# Patient Record
Sex: FEMALE | Race: BLACK OR AFRICAN AMERICAN | Employment: OTHER | URBAN - METROPOLITAN AREA
[De-identification: names, ages, dates, MRNs, and addresses within clinical notes are randomized per-mention and may not be internally consistent; named-entity substitution may affect disease eponyms.]

---

## 2017-03-17 RX ORDER — LORATADINE 10 MG/1
TABLET ORAL
Qty: 30 TABLET | Refills: 3 | Status: SHIPPED | OUTPATIENT
Start: 2017-03-17 | End: 2017-07-11

## 2017-05-05 ENCOUNTER — LAB ENCOUNTER (OUTPATIENT)
Dept: LAB | Age: 67
End: 2017-05-05
Attending: INTERNAL MEDICINE
Payer: MEDICARE

## 2017-05-05 DIAGNOSIS — E78.2 MIXED HYPERLIPIDEMIA: ICD-10-CM

## 2017-05-05 DIAGNOSIS — E11.9 DIABETES MELLITUS (HCC): Primary | ICD-10-CM

## 2017-05-05 PROCEDURE — 82570 ASSAY OF URINE CREATININE: CPT

## 2017-05-05 PROCEDURE — 36415 COLL VENOUS BLD VENIPUNCTURE: CPT

## 2017-05-05 PROCEDURE — 80053 COMPREHEN METABOLIC PANEL: CPT

## 2017-05-05 PROCEDURE — 82043 UR ALBUMIN QUANTITATIVE: CPT

## 2017-05-05 PROCEDURE — 83036 HEMOGLOBIN GLYCOSYLATED A1C: CPT

## 2017-05-05 PROCEDURE — 80061 LIPID PANEL: CPT

## 2017-07-10 NOTE — TELEPHONE ENCOUNTER
Pharm tried faxing request for Loratadine states has failed attempts. Calling in request. Please advise.  Thank you       Current Outpatient Prescriptions:  LORATADINE 10 MG Oral Tab TAKE 1 TABLET(10 MG) BY MOUTH EVERY DAY Disp: 30 tablet Rfl: 3

## 2017-07-11 RX ORDER — LORATADINE 10 MG/1
TABLET ORAL
Qty: 30 TABLET | Refills: 3 | Status: SHIPPED | OUTPATIENT
Start: 2017-07-11 | End: 2017-11-07

## 2017-09-08 ENCOUNTER — LAB ENCOUNTER (OUTPATIENT)
Dept: LAB | Age: 67
End: 2017-09-08
Attending: INTERNAL MEDICINE
Payer: MEDICARE

## 2017-09-08 DIAGNOSIS — E11.9 DIABETES MELLITUS (HCC): Primary | ICD-10-CM

## 2017-09-08 DIAGNOSIS — E78.2 MIXED HYPERLIPIDEMIA: ICD-10-CM

## 2017-09-08 LAB
ALBUMIN SERPL BCP-MCNC: 3.7 G/DL (ref 3.5–4.8)
ALBUMIN/GLOB SERPL: 1.2 {RATIO} (ref 1–2)
ALP SERPL-CCNC: 54 U/L (ref 32–100)
ALT SERPL-CCNC: 18 U/L (ref 14–54)
ANION GAP SERPL CALC-SCNC: 6 MMOL/L (ref 0–18)
AST SERPL-CCNC: 21 U/L (ref 15–41)
BILIRUB SERPL-MCNC: 0.5 MG/DL (ref 0.3–1.2)
BUN SERPL-MCNC: 19 MG/DL (ref 8–20)
BUN/CREAT SERPL: 17.9 (ref 10–20)
CALCIUM SERPL-MCNC: 9.1 MG/DL (ref 8.5–10.5)
CHLORIDE SERPL-SCNC: 106 MMOL/L (ref 95–110)
CHOLEST SERPL-MCNC: 141 MG/DL (ref 110–200)
CO2 SERPL-SCNC: 25 MMOL/L (ref 22–32)
CREAT SERPL-MCNC: 1.06 MG/DL (ref 0.5–1.5)
GLOBULIN PLAS-MCNC: 3.1 G/DL (ref 2.5–3.7)
GLUCOSE SERPL-MCNC: 86 MG/DL (ref 70–99)
HBA1C MFR BLD: 6.5 % (ref 4–6)
HDLC SERPL-MCNC: 51 MG/DL
LDLC SERPL CALC-MCNC: 78 MG/DL (ref 0–99)
NONHDLC SERPL-MCNC: 90 MG/DL
OSMOLALITY UR CALC.SUM OF ELEC: 286 MOSM/KG (ref 275–295)
POTASSIUM SERPL-SCNC: 3.4 MMOL/L (ref 3.3–5.1)
PROT SERPL-MCNC: 6.8 G/DL (ref 5.9–8.4)
SODIUM SERPL-SCNC: 137 MMOL/L (ref 136–144)
TRIGL SERPL-MCNC: 59 MG/DL (ref 1–149)

## 2017-09-08 PROCEDURE — 80061 LIPID PANEL: CPT

## 2017-09-08 PROCEDURE — 83036 HEMOGLOBIN GLYCOSYLATED A1C: CPT

## 2017-09-08 PROCEDURE — 80053 COMPREHEN METABOLIC PANEL: CPT

## 2017-09-08 PROCEDURE — 36415 COLL VENOUS BLD VENIPUNCTURE: CPT

## 2017-11-07 RX ORDER — LORATADINE 10 MG/1
TABLET ORAL
Qty: 30 TABLET | Refills: 3 | Status: SHIPPED | OUTPATIENT
Start: 2017-11-07 | End: 2018-03-09

## 2017-11-10 RX ORDER — LORATADINE 10 MG/1
TABLET ORAL
Qty: 30 TABLET | Refills: 0 | Status: SHIPPED | OUTPATIENT
Start: 2017-11-10 | End: 2019-08-27

## 2018-03-09 ENCOUNTER — LAB ENCOUNTER (OUTPATIENT)
Dept: LAB | Age: 68
End: 2018-03-09
Attending: INTERNAL MEDICINE
Payer: MEDICARE

## 2018-03-09 DIAGNOSIS — E11.9 DIABETES MELLITUS (HCC): Primary | ICD-10-CM

## 2018-03-09 DIAGNOSIS — E78.2 MIXED HYPERLIPIDEMIA: ICD-10-CM

## 2018-03-09 LAB
ALBUMIN SERPL BCP-MCNC: 3.8 G/DL (ref 3.5–4.8)
ALBUMIN/GLOB SERPL: 1.3 {RATIO} (ref 1–2)
ALP SERPL-CCNC: 58 U/L (ref 32–100)
ALT SERPL-CCNC: 15 U/L (ref 14–54)
ANION GAP SERPL CALC-SCNC: 6 MMOL/L (ref 0–18)
AST SERPL-CCNC: 19 U/L (ref 15–41)
BILIRUB SERPL-MCNC: 0.4 MG/DL (ref 0.3–1.2)
BUN SERPL-MCNC: 22 MG/DL (ref 8–20)
BUN/CREAT SERPL: 28.2 (ref 10–20)
CALCIUM SERPL-MCNC: 9.4 MG/DL (ref 8.5–10.5)
CHLORIDE SERPL-SCNC: 109 MMOL/L (ref 95–110)
CHOLEST SERPL-MCNC: 142 MG/DL (ref 110–200)
CO2 SERPL-SCNC: 26 MMOL/L (ref 22–32)
CREAT SERPL-MCNC: 0.78 MG/DL (ref 0.5–1.5)
CREAT UR-MCNC: 104.8 MG/DL
GLOBULIN PLAS-MCNC: 2.9 G/DL (ref 2.5–3.7)
GLUCOSE SERPL-MCNC: 95 MG/DL (ref 70–99)
HBA1C MFR BLD: 7.8 % (ref 4–6)
HDLC SERPL-MCNC: 50 MG/DL
LDLC SERPL CALC-MCNC: 79 MG/DL (ref 0–99)
MICROALBUMIN UR-MCNC: 1.2 MG/DL (ref 0–1.8)
MICROALBUMIN/CREAT UR: 11.5 MG/G{CREAT} (ref 0–20)
NONHDLC SERPL-MCNC: 92 MG/DL
OSMOLALITY UR CALC.SUM OF ELEC: 295 MOSM/KG (ref 275–295)
PATIENT FASTING: YES
POTASSIUM SERPL-SCNC: 4 MMOL/L (ref 3.3–5.1)
PROT SERPL-MCNC: 6.7 G/DL (ref 5.9–8.4)
SODIUM SERPL-SCNC: 141 MMOL/L (ref 136–144)
TRIGL SERPL-MCNC: 65 MG/DL (ref 1–149)

## 2018-03-09 PROCEDURE — 36415 COLL VENOUS BLD VENIPUNCTURE: CPT

## 2018-03-09 PROCEDURE — 82043 UR ALBUMIN QUANTITATIVE: CPT

## 2018-03-09 PROCEDURE — 82570 ASSAY OF URINE CREATININE: CPT

## 2018-03-09 PROCEDURE — 80061 LIPID PANEL: CPT

## 2018-03-09 PROCEDURE — 80053 COMPREHEN METABOLIC PANEL: CPT

## 2018-03-09 PROCEDURE — 83036 HEMOGLOBIN GLYCOSYLATED A1C: CPT

## 2018-03-14 RX ORDER — LORATADINE 10 MG/1
TABLET ORAL
Qty: 30 TABLET | Refills: 3 | Status: SHIPPED | OUTPATIENT
Start: 2018-03-14 | End: 2018-07-02

## 2018-07-05 RX ORDER — LORATADINE 10 MG/1
TABLET ORAL
Qty: 30 TABLET | Refills: 0 | Status: SHIPPED | OUTPATIENT
Start: 2018-07-05 | End: 2018-08-16

## 2018-08-07 NOTE — TELEPHONE ENCOUNTER
Pt last office visit on 11/15/16 for ringing in ear bilateral and acute sinusitis. Pt requesting a refill.  Please advise

## 2018-08-08 RX ORDER — LORATADINE 10 MG/1
TABLET ORAL
Qty: 30 TABLET | Refills: 3 | OUTPATIENT
Start: 2018-08-08

## 2018-08-16 ENCOUNTER — OFFICE VISIT (OUTPATIENT)
Dept: AUDIOLOGY | Facility: CLINIC | Age: 68
End: 2018-08-16
Payer: MEDICARE

## 2018-08-16 ENCOUNTER — OFFICE VISIT (OUTPATIENT)
Dept: OTOLARYNGOLOGY | Facility: CLINIC | Age: 68
End: 2018-08-16
Payer: MEDICARE

## 2018-08-16 VITALS
TEMPERATURE: 98 F | BODY MASS INDEX: 28.61 KG/M2 | DIASTOLIC BLOOD PRESSURE: 73 MMHG | SYSTOLIC BLOOD PRESSURE: 132 MMHG | HEIGHT: 66 IN | WEIGHT: 178 LBS

## 2018-08-16 DIAGNOSIS — J01.90 ACUTE SINUSITIS, RECURRENCE NOT SPECIFIED, UNSPECIFIED LOCATION: ICD-10-CM

## 2018-08-16 DIAGNOSIS — H93.13 TINNITUS OF BOTH EARS: Primary | ICD-10-CM

## 2018-08-16 DIAGNOSIS — H93.19 TINNITUS, UNSPECIFIED LATERALITY: Primary | ICD-10-CM

## 2018-08-16 PROCEDURE — 92567 TYMPANOMETRY: CPT | Performed by: AUDIOLOGIST

## 2018-08-16 PROCEDURE — 99214 OFFICE O/P EST MOD 30 MIN: CPT | Performed by: OTOLARYNGOLOGY

## 2018-08-16 PROCEDURE — G0463 HOSPITAL OUTPT CLINIC VISIT: HCPCS | Performed by: OTOLARYNGOLOGY

## 2018-08-16 PROCEDURE — 92557 COMPREHENSIVE HEARING TEST: CPT | Performed by: AUDIOLOGIST

## 2018-08-16 RX ORDER — LORATADINE 10 MG/1
10 TABLET ORAL DAILY
Qty: 90 TABLET | Refills: 3 | Status: SHIPPED | OUTPATIENT
Start: 2018-08-16 | End: 2020-08-25

## 2018-08-16 NOTE — PROGRESS NOTES
AUDIOGRAM     Swapna Chawla was referred for testing by Demarcus Mi for bilateral tinnitus. 9/22/1950  [id number]        Otoscopic inspection: right ear no cerumen; left ear no cerumen.        Tests/Procedures  Patient was tested via  standard inser

## 2018-08-16 NOTE — PROGRESS NOTES
Fabby Schmidt is a 69year old female. Patient presents with:   Follow - Up: regarding ringing in the ears and sinusitis, improvement in symptoms       HISTORY OF PRESENT ILLNESS    I have seen her in the past for halitosis which appears to be secondary History:  No date: KNEE REPLACEMENT SURGERY Left      REVIEW OF SYSTEMS    System Neg/Pos Details   Constitutional Negative Fatigue, fever and weight loss. ENMT Negative Drooling. Eyes Negative Blurred vision and vision changes.    Respiratory Negative Septum -Normal  Turbinates - Right: Normal, Left: Normal.       Current Outpatient Prescriptions:   •  loratadine 10 MG Oral Tab, Take 1 tablet (10 mg total) by mouth daily. , Disp: 90 tablet, Rfl: 3  •  LORATADINE 10 MG Oral Tab, TAKE 1 TABLET BY MOUTH ZAINAB

## 2018-09-12 ENCOUNTER — LAB ENCOUNTER (OUTPATIENT)
Dept: LAB | Age: 68
End: 2018-09-12
Attending: INTERNAL MEDICINE
Payer: MEDICARE

## 2018-09-12 DIAGNOSIS — E11.9 DIABETES MELLITUS (HCC): Primary | ICD-10-CM

## 2018-09-12 DIAGNOSIS — E78.2 MIXED HYPERLIPIDEMIA: ICD-10-CM

## 2018-09-12 LAB
ALBUMIN SERPL BCP-MCNC: 3.6 G/DL (ref 3.5–4.8)
ALBUMIN/GLOB SERPL: 1.1 {RATIO} (ref 1–2)
ALP SERPL-CCNC: 57 U/L (ref 32–100)
ALT SERPL-CCNC: 17 U/L (ref 14–54)
ANION GAP SERPL CALC-SCNC: 5 MMOL/L (ref 0–18)
AST SERPL-CCNC: 18 U/L (ref 15–41)
BILIRUB SERPL-MCNC: 0.4 MG/DL (ref 0.3–1.2)
BUN SERPL-MCNC: 21 MG/DL (ref 8–20)
BUN/CREAT SERPL: 21 (ref 10–20)
CALCIUM SERPL-MCNC: 9.3 MG/DL (ref 8.5–10.5)
CHLORIDE SERPL-SCNC: 111 MMOL/L (ref 95–110)
CHOLEST SERPL-MCNC: 136 MG/DL (ref 110–200)
CO2 SERPL-SCNC: 24 MMOL/L (ref 22–32)
CREAT SERPL-MCNC: 1 MG/DL (ref 0.5–1.5)
GLOBULIN PLAS-MCNC: 3.4 G/DL (ref 2.5–3.7)
GLUCOSE SERPL-MCNC: 59 MG/DL (ref 70–99)
HBA1C MFR BLD: 6.4 % (ref 4–6)
HDLC SERPL-MCNC: 47 MG/DL
LDLC SERPL CALC-MCNC: 78 MG/DL (ref 0–99)
NONHDLC SERPL-MCNC: 89 MG/DL
OSMOLALITY UR CALC.SUM OF ELEC: 291 MOSM/KG (ref 275–295)
PATIENT FASTING: YES
POTASSIUM SERPL-SCNC: 3.8 MMOL/L (ref 3.3–5.1)
PROT SERPL-MCNC: 7 G/DL (ref 5.9–8.4)
SODIUM SERPL-SCNC: 140 MMOL/L (ref 136–144)
TRIGL SERPL-MCNC: 54 MG/DL (ref 1–149)

## 2018-09-12 PROCEDURE — 80061 LIPID PANEL: CPT

## 2018-09-12 PROCEDURE — 83036 HEMOGLOBIN GLYCOSYLATED A1C: CPT

## 2018-09-12 PROCEDURE — 80053 COMPREHEN METABOLIC PANEL: CPT

## 2018-09-12 PROCEDURE — 36415 COLL VENOUS BLD VENIPUNCTURE: CPT

## 2019-03-12 ENCOUNTER — LAB ENCOUNTER (OUTPATIENT)
Dept: LAB | Age: 69
End: 2019-03-12
Attending: INTERNAL MEDICINE
Payer: MEDICARE

## 2019-03-12 DIAGNOSIS — E78.5 HYPERLIPIDEMIA: ICD-10-CM

## 2019-03-12 DIAGNOSIS — E11.9 DIABETES MELLITUS, TYPE II (HCC): Primary | ICD-10-CM

## 2019-03-12 LAB
ALBUMIN SERPL-MCNC: 3.8 G/DL (ref 3.4–5)
ALBUMIN/GLOB SERPL: 1 {RATIO} (ref 1–2)
ALP LIVER SERPL-CCNC: 99 U/L (ref 55–142)
ALT SERPL-CCNC: 17 U/L (ref 13–56)
ANION GAP SERPL CALC-SCNC: 6 MMOL/L (ref 0–18)
AST SERPL-CCNC: 14 U/L (ref 15–37)
BILIRUB SERPL-MCNC: 0.4 MG/DL (ref 0.1–2)
BUN BLD-MCNC: 23 MG/DL (ref 7–18)
BUN/CREAT SERPL: 24.5 (ref 10–20)
CALCIUM BLD-MCNC: 9.2 MG/DL (ref 8.5–10.1)
CHLORIDE SERPL-SCNC: 112 MMOL/L (ref 98–107)
CHOLEST SMN-MCNC: 149 MG/DL (ref ?–200)
CO2 SERPL-SCNC: 26 MMOL/L (ref 21–32)
CREAT BLD-MCNC: 0.94 MG/DL (ref 0.55–1.02)
CREAT UR-SCNC: 139 MG/DL
EST. AVERAGE GLUCOSE BLD GHB EST-MCNC: 151 MG/DL (ref 68–126)
GLOBULIN PLAS-MCNC: 3.9 G/DL (ref 2.8–4.4)
GLUCOSE BLD-MCNC: 67 MG/DL (ref 70–99)
HBA1C MFR BLD HPLC: 6.9 % (ref ?–5.7)
HDLC SERPL-MCNC: 65 MG/DL (ref 40–59)
LDLC SERPL CALC-MCNC: 71 MG/DL (ref ?–100)
M PROTEIN MFR SERPL ELPH: 7.7 G/DL (ref 6.4–8.2)
MICROALBUMIN UR-MCNC: 1.87 MG/DL
MICROALBUMIN/CREAT 24H UR-RTO: 13.5 UG/MG (ref ?–30)
NONHDLC SERPL-MCNC: 84 MG/DL (ref ?–130)
OSMOLALITY SERPL CALC.SUM OF ELEC: 300 MOSM/KG (ref 275–295)
POTASSIUM SERPL-SCNC: 4.2 MMOL/L (ref 3.5–5.1)
SODIUM SERPL-SCNC: 144 MMOL/L (ref 136–145)
TRIGL SERPL-MCNC: 67 MG/DL (ref 30–149)
VLDLC SERPL CALC-MCNC: 13 MG/DL (ref 0–30)

## 2019-03-12 PROCEDURE — 36415 COLL VENOUS BLD VENIPUNCTURE: CPT

## 2019-03-12 PROCEDURE — 82043 UR ALBUMIN QUANTITATIVE: CPT

## 2019-03-12 PROCEDURE — 83036 HEMOGLOBIN GLYCOSYLATED A1C: CPT

## 2019-03-12 PROCEDURE — 80053 COMPREHEN METABOLIC PANEL: CPT

## 2019-03-12 PROCEDURE — 82570 ASSAY OF URINE CREATININE: CPT

## 2019-03-12 PROCEDURE — 80061 LIPID PANEL: CPT

## 2019-08-19 RX ORDER — LORATADINE 10 MG/1
TABLET ORAL
Qty: 90 TABLET | Refills: 0 | OUTPATIENT
Start: 2019-08-19

## 2019-08-27 ENCOUNTER — OFFICE VISIT (OUTPATIENT)
Dept: OTOLARYNGOLOGY | Facility: CLINIC | Age: 69
End: 2019-08-27
Payer: MEDICARE

## 2019-08-27 VITALS
DIASTOLIC BLOOD PRESSURE: 67 MMHG | BODY MASS INDEX: 29 KG/M2 | SYSTOLIC BLOOD PRESSURE: 106 MMHG | HEIGHT: 66 IN | TEMPERATURE: 99 F

## 2019-08-27 DIAGNOSIS — J01.90 ACUTE SINUSITIS, RECURRENCE NOT SPECIFIED, UNSPECIFIED LOCATION: Primary | ICD-10-CM

## 2019-08-27 PROCEDURE — 99214 OFFICE O/P EST MOD 30 MIN: CPT | Performed by: OTOLARYNGOLOGY

## 2019-08-27 PROCEDURE — G0463 HOSPITAL OUTPT CLINIC VISIT: HCPCS | Performed by: OTOLARYNGOLOGY

## 2019-08-27 RX ORDER — FLUTICASONE PROPIONATE 50 MCG
1 SPRAY, SUSPENSION (ML) NASAL 2 TIMES DAILY
Qty: 1 BOTTLE | Refills: 3 | Status: SHIPPED | OUTPATIENT
Start: 2019-08-27 | End: 2020-08-25

## 2019-08-27 RX ORDER — AMOXICILLIN AND CLAVULANATE POTASSIUM 875; 125 MG/1; MG/1
1 TABLET, FILM COATED ORAL EVERY 12 HOURS
Qty: 20 TABLET | Refills: 0 | Status: SHIPPED | OUTPATIENT
Start: 2019-08-27 | End: 2020-08-25

## 2019-08-27 RX ORDER — MONTELUKAST SODIUM 10 MG/1
10 TABLET ORAL NIGHTLY
Qty: 30 TABLET | Refills: 3 | Status: SHIPPED | OUTPATIENT
Start: 2019-08-27 | End: 2020-08-25

## 2019-08-27 RX ORDER — LORATADINE 10 MG/1
10 TABLET ORAL DAILY
Qty: 30 TABLET | Refills: 3 | Status: SHIPPED | OUTPATIENT
Start: 2019-08-27 | End: 2020-01-04

## 2019-08-27 NOTE — PROGRESS NOTES
Misha Mansfield is a 77year old female.   Patient presents with:  Throat Problem: pt noticed a foul odor in throat and mouth for about 2 weeks       HISTORY OF PRESENT ILLNESS    I have seen her in the past for halitosis which appears to be secondary to po coffee      Family History   Problem Relation Age of Onset   • Breast Cancer Mother        Past Medical History:   Diagnosis Date   • Arthritis    • Chronic sinusitis 2013    halitosis   • Diabetes ([facility] [address]    • Hyperlipidemia        Past Surgical History: Submandibular. Anterior cervical. Posterior cervical. Supraclavicular.         Nose/Mouth/Throat Normal External nose - Normal. Lips/teeth/gums - Normal. Tonsils - Normal. Oropharynx - Normal.   Nose/Mouth/Throat Normal Nares - Right: Normal Left: Normal. S specified, unspecified location  I suspect another mild sinus infection. Start Augmentin from 10 days Singulair Claritin and fluticasone and return to see me in 1 year for reevaluation. Colby Smith.  Blossom Olmstead MD    8/27/2019    12:18 PM

## 2019-08-30 ENCOUNTER — TELEPHONE (OUTPATIENT)
Dept: OTOLARYNGOLOGY | Facility: CLINIC | Age: 69
End: 2019-08-30

## 2019-08-30 RX ORDER — AZITHROMYCIN 250 MG/1
TABLET, FILM COATED ORAL
Qty: 1 PACKAGE | Refills: 0 | Status: SHIPPED | OUTPATIENT
Start: 2019-08-30 | End: 2020-08-25

## 2019-08-30 NOTE — TELEPHONE ENCOUNTER
Pt states she is experiencing constipation after taking antibiotic medication and is requesting to speak with RN to advise.

## 2019-08-30 NOTE — TELEPHONE ENCOUNTER
, pt was seen in office on 08/27/19 for acute sinusitis. Pt using Flonase, Claritin, pt is not using montelukast and Augmentin. Per pt is states since antibiotic, pt started with constipation.  Per pt states stopped antibiotic this morning and and

## 2019-09-17 ENCOUNTER — LAB ENCOUNTER (OUTPATIENT)
Dept: LAB | Age: 69
End: 2019-09-17
Attending: INTERNAL MEDICINE
Payer: MEDICARE

## 2019-09-17 DIAGNOSIS — E11.9 DIABETES MELLITUS, TYPE II (HCC): Primary | ICD-10-CM

## 2019-09-17 DIAGNOSIS — E78.2 MIXED HYPERLIPIDEMIA: ICD-10-CM

## 2019-09-17 LAB
ALBUMIN SERPL-MCNC: 3.5 G/DL (ref 3.4–5)
ALBUMIN/GLOB SERPL: 0.9 {RATIO} (ref 1–2)
ALP LIVER SERPL-CCNC: 80 U/L (ref 55–142)
ALT SERPL-CCNC: 21 U/L (ref 13–56)
ANION GAP SERPL CALC-SCNC: 7 MMOL/L (ref 0–18)
AST SERPL-CCNC: 15 U/L (ref 15–37)
BILIRUB SERPL-MCNC: 0.3 MG/DL (ref 0.1–2)
BUN BLD-MCNC: 22 MG/DL (ref 7–18)
BUN/CREAT SERPL: 21 (ref 10–20)
CALCIUM BLD-MCNC: 9.1 MG/DL (ref 8.5–10.1)
CHLORIDE SERPL-SCNC: 111 MMOL/L (ref 98–112)
CHOLEST SMN-MCNC: 141 MG/DL (ref ?–200)
CO2 SERPL-SCNC: 25 MMOL/L (ref 21–32)
CREAT BLD-MCNC: 1.05 MG/DL (ref 0.55–1.02)
EST. AVERAGE GLUCOSE BLD GHB EST-MCNC: 229 MG/DL (ref 68–126)
GLOBULIN PLAS-MCNC: 3.7 G/DL (ref 2.8–4.4)
GLUCOSE BLD-MCNC: 164 MG/DL (ref 70–99)
HBA1C MFR BLD HPLC: 9.6 % (ref ?–5.7)
HDLC SERPL-MCNC: 48 MG/DL (ref 40–59)
LDLC SERPL CALC-MCNC: 73 MG/DL (ref ?–100)
M PROTEIN MFR SERPL ELPH: 7.2 G/DL (ref 6.4–8.2)
NONHDLC SERPL-MCNC: 93 MG/DL (ref ?–130)
OSMOLALITY SERPL CALC.SUM OF ELEC: 303 MOSM/KG (ref 275–295)
PATIENT FASTING: YES
PATIENT FASTING: YES
POTASSIUM SERPL-SCNC: 4.4 MMOL/L (ref 3.5–5.1)
SODIUM SERPL-SCNC: 143 MMOL/L (ref 136–145)
TRIGL SERPL-MCNC: 98 MG/DL (ref 30–149)
VLDLC SERPL CALC-MCNC: 20 MG/DL (ref 0–30)

## 2019-09-17 PROCEDURE — 36415 COLL VENOUS BLD VENIPUNCTURE: CPT

## 2019-09-17 PROCEDURE — 80061 LIPID PANEL: CPT

## 2019-09-17 PROCEDURE — 80053 COMPREHEN METABOLIC PANEL: CPT

## 2019-09-17 PROCEDURE — 83036 HEMOGLOBIN GLYCOSYLATED A1C: CPT

## 2020-01-04 RX ORDER — LORATADINE 10 MG/1
TABLET ORAL
Qty: 30 TABLET | Refills: 3 | Status: SHIPPED | OUTPATIENT
Start: 2020-01-04 | End: 2020-05-07

## 2020-05-07 RX ORDER — LORATADINE 10 MG/1
TABLET ORAL
Qty: 30 TABLET | Refills: 3 | Status: SHIPPED | OUTPATIENT
Start: 2020-05-07 | End: 2020-08-25

## 2020-06-08 ENCOUNTER — TELEPHONE (OUTPATIENT)
Dept: ADMINISTRATIVE | Facility: HOSPITAL | Age: 70
End: 2020-06-08

## 2020-06-08 NOTE — TELEPHONE ENCOUNTER
Patient states that she spoke to her PCP about getting tested for COVID. She states that she felt a little poorly for a few days from about 6/03/20-6/05/20. All vague fatigue with all Neg ROS, otherwise. Type 2 DM, does not check BS.   Has not seen PCP i

## 2020-06-10 ENCOUNTER — HOSPITAL ENCOUNTER (EMERGENCY)
Facility: HOSPITAL | Age: 70
Discharge: HOME | End: 2020-06-10
Attending: EMERGENCY MEDICINE
Payer: MEDICARE

## 2020-06-10 ENCOUNTER — APPOINTMENT (OUTPATIENT)
Dept: CT IMAGING | Facility: HOSPITAL | Age: 70
End: 2020-06-10
Attending: EMERGENCY MEDICINE
Payer: MEDICARE

## 2020-06-10 ENCOUNTER — APPOINTMENT (OUTPATIENT)
Dept: GENERAL RADIOLOGY | Facility: HOSPITAL | Age: 70
End: 2020-06-10
Attending: EMERGENCY MEDICINE
Payer: MEDICARE

## 2020-06-10 VITALS
DIASTOLIC BLOOD PRESSURE: 82 MMHG | TEMPERATURE: 97 F | RESPIRATION RATE: 18 BRPM | OXYGEN SATURATION: 99 % | SYSTOLIC BLOOD PRESSURE: 152 MMHG | HEART RATE: 74 BPM

## 2020-06-10 DIAGNOSIS — R42 VERTIGO: Primary | ICD-10-CM

## 2020-06-10 PROCEDURE — 80048 BASIC METABOLIC PNL TOTAL CA: CPT | Performed by: EMERGENCY MEDICINE

## 2020-06-10 PROCEDURE — 81001 URINALYSIS AUTO W/SCOPE: CPT | Performed by: EMERGENCY MEDICINE

## 2020-06-10 PROCEDURE — 84484 ASSAY OF TROPONIN QUANT: CPT | Performed by: EMERGENCY MEDICINE

## 2020-06-10 PROCEDURE — 99284 EMERGENCY DEPT VISIT MOD MDM: CPT

## 2020-06-10 PROCEDURE — 70450 CT HEAD/BRAIN W/O DYE: CPT | Performed by: EMERGENCY MEDICINE

## 2020-06-10 PROCEDURE — 82962 GLUCOSE BLOOD TEST: CPT

## 2020-06-10 PROCEDURE — 85025 COMPLETE CBC W/AUTO DIFF WBC: CPT | Performed by: EMERGENCY MEDICINE

## 2020-06-10 PROCEDURE — 71045 X-RAY EXAM CHEST 1 VIEW: CPT | Performed by: EMERGENCY MEDICINE

## 2020-06-10 PROCEDURE — 93005 ELECTROCARDIOGRAM TRACING: CPT

## 2020-06-10 PROCEDURE — 36415 COLL VENOUS BLD VENIPUNCTURE: CPT

## 2020-06-10 PROCEDURE — 82009 KETONE BODYS QUAL: CPT | Performed by: EMERGENCY MEDICINE

## 2020-06-10 PROCEDURE — 93010 ELECTROCARDIOGRAM REPORT: CPT | Performed by: EMERGENCY MEDICINE

## 2020-06-10 RX ORDER — MECLIZINE HYDROCHLORIDE 25 MG/1
25 TABLET ORAL 3 TIMES DAILY PRN
Qty: 20 TABLET | Refills: 0 | Status: ON HOLD | OUTPATIENT
Start: 2020-06-10 | End: 2021-08-17

## 2020-06-10 NOTE — ED NOTES
PT reports even with anxiolytic medications she will not be able to tolerate MRI.  Dr. Parada Learn aware

## 2020-06-10 NOTE — ED PROVIDER NOTES
Patient Seen in: [facility] AND [facility] Emergency Department      History   Patient presents with:  Dizziness    Stated Complaint: dizziness, \"high sugars\" suspected per pt     HPI    Patient presents the emergency department complaining of dizziness spells 99%         Physical Exam  Vitals signs and nursing note reviewed. Constitutional:       General: She is not in acute distress. Appearance: She is well-developed. HENT:      Head: Normocephalic.       Mouth/Throat:      Mouth: Mucous membranes are m components within normal limits   POCT GLUCOSE - Abnormal; Notable for the following components:    POC Glucose  257 (*)     All other components within normal limits   CBC W/ DIFFERENTIAL - Abnormal; Notable for the following components:    RBC 3.65 (*) performed to rule out the possibility of a small stroke causing her disequilibrium although with the short-lived 1 minute episodes that occur episodically, this is more likely peripheral vertigo symptomatology but I did explain to the patient that there wa

## 2020-06-10 NOTE — ED INITIAL ASSESSMENT (HPI)
Intermittent dizziness since last week. Pt reports compliance with oral DM meds but has not been checking her sugars. Denies cough/illness.

## 2020-08-11 ENCOUNTER — OFFICE VISIT (OUTPATIENT)
Dept: OTOLARYNGOLOGY | Facility: CLINIC | Age: 70
End: 2020-08-11
Payer: MEDICARE

## 2020-08-11 VITALS
DIASTOLIC BLOOD PRESSURE: 70 MMHG | BODY MASS INDEX: 29 KG/M2 | HEIGHT: 66 IN | SYSTOLIC BLOOD PRESSURE: 116 MMHG | HEART RATE: 77 BPM

## 2020-08-11 DIAGNOSIS — J30.9 ALLERGIC RHINITIS, UNSPECIFIED SEASONALITY, UNSPECIFIED TRIGGER: Primary | ICD-10-CM

## 2020-08-11 DIAGNOSIS — H92.11 OTORRHEA OF RIGHT EAR: ICD-10-CM

## 2020-08-11 PROCEDURE — 92504 EAR MICROSCOPY EXAMINATION: CPT | Performed by: OTOLARYNGOLOGY

## 2020-08-11 PROCEDURE — 99213 OFFICE O/P EST LOW 20 MIN: CPT | Performed by: OTOLARYNGOLOGY

## 2020-08-11 PROCEDURE — G0463 HOSPITAL OUTPT CLINIC VISIT: HCPCS | Performed by: OTOLARYNGOLOGY

## 2020-08-11 RX ORDER — LORATADINE 10 MG/1
10 TABLET ORAL DAILY
Qty: 90 TABLET | Refills: 3 | Status: SHIPPED | OUTPATIENT
Start: 2020-08-11 | End: 2020-08-25

## 2020-08-11 RX ORDER — SITAGLIPTIN 100 MG/1
100 TABLET, FILM COATED ORAL DAILY
COMMUNITY
Start: 2020-07-30

## 2020-08-11 NOTE — PROGRESS NOTES
Merced Rizzo is a 68year old female.   Patient presents with:  Ear Problem: per pt continous drainage from right ear, clear drainage no odor, per pt has been has been cortipsorin ear drops for about 3 weeks , no improvment , pt also c/o itching       HI of education: Not on file      Highest education level: Not on file    Tobacco Use      Smoking status: Current Every Day Smoker        Years: 10.00      Smokeless tobacco: Never Used    Substance and Sexual Activity      Alcohol use: No        Alcohol/wee Normal. Skull - Normal.        Nasopharynx Normal External nose - Normal. Lips/teeth/gums - Normal. Tonsils - Normal. Oropharynx - Normal.   Ears Normal Inspection - Right: Normal, Left: Normal. Canal - Right: debris in canal and on drum.   Left: Normal. TM (Patient not taking: Reported on 8/11/2020 ), Disp: 20 tablet, Rfl: 0  •  azithromycin (ZITHROMAX Z-CHRIS) 250 MG Oral Tab, Take 1 by oral route every day for 5 days. 2 tablets today.  (Patient not taking: Reported on 8/11/2020 ), Disp: 1 Package, Rfl: 0  •

## 2020-08-25 ENCOUNTER — OFFICE VISIT (OUTPATIENT)
Dept: ORTHOPEDICS CLINIC | Facility: CLINIC | Age: 70
End: 2020-08-25
Payer: MEDICARE

## 2020-08-25 ENCOUNTER — HOSPITAL ENCOUNTER (OUTPATIENT)
Dept: GENERAL RADIOLOGY | Facility: HOSPITAL | Age: 70
Discharge: HOME | End: 2020-08-25
Attending: ORTHOPAEDIC SURGERY
Payer: MEDICARE

## 2020-08-25 VITALS
HEIGHT: 66 IN | BODY MASS INDEX: 29 KG/M2 | RESPIRATION RATE: 16 BRPM | DIASTOLIC BLOOD PRESSURE: 76 MMHG | SYSTOLIC BLOOD PRESSURE: 134 MMHG

## 2020-08-25 DIAGNOSIS — M25.562 PAIN IN BOTH KNEES, UNSPECIFIED CHRONICITY: ICD-10-CM

## 2020-08-25 DIAGNOSIS — M25.562 PAIN IN BOTH KNEES, UNSPECIFIED CHRONICITY: Primary | ICD-10-CM

## 2020-08-25 DIAGNOSIS — M25.561 PAIN IN BOTH KNEES, UNSPECIFIED CHRONICITY: Primary | ICD-10-CM

## 2020-08-25 DIAGNOSIS — T84.84XA PAIN DUE TO TOTAL LEFT KNEE REPLACEMENT, INITIAL ENCOUNTER (HCC): ICD-10-CM

## 2020-08-25 DIAGNOSIS — Z96.652 PAIN DUE TO TOTAL LEFT KNEE REPLACEMENT, INITIAL ENCOUNTER (HCC): ICD-10-CM

## 2020-08-25 DIAGNOSIS — M17.11 PRIMARY OSTEOARTHRITIS OF RIGHT KNEE: ICD-10-CM

## 2020-08-25 DIAGNOSIS — M25.561 PAIN IN BOTH KNEES, UNSPECIFIED CHRONICITY: ICD-10-CM

## 2020-08-25 PROCEDURE — 73565 X-RAY EXAM OF KNEES: CPT | Performed by: ORTHOPAEDIC SURGERY

## 2020-08-25 PROCEDURE — 20610 DRAIN/INJ JOINT/BURSA W/O US: CPT | Performed by: ORTHOPAEDIC SURGERY

## 2020-08-25 PROCEDURE — 99204 OFFICE O/P NEW MOD 45 MIN: CPT | Performed by: ORTHOPAEDIC SURGERY

## 2020-08-25 RX ORDER — TRIAMCINOLONE ACETONIDE 40 MG/ML
40 INJECTION, SUSPENSION INTRA-ARTICULAR; INTRAMUSCULAR ONCE
Status: COMPLETED | OUTPATIENT
Start: 2020-08-25 | End: 2020-08-25

## 2020-08-25 RX ADMIN — TRIAMCINOLONE ACETONIDE 40 MG: 40 INJECTION, SUSPENSION INTRA-ARTICULAR; INTRAMUSCULAR at 12:05:00

## 2020-08-25 NOTE — H&P
NURSING INTAKE COMMENTS: Patient presents with:  Consult: bilateral knee pain -- Right knee pain is 5/10 and left knee is 5/10. Denies any recent injuries. Pain comes and goes especially at night. Hx of left TKA 2011/2012. No recent xrays taken.   Left lowe Allergies  Family History   Problem Relation Age of Onset   • Breast Cancer Mother      No family Hx of DVT/PE    Social History    Occupational History      Not on file    Tobacco Use      Smoking status: Current Every Day Smoker        Years: 10.00 symptoms DVT. Neurological: See normal throughout bilateral lower extremities. Imaging: Right knee has straightforward bone-on-bone medial osteoarthritis. The left knee replacement stems proximally distally.   The femoral component as well as patella a 8/25/2020 at 12:46 PM             Lab Results   Component Value Date    WBC 9.0 06/10/2020    HGB 10.9 (L) 06/10/2020    .0 06/10/2020      Lab Results   Component Value Date     (H) 06/10/2020    BUN 24 (H) 06/10/2020    CREATSERUM 1.11 (H) her being diabetic she will have to follow-up in 1 week. At follow-up we will do an aspiration to evaluate the joint fluid and possibly sent out for analysis to evaluate for infection. And possibly proceed forth a cortisone injection of the left knee.

## 2020-09-08 ENCOUNTER — OFFICE VISIT (OUTPATIENT)
Dept: ORTHOPEDICS CLINIC | Facility: CLINIC | Age: 70
End: 2020-09-08
Payer: MEDICARE

## 2020-09-08 DIAGNOSIS — T84.84XA PAIN DUE TO TOTAL LEFT KNEE REPLACEMENT, INITIAL ENCOUNTER (HCC): Primary | ICD-10-CM

## 2020-09-08 DIAGNOSIS — M17.11 PRIMARY OSTEOARTHRITIS OF RIGHT KNEE: ICD-10-CM

## 2020-09-08 DIAGNOSIS — Z96.652 PAIN DUE TO TOTAL LEFT KNEE REPLACEMENT, INITIAL ENCOUNTER (HCC): Primary | ICD-10-CM

## 2020-09-08 PROCEDURE — G0463 HOSPITAL OUTPT CLINIC VISIT: HCPCS | Performed by: ORTHOPAEDIC SURGERY

## 2020-09-08 PROCEDURE — 20610 DRAIN/INJ JOINT/BURSA W/O US: CPT | Performed by: ORTHOPAEDIC SURGERY

## 2020-09-08 PROCEDURE — 99213 OFFICE O/P EST LOW 20 MIN: CPT | Performed by: ORTHOPAEDIC SURGERY

## 2020-09-08 RX ORDER — TRIAMCINOLONE ACETONIDE 40 MG/ML
40 INJECTION, SUSPENSION INTRA-ARTICULAR; INTRAMUSCULAR ONCE
Status: COMPLETED | OUTPATIENT
Start: 2020-09-08 | End: 2020-09-08

## 2020-09-08 RX ADMIN — TRIAMCINOLONE ACETONIDE 40 MG: 40 INJECTION, SUSPENSION INTRA-ARTICULAR; INTRAMUSCULAR at 18:08:00

## 2020-09-08 NOTE — PROGRESS NOTES
NURSING INTAKE COMMENTS: Patient presents with: Follow - Up: left knee pain ,here for an injection      HPI: This 68year old female presents today injection to the left knee replacement.   Incidentally her right knee was injected at the last visit and is worrisome skin lesions  EYES:denies blurred vision or double vision  HEENT: denies new nasal congestion, sinus pain or ST  LUNGS: denies shortness of breath  CARDIOVASCULAR: denies chest pain  GI: no hematemesis, no worsening heartburn, no diarrhea  : no in the right knee including joint space narrowing and osteophyte formation most significant in the medial compartment. Status post revision left knee total arthroplasty with long stem femoral and tibial components.   Periprosthetic lucency is seen surround week to let us know the results the injection. Again the right knee was doing very well from the injection 2 weeks ago. Follow Up: Return if symptoms worsen or fail to improve.     Alcira Serna MD

## 2020-09-15 ENCOUNTER — LAB ENCOUNTER (OUTPATIENT)
Dept: LAB | Age: 70
End: 2020-09-15
Attending: INTERNAL MEDICINE
Payer: MEDICARE

## 2020-09-15 DIAGNOSIS — E78.5 HYPERLIPIDEMIA: ICD-10-CM

## 2020-09-15 DIAGNOSIS — E11.9 DM TYPE 2 (DIABETES MELLITUS, TYPE 2) (HCC): Primary | ICD-10-CM

## 2020-09-15 LAB
ALBUMIN SERPL-MCNC: 3.4 G/DL (ref 3.4–5)
ALBUMIN/GLOB SERPL: 0.9 {RATIO} (ref 1–2)
ALP LIVER SERPL-CCNC: 78 U/L (ref 55–142)
ALT SERPL-CCNC: 23 U/L (ref 13–56)
ANION GAP SERPL CALC-SCNC: 8 MMOL/L (ref 0–18)
AST SERPL-CCNC: 9 U/L (ref 15–37)
BILIRUB SERPL-MCNC: 0.5 MG/DL (ref 0.1–2)
BUN BLD-MCNC: 27 MG/DL (ref 7–18)
BUN/CREAT SERPL: 21.6 (ref 10–20)
CALCIUM BLD-MCNC: 9.5 MG/DL (ref 8.5–10.1)
CHLORIDE SERPL-SCNC: 108 MMOL/L (ref 98–112)
CHOLEST SMN-MCNC: 166 MG/DL (ref ?–200)
CO2 SERPL-SCNC: 24 MMOL/L (ref 21–32)
CREAT BLD-MCNC: 1.25 MG/DL (ref 0.55–1.02)
CREAT UR-SCNC: 194 MG/DL
EST. AVERAGE GLUCOSE BLD GHB EST-MCNC: 246 MG/DL (ref 68–126)
GLOBULIN PLAS-MCNC: 3.8 G/DL (ref 2.8–4.4)
GLUCOSE BLD-MCNC: 156 MG/DL (ref 70–99)
HBA1C MFR BLD HPLC: 10.2 % (ref ?–5.7)
HDLC SERPL-MCNC: 63 MG/DL (ref 40–59)
LDLC SERPL CALC-MCNC: 87 MG/DL (ref ?–100)
M PROTEIN MFR SERPL ELPH: 7.2 G/DL (ref 6.4–8.2)
MICROALBUMIN UR-MCNC: 4.87 MG/DL
MICROALBUMIN/CREAT 24H UR-RTO: 25.1 UG/MG (ref ?–30)
NONHDLC SERPL-MCNC: 103 MG/DL (ref ?–130)
OSMOLALITY SERPL CALC.SUM OF ELEC: 298 MOSM/KG (ref 275–295)
PATIENT FASTING Y/N/NP: YES
PATIENT FASTING Y/N/NP: YES
POTASSIUM SERPL-SCNC: 4.2 MMOL/L (ref 3.5–5.1)
SODIUM SERPL-SCNC: 140 MMOL/L (ref 136–145)
TRIGL SERPL-MCNC: 82 MG/DL (ref 30–149)
VLDLC SERPL CALC-MCNC: 16 MG/DL (ref 0–30)

## 2020-09-15 PROCEDURE — 36415 COLL VENOUS BLD VENIPUNCTURE: CPT

## 2020-09-15 PROCEDURE — 82570 ASSAY OF URINE CREATININE: CPT

## 2020-09-15 PROCEDURE — 80061 LIPID PANEL: CPT

## 2020-09-15 PROCEDURE — 82043 UR ALBUMIN QUANTITATIVE: CPT

## 2020-09-15 PROCEDURE — 80053 COMPREHEN METABOLIC PANEL: CPT

## 2020-09-15 PROCEDURE — 83036 HEMOGLOBIN GLYCOSYLATED A1C: CPT

## 2021-02-03 DIAGNOSIS — Z23 NEED FOR VACCINATION: ICD-10-CM

## 2021-05-25 ENCOUNTER — LAB ENCOUNTER (OUTPATIENT)
Dept: LAB | Age: 71
End: 2021-05-25
Attending: INTERNAL MEDICINE
Payer: MEDICARE

## 2021-05-25 DIAGNOSIS — E11.9 DIABETES MELLITUS (HCC): Primary | ICD-10-CM

## 2021-05-25 DIAGNOSIS — E78.2 MIXED HYPERLIPIDEMIA: ICD-10-CM

## 2021-05-25 PROCEDURE — 36415 COLL VENOUS BLD VENIPUNCTURE: CPT

## 2021-05-25 PROCEDURE — 82570 ASSAY OF URINE CREATININE: CPT

## 2021-05-25 PROCEDURE — 80061 LIPID PANEL: CPT

## 2021-05-25 PROCEDURE — 83036 HEMOGLOBIN GLYCOSYLATED A1C: CPT

## 2021-05-25 PROCEDURE — 80053 COMPREHEN METABOLIC PANEL: CPT

## 2021-05-25 PROCEDURE — 82043 UR ALBUMIN QUANTITATIVE: CPT

## 2021-08-16 ENCOUNTER — HOSPITAL ENCOUNTER (INPATIENT)
Facility: HOSPITAL | Age: 71
LOS: 1 days | Discharge: HOME | DRG: 390 | End: 2021-08-18
Attending: EMERGENCY MEDICINE | Admitting: HOSPITALIST
Payer: MEDICARE

## 2021-08-16 DIAGNOSIS — K56.609 SMALL BOWEL OBSTRUCTION (HCC): Primary | ICD-10-CM

## 2021-08-17 ENCOUNTER — APPOINTMENT (OUTPATIENT)
Dept: GENERAL RADIOLOGY | Facility: HOSPITAL | Age: 71
DRG: 390 | End: 2021-08-17
Attending: EMERGENCY MEDICINE
Payer: MEDICARE

## 2021-08-17 ENCOUNTER — APPOINTMENT (OUTPATIENT)
Dept: CT IMAGING | Facility: HOSPITAL | Age: 71
DRG: 390 | End: 2021-08-17
Attending: EMERGENCY MEDICINE
Payer: MEDICARE

## 2021-08-17 ENCOUNTER — APPOINTMENT (OUTPATIENT)
Dept: GENERAL RADIOLOGY | Facility: HOSPITAL | Age: 71
DRG: 390 | End: 2021-08-17
Attending: SURGERY
Payer: MEDICARE

## 2021-08-17 PROBLEM — K56.609 SMALL BOWEL OBSTRUCTION (HCC): Status: ACTIVE | Noted: 2021-08-17

## 2021-08-17 LAB
ANION GAP SERPL CALC-SCNC: 7 MMOL/L (ref 0–18)
BASOPHILS # BLD AUTO: 0.03 X10(3) UL (ref 0–0.2)
BASOPHILS NFR BLD AUTO: 0.2 %
BILIRUB UR QL: NEGATIVE
BUN BLD-MCNC: 17 MG/DL (ref 7–18)
BUN/CREAT SERPL: 17.2 (ref 10–20)
CALCIUM BLD-MCNC: 9.1 MG/DL (ref 8.5–10.1)
CHLORIDE SERPL-SCNC: 103 MMOL/L (ref 98–112)
CLARITY UR: CLEAR
CO2 SERPL-SCNC: 26 MMOL/L (ref 21–32)
COLOR UR: YELLOW
CREAT BLD-MCNC: 0.99 MG/DL
DEPRECATED RDW RBC AUTO: 42.7 FL (ref 35.1–46.3)
EOSINOPHIL # BLD AUTO: 0.2 X10(3) UL (ref 0–0.7)
EOSINOPHIL NFR BLD AUTO: 1.5 %
ERYTHROCYTE [DISTWIDTH] IN BLOOD BY AUTOMATED COUNT: 12.7 % (ref 11–15)
GLUCOSE BLD-MCNC: 252 MG/DL (ref 70–99)
GLUCOSE BLDC GLUCOMTR-MCNC: 216 MG/DL (ref 70–99)
GLUCOSE BLDC GLUCOMTR-MCNC: 245 MG/DL (ref 70–99)
GLUCOSE BLDC GLUCOMTR-MCNC: 248 MG/DL (ref 70–99)
GLUCOSE BLDC GLUCOMTR-MCNC: 259 MG/DL (ref 70–99)
GLUCOSE UR-MCNC: >=500 MG/DL
HAV IGM SER QL: 2.3 MG/DL (ref 1.6–2.6)
HCT VFR BLD AUTO: 40.7 %
HGB BLD-MCNC: 13.6 G/DL
HGB UR QL STRIP.AUTO: NEGATIVE
IMM GRANULOCYTES # BLD AUTO: 0.05 X10(3) UL (ref 0–1)
IMM GRANULOCYTES NFR BLD: 0.4 %
KETONES UR-MCNC: NEGATIVE MG/DL
LACTATE SERPL-SCNC: 2 MMOL/L (ref 0.4–2)
LEUKOCYTE ESTERASE UR QL STRIP.AUTO: NEGATIVE
LYMPHOCYTES # BLD AUTO: 1.8 X10(3) UL (ref 1–4)
LYMPHOCYTES NFR BLD AUTO: 13.9 %
MCH RBC QN AUTO: 30.8 PG (ref 26–34)
MCHC RBC AUTO-ENTMCNC: 33.4 G/DL (ref 31–37)
MCV RBC AUTO: 92.1 FL
MONOCYTES # BLD AUTO: 0.68 X10(3) UL (ref 0.1–1)
MONOCYTES NFR BLD AUTO: 5.2 %
NEUTROPHILS # BLD AUTO: 10.22 X10 (3) UL (ref 1.5–7.7)
NEUTROPHILS # BLD AUTO: 10.22 X10(3) UL (ref 1.5–7.7)
NEUTROPHILS NFR BLD AUTO: 78.8 %
NITRITE UR QL STRIP.AUTO: NEGATIVE
OSMOLALITY SERPL CALC.SUM OF ELEC: 292 MOSM/KG (ref 275–295)
PH UR: 5 [PH] (ref 5–8)
PLATELET # BLD AUTO: 198 10(3)UL (ref 150–450)
POTASSIUM SERPL-SCNC: 4.2 MMOL/L (ref 3.5–5.1)
PROT UR-MCNC: NEGATIVE MG/DL
RBC # BLD AUTO: 4.42 X10(6)UL
SARS-COV-2 RNA RESP QL NAA+PROBE: NOT DETECTED
SODIUM SERPL-SCNC: 136 MMOL/L (ref 136–145)
SP GR UR STRIP: 1.02 (ref 1–1.03)
UROBILINOGEN UR STRIP-ACNC: <2
WBC # BLD AUTO: 13 X10(3) UL (ref 4–11)

## 2021-08-17 PROCEDURE — 74250 X-RAY XM SM INT 1CNTRST STD: CPT | Performed by: SURGERY

## 2021-08-17 PROCEDURE — 74177 CT ABD & PELVIS W/CONTRAST: CPT | Performed by: EMERGENCY MEDICINE

## 2021-08-17 PROCEDURE — 71045 X-RAY EXAM CHEST 1 VIEW: CPT | Performed by: EMERGENCY MEDICINE

## 2021-08-17 RX ORDER — ONDANSETRON 2 MG/ML
4 INJECTION INTRAMUSCULAR; INTRAVENOUS EVERY 6 HOURS PRN
Status: DISCONTINUED | OUTPATIENT
Start: 2021-08-17 | End: 2021-08-18

## 2021-08-17 RX ORDER — DEXTROSE MONOHYDRATE 25 G/50ML
50 INJECTION, SOLUTION INTRAVENOUS
Status: DISCONTINUED | OUTPATIENT
Start: 2021-08-17 | End: 2021-08-18

## 2021-08-17 RX ORDER — SODIUM CHLORIDE 9 MG/ML
INJECTION, SOLUTION INTRAVENOUS CONTINUOUS
Status: DISCONTINUED | OUTPATIENT
Start: 2021-08-17 | End: 2021-08-18

## 2021-08-17 RX ORDER — HEPARIN SODIUM 5000 [USP'U]/ML
5000 INJECTION, SOLUTION INTRAVENOUS; SUBCUTANEOUS EVERY 12 HOURS SCHEDULED
Status: DISCONTINUED | OUTPATIENT
Start: 2021-08-17 | End: 2021-08-18

## 2021-08-17 RX ORDER — FAMOTIDINE 10 MG/ML
20 INJECTION, SOLUTION INTRAVENOUS ONCE
Status: COMPLETED | OUTPATIENT
Start: 2021-08-17 | End: 2021-08-17

## 2021-08-17 RX ORDER — MORPHINE SULFATE 2 MG/ML
1 INJECTION, SOLUTION INTRAMUSCULAR; INTRAVENOUS EVERY 4 HOURS PRN
Status: DISCONTINUED | OUTPATIENT
Start: 2021-08-17 | End: 2021-08-18

## 2021-08-17 NOTE — PROGRESS NOTES
Pt seen again in afternoon  Pt states minimal pain  Having diarrhea  Wants to eat    abd soft not tender    Reviewed images of SBFT  Still transition but no high grade obstruction    I discussed with pt findings  I explained possible failure of conservativ

## 2021-08-17 NOTE — PROGRESS NOTES
ADMISSION NOTE    65year old female with h/o uterine cancer s/p RT and THBSO, SBO which responded to NG suction, Diabetes presents with abdominal pain N and emesis. Found to have high grade distal SBO. [place] Available medical records partially reviewed.  Dict

## 2021-08-17 NOTE — PLAN OF CARE
NG tube this morning, no output, NG discontinued, IV fluids, small bowel x-ray this morning, multiple episodes of diarrhea today, patient feeling better, started on full liquid diet,     Problem: Patient Centered Care  Goal: Patient preferences are identif Evaluate effectiveness of GI medications  - Encourage mobilization and activity  - Obtain nutritional consult as needed  - Establish a toileting routine/schedule  - Consider collaborating with pharmacy to review patient's medication profile  Outcome: Not P

## 2021-08-17 NOTE — CM/SW NOTE
BPCI Bundled Advanced Medicare Program    Plan of care reviewed for care coordination and discharge planning.  Noted pt falls under  BPCI/Medicare program, with DRG Gastrointestinal Obstruction ([address], [address])      NSOC BARRIE Proposal:  Home    /case m

## 2021-08-17 NOTE — ED PROVIDER NOTES
Patient Seen in: [facility] AND [facility] Emergency Department      History   Patient presents with:  Abdomen/Flank Pain    Stated Complaint: GERD    HPI/Subjective:   HPI    49-year-old female with history of hysterectomy, diabetes, hyperlipidemia, here with c above.    Physical Exam     ED Triage Vitals [08/16/21 7461]   /80   Pulse 78   Resp 18   Temp 97 °F (36.1 °C)   Temp src Temporal   SpO2 97 %   O2 Device None (Room air)       Current:/80   Pulse 78   Temp 97 °F (36.1 °C) (Temporal)   Resp 18 - 5.30 x10(6)uL    HGB 13.6 12.0 - 16.0 g/dL    HCT 40.7 35.0 - 48.0 %    MCV 92.1 80.0 - 100.0 fL    MCH 30.8 26.0 - 34.0 pg    MCHC 33.4 31.0 - 37.0 g/dL    RDW-SD 42.7 35.1 - 46.3 fL    RDW 12.7 11.0 - 15.0 %    .0 150.0 - 450.0 10(3)uL    Neutro Mesenteric vasculature patent. No free air, portal venous gas, perforation or pneumatosis. Recommend surgical consult. The proximal small bowel and stomach are relatively decompressed. Appendix surgically absent.     Few small lymph nodes adjacent to based on the presenting problem including SBO, UTI, gastritis, GERD.                        Disposition and Plan     Clinical Impression:  Small bowel obstruction ([facility] [address]  (primary encounter diagnosis)     Disposition:  Admit  8/17/2021  4:40 am    Follow-up:

## 2021-08-17 NOTE — CONSULTS
[facility] HOSP - [facility]    Report of Consultation    Red Ruthadore Patient Status:  Inpatient    1950 MRN [id number]   Location [facility] 3W/SW Attending Magda Harrington MD   Hosp Day # 0 PCP Mary Rodas MD     Date of Admiss %.    General: Alert, orientated x3. Cooperative. No apparent distress. Minimal discomfort  HEENT: Exam is unremarkable. Without scleral icterus. Neck: No tenderness to palpitation. No JVD. Lungs: Clear to auscultation bilaterally.   Cardiac: Regu findings  Plan SBFT this AM      Fozia Schulte MD  8/17/2021  7:32 AM

## 2021-08-17 NOTE — ED QUICK NOTES
Orders for admission, patient is aware of plan and ready to go upstairs.  Any questions, please call ED RAQUEL jensen  at extension [phone]   Type of COVID test sent:rapid  COVID Suspicion level: Low    Titratable drug(s) infusing:none  Rate:    LOC at time of trans

## 2021-08-17 NOTE — ED INITIAL ASSESSMENT (HPI)
Pt presents to ED for generalized abdominal pain. Pt describes the pain as sharp. Pt states she has a hx of GERD. +Nausea.

## 2021-08-17 NOTE — H&P
[facility]    PATIENT'S NAME: Marcus Jones   ATTENDING PHYSICIAN: Main Chow MD   PATIENT ACCOUNT#:   [id number]    LOCATION:  [address] [place] [address] #:   [id number]       YOB: 1950  ADMISSION DATE:       08/16/2021 was normal, but there was no pneumatosis, thumb printing, or portal venous gas seen. There was some mild mesenteric edema and mild free peritoneal fluid, likely reactive.   The emergency room physician spoke with Dr. Lars Tobar and an NG tube was placed to low 12-point review of systems except as listed in the History of Present Illness. PHYSICAL EXAMINATION:  VITAL SIGNS:  Temperature was 97, pulse 82, respiratory rate 18, blood pressure 144/70, O2 saturation 97% on room air.   GENERAL:  She was somewhat anxi Dr. Brian Alford as needed. The patient's current clinical status and proposed treatment plan was discussed with her. All of her questions were answered and she agreed with the plan of care as outlined above.      Dictated By Arnel Fernandes MD  d: 54

## 2021-08-18 VITALS
SYSTOLIC BLOOD PRESSURE: 148 MMHG | HEART RATE: 69 BPM | HEIGHT: 66 IN | DIASTOLIC BLOOD PRESSURE: 62 MMHG | TEMPERATURE: 99 F | OXYGEN SATURATION: 100 % | BODY MASS INDEX: 30.13 KG/M2 | RESPIRATION RATE: 16 BRPM | WEIGHT: 187.5 LBS

## 2021-08-18 LAB
ALBUMIN SERPL-MCNC: 2.9 G/DL (ref 3.4–5)
ALBUMIN/GLOB SERPL: 0.8 {RATIO} (ref 1–2)
ALP LIVER SERPL-CCNC: 77 U/L
ALT SERPL-CCNC: 17 U/L
ANION GAP SERPL CALC-SCNC: 5 MMOL/L (ref 0–18)
AST SERPL-CCNC: 17 U/L (ref 15–37)
BASOPHILS # BLD AUTO: 0.03 X10(3) UL (ref 0–0.2)
BASOPHILS NFR BLD AUTO: 0.4 %
BILIRUB SERPL-MCNC: 0.5 MG/DL (ref 0.1–2)
BUN BLD-MCNC: 9 MG/DL (ref 7–18)
BUN/CREAT SERPL: 10.5 (ref 10–20)
C DIFF TOX B STL QL: NEGATIVE
CALCIUM BLD-MCNC: 8.5 MG/DL (ref 8.5–10.1)
CHLORIDE SERPL-SCNC: 106 MMOL/L (ref 98–112)
CO2 SERPL-SCNC: 25 MMOL/L (ref 21–32)
CREAT BLD-MCNC: 0.86 MG/DL
DEPRECATED RDW RBC AUTO: 43 FL (ref 35.1–46.3)
EOSINOPHIL # BLD AUTO: 0.16 X10(3) UL (ref 0–0.7)
EOSINOPHIL NFR BLD AUTO: 2.3 %
ERYTHROCYTE [DISTWIDTH] IN BLOOD BY AUTOMATED COUNT: 12.4 % (ref 11–15)
GLOBULIN PLAS-MCNC: 3.8 G/DL (ref 2.8–4.4)
GLUCOSE BLD-MCNC: 314 MG/DL (ref 70–99)
GLUCOSE BLDC GLUCOMTR-MCNC: 247 MG/DL (ref 70–99)
GLUCOSE BLDC GLUCOMTR-MCNC: 286 MG/DL (ref 70–99)
GLUCOSE BLDC GLUCOMTR-MCNC: 333 MG/DL (ref 70–99)
HCT VFR BLD AUTO: 42.3 %
HGB BLD-MCNC: 13.6 G/DL
IMM GRANULOCYTES # BLD AUTO: 0.01 X10(3) UL (ref 0–1)
IMM GRANULOCYTES NFR BLD: 0.1 %
LYMPHOCYTES # BLD AUTO: 2.37 X10(3) UL (ref 1–4)
LYMPHOCYTES NFR BLD AUTO: 34.4 %
M PROTEIN MFR SERPL ELPH: 6.7 G/DL (ref 6.4–8.2)
MCH RBC QN AUTO: 30.3 PG (ref 26–34)
MCHC RBC AUTO-ENTMCNC: 32.2 G/DL (ref 31–37)
MCV RBC AUTO: 94.2 FL
MONOCYTES # BLD AUTO: 0.58 X10(3) UL (ref 0.1–1)
MONOCYTES NFR BLD AUTO: 8.4 %
NEUTROPHILS # BLD AUTO: 3.73 X10 (3) UL (ref 1.5–7.7)
NEUTROPHILS # BLD AUTO: 3.73 X10(3) UL (ref 1.5–7.7)
NEUTROPHILS NFR BLD AUTO: 54.4 %
OSMOLALITY SERPL CALC.SUM OF ELEC: 293 MOSM/KG (ref 275–295)
PLATELET # BLD AUTO: 180 10(3)UL (ref 150–450)
POTASSIUM SERPL-SCNC: 3.9 MMOL/L (ref 3.5–5.1)
RBC # BLD AUTO: 4.49 X10(6)UL
SODIUM SERPL-SCNC: 136 MMOL/L (ref 136–145)
WBC # BLD AUTO: 6.9 X10(3) UL (ref 4–11)

## 2021-08-18 PROCEDURE — 99239 HOSP IP/OBS DSCHRG MGMT >30: CPT | Performed by: HOSPITALIST

## 2021-08-18 RX ORDER — ALPRAZOLAM 0.25 MG/1
0.25 TABLET ORAL 4 TIMES DAILY PRN
Status: DISCONTINUED | OUTPATIENT
Start: 2021-08-18 | End: 2021-08-18

## 2021-08-18 RX ORDER — LOSARTAN POTASSIUM 50 MG/1
50 TABLET ORAL DAILY
Status: DISCONTINUED | OUTPATIENT
Start: 2021-08-18 | End: 2021-08-18

## 2021-08-18 RX ORDER — PREGABALIN 75 MG/1
300 CAPSULE ORAL NIGHTLY
Status: DISCONTINUED | OUTPATIENT
Start: 2021-08-18 | End: 2021-08-18

## 2021-08-18 RX ORDER — ATORVASTATIN CALCIUM 10 MG/1
10 TABLET, FILM COATED ORAL NIGHTLY
Refills: 3 | Status: DISCONTINUED | OUTPATIENT
Start: 2021-08-18 | End: 2021-08-18

## 2021-08-18 NOTE — PLAN OF CARE
No nausea, abdominal pain, or diarrhea today, tolerating soft/low fiber diet, plan to discharge home this evening    Problem: Patient Centered Care  Goal: Patient preferences are identified and integrated in the patient's plan of care  Description: Sharri Carreno medications  - Encourage mobilization and activity  - Obtain nutritional consult as needed  - Establish a toileting routine/schedule  - Consider collaborating with pharmacy to review patient's medication profile  Outcome: Adequate for Discharge     Problem

## 2021-08-18 NOTE — PLAN OF CARE
Problem: Patient Centered Care  Goal: Patient preferences are identified and integrated in the patient's plan of care  Description: Interventions:  - What would you like us to know as we care for you?  I live with my daughter  - Provide timely, complete, to review patient's medication profile  Outcome: Progressing     Problem: PAIN - ADULT  Goal: Verbalizes/displays adequate comfort level or patient's stated pain goal  Description: INTERVENTIONS:  - Encourage pt to monitor pain and request assistance  - As

## 2021-08-18 NOTE — PROGRESS NOTES
[facility]    Progress Note    Swapna Clackamas Patient Status:  Inpatient    1950 MRN [id number]   Location [facility] 3W/SW Attending iGancarlo Mayorga MD   Hosp Day # 1 PCP Meng Valle MD     Subjective:     Pt with inc • losartan Potassium  50 mg Oral Daily   • pregabalin  300 mg Oral Nightly   • atorvastatin  10 mg Oral Nightly   • Heparin Sodium (Porcine)  5,000 Units Subcutaneous 2 times per day   • pantoprazole (PROTONIX) IV push  40 mg Intravenous Q24H   • Insul abrupt transition point in the right lower quadrant.   While underlying adhesion formation is a possibility, the ileum demonstrates alternating short segments of mild bowel wall thickening and intervening normal small bowel loops extending distal to the tra

## 2021-08-18 NOTE — DISCHARGE SUMMARY
[facility]    Discharge Summary    Luz Jackson Patient Status:  Inpatient    1950 MRN [id number]   Location [facility] 3W/SW Attending Wilbert Merritt MD   Hosp Day # 1 PCP Negrita Heard MD     Date of Admission:  states that at about 2 p.m. on Monday, she developed generalized abdominal pain. She tried to throw up, thinking that she would feel better, brought up some clear mucus, but no improvement in her symptoms.   She had a normal bowel movement at [facility]. CONTINUE taking these medications      Instructions Prescription details   ALPRAZolam 0.25 MG Tabs  Commonly known as: XANAX      Take 0.25 mg by mouth 4 (four) times daily as needed.    Refills: 5     glipiZIDE 5 MG Tabs  Commonly known as: Donna Nam

## 2021-08-18 NOTE — CM/SW NOTE
08/18/21 1312   Discharge disposition   Expected discharge disposition Home or Self   Post Acute Care Provider Home   Discharge transportation [facility]     Received notice from pt's RN/Leeann Gunderson pt to be [address] later today 8/18 and will need Medi

## 2021-09-08 ENCOUNTER — PATIENT OUTREACH (OUTPATIENT)
Dept: CASE MANAGEMENT | Age: 71
End: 2021-09-08

## 2021-09-08 RX ORDER — LORATADINE 10 MG/1
TABLET ORAL
Qty: 30 TABLET | Refills: 0 | Status: SHIPPED | OUTPATIENT
Start: 2021-09-08 | End: 2021-09-17

## 2021-09-17 ENCOUNTER — OFFICE VISIT (OUTPATIENT)
Dept: OTOLARYNGOLOGY | Facility: CLINIC | Age: 71
End: 2021-09-17
Payer: MEDICARE

## 2021-09-17 VITALS — WEIGHT: 187 LBS | BODY MASS INDEX: 30.05 KG/M2 | HEIGHT: 66 IN

## 2021-09-17 DIAGNOSIS — J30.9 ALLERGIC RHINITIS, UNSPECIFIED SEASONALITY, UNSPECIFIED TRIGGER: Primary | ICD-10-CM

## 2021-09-17 PROCEDURE — 1111F DSCHRG MED/CURRENT MED MERGE: CPT | Performed by: OTOLARYNGOLOGY

## 2021-09-17 PROCEDURE — 99213 OFFICE O/P EST LOW 20 MIN: CPT | Performed by: OTOLARYNGOLOGY

## 2021-09-17 RX ORDER — LORATADINE 10 MG/1
10 TABLET ORAL DAILY
Qty: 90 TABLET | Refills: 3 | Status: SHIPPED | OUTPATIENT
Start: 2021-09-17

## 2021-09-17 NOTE — PROGRESS NOTES
Perry Crawford is a 65year old female. Patient presents with: Follow - Up: pt is here today for her allergy follow up, she is doing great today and has no complaints.        HISTORY OF PRESENT ILLNESS  I have seen her in the past for halitosis which frank she has had complete resolution of all her symptoms and she is very happy with the results of her treatment. No other signs, symptoms or complaints      Social History    Socioeconomic History      Marital status:      Tobacco Use      Smoking statu Right: Normal, Left: Normal. Pupil - Right: Normal, Left: Normal. Fundus - Right: Normal, Left: Normal.   Neurological Normal Memory - Normal. Cranial nerves - Cranial nerves II through XII grossly intact.    Head/Face Normal Facial features - Normal. Beltran Rein these errors have been corrected. While every attempt is made to correct errors during dictation discrepancies may still exist    Fredi Lopez MD    9/17/2021    1:06 PM

## 2021-11-11 ENCOUNTER — LAB ENCOUNTER (OUTPATIENT)
Dept: LAB | Age: 71
End: 2021-11-11
Attending: INTERNAL MEDICINE
Payer: MEDICARE

## 2021-11-11 DIAGNOSIS — E78.5 HYPERLIPIDEMIA: ICD-10-CM

## 2021-11-11 DIAGNOSIS — E55.9 VITAMIN D DEFICIENCY: ICD-10-CM

## 2021-11-11 DIAGNOSIS — E11.9 TYPE 2 DIABETES MELLITUS (HCC): Primary | ICD-10-CM

## 2021-11-11 PROCEDURE — 80053 COMPREHEN METABOLIC PANEL: CPT

## 2021-11-11 PROCEDURE — 80061 LIPID PANEL: CPT

## 2021-11-11 PROCEDURE — 82306 VITAMIN D 25 HYDROXY: CPT

## 2021-11-11 PROCEDURE — 36415 COLL VENOUS BLD VENIPUNCTURE: CPT

## 2021-11-11 PROCEDURE — 83036 HEMOGLOBIN GLYCOSYLATED A1C: CPT

## 2021-12-09 ENCOUNTER — HOSPITAL ENCOUNTER (OUTPATIENT)
Dept: GENERAL RADIOLOGY | Facility: HOSPITAL | Age: 71
Discharge: HOME | End: 2021-12-09
Attending: FAMILY MEDICINE
Payer: MEDICARE

## 2021-12-09 DIAGNOSIS — M25.511 RIGHT SHOULDER PAIN: ICD-10-CM

## 2021-12-09 PROCEDURE — 73030 X-RAY EXAM OF SHOULDER: CPT | Performed by: FAMILY MEDICINE

## 2022-01-17 ENCOUNTER — OFFICE VISIT (OUTPATIENT)
Dept: ORTHOPEDICS CLINIC | Facility: CLINIC | Age: 72
End: 2022-01-17
Payer: MEDICARE

## 2022-01-17 DIAGNOSIS — M75.21 BICEPS TENDINITIS OF RIGHT SHOULDER: ICD-10-CM

## 2022-01-17 DIAGNOSIS — M75.31 CALCIFIC TENDINITIS OF RIGHT SHOULDER: Primary | ICD-10-CM

## 2022-01-17 PROCEDURE — 20610 DRAIN/INJ JOINT/BURSA W/O US: CPT | Performed by: ORTHOPAEDIC SURGERY

## 2022-01-17 PROCEDURE — 99214 OFFICE O/P EST MOD 30 MIN: CPT | Performed by: ORTHOPAEDIC SURGERY

## 2022-01-17 RX ORDER — TRIAMCINOLONE ACETONIDE 40 MG/ML
40 INJECTION, SUSPENSION INTRA-ARTICULAR; INTRAMUSCULAR ONCE
Status: COMPLETED | OUTPATIENT
Start: 2022-01-17 | End: 2022-01-17

## 2022-01-17 NOTE — H&P
NURSING INTAKE COMMENTS: Patient presents with:  Shoulder Pain: Right shoulder pain, patient had xrays taken 12/9/21. Patient states that she has had the pain for several years, she states that the weather had alot to do with it.  Patient can't raise right Years: 10.00      Smokeless tobacco: Never Used    Vaping Use      Vaping Use: Never used    Substance and Sexual Activity      Alcohol use: No        Alcohol/week: 0.0 standard drinks      Drug use: No      Sexual activity: Not on file       Review of Sys Value Date    GLU 82 11/11/2021    BUN 15 11/11/2021    CREATSERUM 1.01 11/11/2021    GFRNAA 55 (L) 11/11/2021    GFRAA 64 11/11/2021        Assessment and Plan:  Diagnoses and all orders for this visit:    Calcific tendinitis of right shoulder  -     ABHAY

## 2022-01-17 NOTE — PROGRESS NOTES
Per verbal order from Dr. Navarro [facility], draw up 5ml of 0.5% Marcaine and 1ml of Kenalog 40 for cortisone injection to the right shoulder

## 2022-01-24 ENCOUNTER — TELEPHONE (OUTPATIENT)
Dept: PHYSICAL THERAPY | Facility: HOSPITAL | Age: 72
End: 2022-01-24

## 2022-01-24 NOTE — TELEPHONE ENCOUNTER
Called patient to offer earlier evaluation and follow-up dates. Confirmed today's date and offered openings for today, Wednesday, and next week. Left call back number.

## 2022-01-31 ENCOUNTER — OFFICE VISIT (OUTPATIENT)
Dept: ORTHOPEDICS CLINIC | Facility: CLINIC | Age: 72
End: 2022-01-31
Payer: MEDICARE

## 2022-01-31 VITALS
WEIGHT: 187 LBS | HEIGHT: 66 IN | HEART RATE: 80 BPM | BODY MASS INDEX: 30.05 KG/M2 | DIASTOLIC BLOOD PRESSURE: 76 MMHG | SYSTOLIC BLOOD PRESSURE: 123 MMHG

## 2022-01-31 DIAGNOSIS — M17.11 PRIMARY OSTEOARTHRITIS OF RIGHT KNEE: Primary | ICD-10-CM

## 2022-01-31 PROCEDURE — 20610 DRAIN/INJ JOINT/BURSA W/O US: CPT | Performed by: PHYSICIAN ASSISTANT

## 2022-01-31 PROCEDURE — 99213 OFFICE O/P EST LOW 20 MIN: CPT | Performed by: PHYSICIAN ASSISTANT

## 2022-01-31 RX ORDER — TRIAMCINOLONE ACETONIDE 40 MG/ML
40 INJECTION, SUSPENSION INTRA-ARTICULAR; INTRAMUSCULAR ONCE
Status: COMPLETED | OUTPATIENT
Start: 2022-01-31 | End: 2022-01-31

## 2022-01-31 RX ADMIN — TRIAMCINOLONE ACETONIDE 40 MG: 40 INJECTION, SUSPENSION INTRA-ARTICULAR; INTRAMUSCULAR at 12:56:00

## 2022-01-31 NOTE — PROGRESS NOTES
Per verbal order from Dr. Navarro Mom, draw up 5ml of 0.5% Marcaine and 1ml of Kenalog 40 for cortisone injection to right knee Liya Perrin CMA  Patient provided education handout for cortisone injection.    Patient left office prior to obtaining post injection v

## 2022-01-31 NOTE — PROGRESS NOTES
NURSING INTAKE COMMENTS: Patient presents with:  Knee Pain: right knee pain, pain off and on,started summer of 2021, pain at a 6/10 today, would like cortisone inj today      HPI: This 66year old female presents today with right knee pain.   We last saw he Never used    Substance and Sexual Activity      Alcohol use: No        Alcohol/week: 0.0 standard drinks      Drug use: No      Sexual activity: Not on file       Review of Systems:  GENERAL: feels generally well, no significant weight loss or weight gain (KENALOG-40) 40 MG/ML injection 40 mg  -     DRAIN/INJECT LARGE JOINT/BURSA        Assessment: Right knee osteoarthritis not interested in knee replacement surgery. Plan: I offered patient cortisone directed onto in the office today.   Risks, benefits, a

## 2022-02-07 ENCOUNTER — TELEPHONE (OUTPATIENT)
Dept: PHYSICAL THERAPY | Facility: HOSPITAL | Age: 72
End: 2022-02-07

## 2022-02-08 ENCOUNTER — OFFICE VISIT (OUTPATIENT)
Dept: PHYSICAL THERAPY | Facility: HOSPITAL | Age: 72
End: 2022-02-08
Attending: ORTHOPAEDIC SURGERY
Payer: MEDICARE

## 2022-02-08 DIAGNOSIS — M75.31 CALCIFIC TENDINITIS OF RIGHT SHOULDER: ICD-10-CM

## 2022-02-08 DIAGNOSIS — M75.21 BICEPS TENDINITIS OF RIGHT SHOULDER: ICD-10-CM

## 2022-02-08 PROCEDURE — 97161 PT EVAL LOW COMPLEX 20 MIN: CPT

## 2022-02-08 PROCEDURE — 97110 THERAPEUTIC EXERCISES: CPT

## 2022-02-10 ENCOUNTER — OFFICE VISIT (OUTPATIENT)
Dept: PHYSICAL THERAPY | Facility: HOSPITAL | Age: 72
End: 2022-02-10
Attending: ORTHOPAEDIC SURGERY
Payer: MEDICARE

## 2022-02-10 PROCEDURE — 97110 THERAPEUTIC EXERCISES: CPT

## 2022-02-10 PROCEDURE — 97140 MANUAL THERAPY 1/> REGIONS: CPT

## 2022-02-15 ENCOUNTER — OFFICE VISIT (OUTPATIENT)
Dept: PHYSICAL THERAPY | Facility: HOSPITAL | Age: 72
End: 2022-02-15
Attending: ORTHOPAEDIC SURGERY
Payer: MEDICARE

## 2022-02-15 PROCEDURE — 97140 MANUAL THERAPY 1/> REGIONS: CPT

## 2022-02-15 PROCEDURE — 97110 THERAPEUTIC EXERCISES: CPT

## 2022-02-17 ENCOUNTER — TELEPHONE (OUTPATIENT)
Dept: PHYSICAL THERAPY | Facility: HOSPITAL | Age: 72
End: 2022-02-17

## 2022-02-17 ENCOUNTER — APPOINTMENT (OUTPATIENT)
Dept: PHYSICAL THERAPY | Facility: HOSPITAL | Age: 72
End: 2022-02-17
Attending: ORTHOPAEDIC SURGERY
Payer: MEDICARE

## 2022-02-22 ENCOUNTER — OFFICE VISIT (OUTPATIENT)
Dept: PHYSICAL THERAPY | Facility: HOSPITAL | Age: 72
End: 2022-02-22
Attending: ORTHOPAEDIC SURGERY
Payer: MEDICARE

## 2022-02-22 PROCEDURE — 97140 MANUAL THERAPY 1/> REGIONS: CPT

## 2022-02-22 PROCEDURE — 97110 THERAPEUTIC EXERCISES: CPT

## 2022-02-24 ENCOUNTER — OFFICE VISIT (OUTPATIENT)
Dept: PHYSICAL THERAPY | Facility: HOSPITAL | Age: 72
End: 2022-02-24
Attending: ORTHOPAEDIC SURGERY
Payer: MEDICARE

## 2022-02-24 PROCEDURE — 97110 THERAPEUTIC EXERCISES: CPT

## 2022-02-28 ENCOUNTER — OFFICE VISIT (OUTPATIENT)
Dept: PHYSICAL THERAPY | Facility: HOSPITAL | Age: 72
End: 2022-02-28
Attending: FAMILY MEDICINE
Payer: MEDICARE

## 2022-02-28 PROCEDURE — 97110 THERAPEUTIC EXERCISES: CPT

## 2022-03-01 ENCOUNTER — APPOINTMENT (OUTPATIENT)
Dept: PHYSICAL THERAPY | Facility: HOSPITAL | Age: 72
End: 2022-03-01
Attending: ORTHOPAEDIC SURGERY
Payer: MEDICARE

## 2022-03-03 ENCOUNTER — APPOINTMENT (OUTPATIENT)
Dept: PHYSICAL THERAPY | Facility: HOSPITAL | Age: 72
End: 2022-03-03
Attending: ORTHOPAEDIC SURGERY
Payer: MEDICARE

## 2022-03-14 ENCOUNTER — TELEPHONE (OUTPATIENT)
Dept: ORTHOPEDICS CLINIC | Facility: CLINIC | Age: 72
End: 2022-03-14

## 2022-03-14 NOTE — TELEPHONE ENCOUNTER
Pt called requesting to speak to the nurse regarding an open sore on the leg. Pt is scheduled for an appointment 3-29-22.   Please call

## 2022-03-14 NOTE — TELEPHONE ENCOUNTER
S/w pt and she states she developed a blister on her left lateral lower leg close to ankle on top of an old scar she had and it popped yesterday and has been seeping clearing water like fluid. She denies any redness, swelling, warmth. No injury. Pt states she has had issues with water in both legs and she has been dieting and that has helped. Pt states Dr. Lexei Pleitez told her before that he thought the knee replacement was loose and to let him know if she was having problems. Pt has appt on 3/29. Advised her Dr. Lexie Pleitez can eval left knee at that time, but any concerns for blister/ water retention in legs she should f/u with her pcp/UC. She verbalized understanding.

## 2022-03-29 ENCOUNTER — OFFICE VISIT (OUTPATIENT)
Dept: ORTHOPEDICS CLINIC | Facility: CLINIC | Age: 72
End: 2022-03-29
Payer: MEDICARE

## 2022-03-29 ENCOUNTER — HOSPITAL ENCOUNTER (OUTPATIENT)
Dept: GENERAL RADIOLOGY | Facility: HOSPITAL | Age: 72
Discharge: HOME | End: 2022-03-29
Attending: ORTHOPAEDIC SURGERY
Payer: MEDICARE

## 2022-03-29 DIAGNOSIS — R52 PAIN: ICD-10-CM

## 2022-03-29 DIAGNOSIS — M79.89 LEFT LEG SWELLING: Primary | ICD-10-CM

## 2022-03-29 PROCEDURE — 73590 X-RAY EXAM OF LOWER LEG: CPT | Performed by: ORTHOPAEDIC SURGERY

## 2022-03-29 PROCEDURE — 99214 OFFICE O/P EST MOD 30 MIN: CPT | Performed by: ORTHOPAEDIC SURGERY

## 2022-03-29 RX ORDER — MELOXICAM 7.5 MG/1
TABLET ORAL
COMMUNITY
Start: 2022-03-05

## 2022-05-19 ENCOUNTER — LAB ENCOUNTER (OUTPATIENT)
Dept: LAB | Age: 72
End: 2022-05-19
Attending: INTERNAL MEDICINE
Payer: MEDICARE

## 2022-05-19 DIAGNOSIS — E11.9 TYPE 2 DIABETES MELLITUS (HCC): Primary | ICD-10-CM

## 2022-05-19 DIAGNOSIS — E78.5 HYPERLIPIDEMIA: ICD-10-CM

## 2022-05-19 LAB
ALBUMIN SERPL-MCNC: 3.5 G/DL (ref 3.4–5)
ALBUMIN/GLOB SERPL: 1 {RATIO} (ref 1–2)
ALP LIVER SERPL-CCNC: 73 U/L
ALT SERPL-CCNC: 21 U/L
ANION GAP SERPL CALC-SCNC: 6 MMOL/L (ref 0–18)
AST SERPL-CCNC: 17 U/L (ref 15–37)
BILIRUB SERPL-MCNC: 0.4 MG/DL (ref 0.1–2)
BUN BLD-MCNC: 15 MG/DL (ref 7–18)
BUN/CREAT SERPL: 15.2 (ref 10–20)
CALCIUM BLD-MCNC: 9.2 MG/DL (ref 8.5–10.1)
CHLORIDE SERPL-SCNC: 112 MMOL/L (ref 98–112)
CHOLEST SERPL-MCNC: 149 MG/DL (ref ?–200)
CO2 SERPL-SCNC: 27 MMOL/L (ref 21–32)
CREAT BLD-MCNC: 0.99 MG/DL
CREAT UR-SCNC: 246 MG/DL
EST. AVERAGE GLUCOSE BLD GHB EST-MCNC: 154 MG/DL (ref 68–126)
FASTING PATIENT LIPID ANSWER: YES
FASTING STATUS PATIENT QL REPORTED: YES
GLOBULIN PLAS-MCNC: 3.5 G/DL (ref 2.8–4.4)
GLUCOSE BLD-MCNC: 81 MG/DL (ref 70–99)
HBA1C MFR BLD: 7 % (ref ?–5.7)
HDLC SERPL-MCNC: 55 MG/DL (ref 40–59)
LDLC SERPL CALC-MCNC: 80 MG/DL (ref ?–100)
MICROALBUMIN UR-MCNC: 4.33 MG/DL
MICROALBUMIN/CREAT 24H UR-RTO: 17.6 UG/MG (ref ?–30)
NONHDLC SERPL-MCNC: 94 MG/DL (ref ?–130)
OSMOLALITY SERPL CALC.SUM OF ELEC: 300 MOSM/KG (ref 275–295)
POTASSIUM SERPL-SCNC: 3.9 MMOL/L (ref 3.5–5.1)
PROT SERPL-MCNC: 7 G/DL (ref 6.4–8.2)
SODIUM SERPL-SCNC: 145 MMOL/L (ref 136–145)
TRIGL SERPL-MCNC: 72 MG/DL (ref 30–149)
VLDLC SERPL CALC-MCNC: 11 MG/DL (ref 0–30)

## 2022-05-19 PROCEDURE — 80061 LIPID PANEL: CPT

## 2022-05-19 PROCEDURE — 83036 HEMOGLOBIN GLYCOSYLATED A1C: CPT

## 2022-05-19 PROCEDURE — 82043 UR ALBUMIN QUANTITATIVE: CPT

## 2022-05-19 PROCEDURE — 82570 ASSAY OF URINE CREATININE: CPT

## 2022-05-19 PROCEDURE — 80053 COMPREHEN METABOLIC PANEL: CPT

## 2022-05-19 PROCEDURE — 36415 COLL VENOUS BLD VENIPUNCTURE: CPT

## 2022-07-07 ENCOUNTER — OFFICE VISIT (OUTPATIENT)
Dept: PHYSICAL THERAPY | Facility: HOSPITAL | Age: 72
End: 2022-07-07
Attending: ORTHOPAEDIC SURGERY
Payer: MEDICARE

## 2022-07-07 DIAGNOSIS — M79.89 LEFT LEG SWELLING: ICD-10-CM

## 2022-07-07 DIAGNOSIS — R52 PAIN: ICD-10-CM

## 2022-07-07 PROCEDURE — 97140 MANUAL THERAPY 1/> REGIONS: CPT | Performed by: PHYSICAL THERAPIST

## 2022-07-07 PROCEDURE — 97163 PT EVAL HIGH COMPLEX 45 MIN: CPT | Performed by: PHYSICAL THERAPIST

## 2022-07-11 ENCOUNTER — TELEPHONE (OUTPATIENT)
Dept: ORTHOPEDICS CLINIC | Facility: CLINIC | Age: 72
End: 2022-07-11

## 2022-07-14 ENCOUNTER — HOSPITAL ENCOUNTER (OUTPATIENT)
Dept: ULTRASOUND IMAGING | Facility: HOSPITAL | Age: 72
Discharge: HOME | End: 2022-07-14
Attending: FAMILY MEDICINE
Payer: MEDICARE

## 2022-07-14 DIAGNOSIS — R10.9 ABDOMINAL PAIN: ICD-10-CM

## 2022-07-14 PROCEDURE — 76700 US EXAM ABDOM COMPLETE: CPT | Performed by: FAMILY MEDICINE

## 2022-07-19 ENCOUNTER — OFFICE VISIT (OUTPATIENT)
Dept: PHYSICAL THERAPY | Facility: HOSPITAL | Age: 72
End: 2022-07-19
Attending: ORTHOPAEDIC SURGERY
Payer: MEDICARE

## 2022-07-19 PROCEDURE — 97140 MANUAL THERAPY 1/> REGIONS: CPT

## 2022-07-19 PROCEDURE — 97110 THERAPEUTIC EXERCISES: CPT

## 2022-07-20 ENCOUNTER — APPOINTMENT (OUTPATIENT)
Dept: PHYSICAL THERAPY | Facility: HOSPITAL | Age: 72
End: 2022-07-20
Attending: ORTHOPAEDIC SURGERY
Payer: MEDICARE

## 2022-07-22 ENCOUNTER — OFFICE VISIT (OUTPATIENT)
Dept: PHYSICAL THERAPY | Facility: HOSPITAL | Age: 72
End: 2022-07-22
Attending: ORTHOPAEDIC SURGERY
Payer: MEDICARE

## 2022-07-22 DIAGNOSIS — I89.0 LYMPHEDEMA: Primary | ICD-10-CM

## 2022-07-22 PROCEDURE — 97140 MANUAL THERAPY 1/> REGIONS: CPT | Performed by: PHYSICAL THERAPIST

## 2022-07-25 ENCOUNTER — OFFICE VISIT (OUTPATIENT)
Dept: PHYSICAL THERAPY | Facility: HOSPITAL | Age: 72
End: 2022-07-25
Attending: ORTHOPAEDIC SURGERY
Payer: MEDICARE

## 2022-07-25 PROCEDURE — 97140 MANUAL THERAPY 1/> REGIONS: CPT | Performed by: PHYSICAL THERAPIST

## 2022-07-26 ENCOUNTER — APPOINTMENT (OUTPATIENT)
Dept: PHYSICAL THERAPY | Facility: HOSPITAL | Age: 72
End: 2022-07-26
Attending: ORTHOPAEDIC SURGERY
Payer: MEDICARE

## 2022-07-26 ENCOUNTER — HOSPITAL ENCOUNTER (OUTPATIENT)
Dept: GENERAL RADIOLOGY | Facility: HOSPITAL | Age: 72
Discharge: HOME | End: 2022-07-26
Attending: ORTHOPAEDIC SURGERY
Payer: MEDICARE

## 2022-07-26 ENCOUNTER — OFFICE VISIT (OUTPATIENT)
Dept: ORTHOPEDICS CLINIC | Facility: CLINIC | Age: 72
End: 2022-07-26
Payer: MEDICARE

## 2022-07-26 VITALS — DIASTOLIC BLOOD PRESSURE: 71 MMHG | SYSTOLIC BLOOD PRESSURE: 152 MMHG | HEART RATE: 79 BPM

## 2022-07-26 DIAGNOSIS — R52 PAIN: ICD-10-CM

## 2022-07-26 DIAGNOSIS — E66.09 CLASS 1 OBESITY DUE TO EXCESS CALORIES WITH SERIOUS COMORBIDITY AND BODY MASS INDEX (BMI) OF 30.0 TO 30.9 IN ADULT: ICD-10-CM

## 2022-07-26 DIAGNOSIS — M17.11 PRIMARY OSTEOARTHRITIS OF RIGHT KNEE: Primary | ICD-10-CM

## 2022-07-26 PROCEDURE — 20610 DRAIN/INJ JOINT/BURSA W/O US: CPT

## 2022-07-26 PROCEDURE — 99213 OFFICE O/P EST LOW 20 MIN: CPT

## 2022-07-26 PROCEDURE — 73564 X-RAY EXAM KNEE 4 OR MORE: CPT | Performed by: ORTHOPAEDIC SURGERY

## 2022-07-26 RX ORDER — TRIAMCINOLONE ACETONIDE 40 MG/ML
40 INJECTION, SUSPENSION INTRA-ARTICULAR; INTRAMUSCULAR ONCE
Status: COMPLETED | OUTPATIENT
Start: 2022-07-26 | End: 2022-07-26

## 2022-07-26 RX ADMIN — TRIAMCINOLONE ACETONIDE 40 MG: 40 INJECTION, SUSPENSION INTRA-ARTICULAR; INTRAMUSCULAR at 14:07:00

## 2022-07-26 NOTE — PROGRESS NOTES
Per verbal order from Dr. Jhon Pineda, draw up 5ml of 0.5% Marcaine and 1ml of Kenalog 40 for cortisone injection to Right knee.  Ashtyn Gallegos

## 2022-07-28 ENCOUNTER — OFFICE VISIT (OUTPATIENT)
Dept: PHYSICAL THERAPY | Facility: HOSPITAL | Age: 72
End: 2022-07-28
Attending: ORTHOPAEDIC SURGERY
Payer: MEDICARE

## 2022-07-28 PROCEDURE — 97140 MANUAL THERAPY 1/> REGIONS: CPT | Performed by: PHYSICAL THERAPIST

## 2022-08-02 ENCOUNTER — APPOINTMENT (OUTPATIENT)
Dept: PHYSICAL THERAPY | Facility: HOSPITAL | Age: 72
End: 2022-08-02
Attending: ORTHOPAEDIC SURGERY
Payer: MEDICARE

## 2022-08-04 ENCOUNTER — APPOINTMENT (OUTPATIENT)
Dept: PHYSICAL THERAPY | Facility: HOSPITAL | Age: 72
End: 2022-08-04
Attending: ORTHOPAEDIC SURGERY
Payer: MEDICARE

## 2022-08-05 ENCOUNTER — OFFICE VISIT (OUTPATIENT)
Dept: PHYSICAL THERAPY | Facility: HOSPITAL | Age: 72
End: 2022-08-05
Attending: ORTHOPAEDIC SURGERY
Payer: MEDICARE

## 2022-08-05 PROCEDURE — 97140 MANUAL THERAPY 1/> REGIONS: CPT

## 2022-08-08 ENCOUNTER — APPOINTMENT (OUTPATIENT)
Dept: PHYSICAL THERAPY | Facility: HOSPITAL | Age: 72
End: 2022-08-08
Attending: ORTHOPAEDIC SURGERY
Payer: MEDICARE

## 2022-08-08 ENCOUNTER — TELEPHONE (OUTPATIENT)
Dept: ORTHOPEDICS CLINIC | Facility: CLINIC | Age: 72
End: 2022-08-08

## 2022-08-08 NOTE — TELEPHONE ENCOUNTER
Per 7/26/22 OV note-  \"She will give us a call in 1-1/2 to 2 weeks if her pain persists and we will proceed with gel injections at that time\"  S/w pt and she confirmed she continues to have pain despite the cortisone injection at 7/26 visit.  appt made on 8/15 for Durolane injection

## 2022-08-10 ENCOUNTER — OFFICE VISIT (OUTPATIENT)
Dept: PHYSICAL THERAPY | Facility: HOSPITAL | Age: 72
End: 2022-08-10
Attending: ORTHOPAEDIC SURGERY
Payer: MEDICARE

## 2022-08-10 PROCEDURE — 97140 MANUAL THERAPY 1/> REGIONS: CPT

## 2022-08-12 ENCOUNTER — APPOINTMENT (OUTPATIENT)
Dept: PHYSICAL THERAPY | Facility: HOSPITAL | Age: 72
End: 2022-08-12
Attending: ORTHOPAEDIC SURGERY
Payer: MEDICARE

## 2022-08-15 ENCOUNTER — OFFICE VISIT (OUTPATIENT)
Dept: ORTHOPEDICS CLINIC | Facility: CLINIC | Age: 72
End: 2022-08-15
Payer: MEDICARE

## 2022-08-15 ENCOUNTER — APPOINTMENT (OUTPATIENT)
Dept: PHYSICAL THERAPY | Facility: HOSPITAL | Age: 72
End: 2022-08-15
Attending: ORTHOPAEDIC SURGERY
Payer: MEDICARE

## 2022-08-15 VITALS — HEART RATE: 76 BPM | DIASTOLIC BLOOD PRESSURE: 70 MMHG | SYSTOLIC BLOOD PRESSURE: 122 MMHG

## 2022-08-15 DIAGNOSIS — E66.09 CLASS 1 OBESITY DUE TO EXCESS CALORIES WITH SERIOUS COMORBIDITY AND BODY MASS INDEX (BMI) OF 30.0 TO 30.9 IN ADULT: ICD-10-CM

## 2022-08-15 DIAGNOSIS — M17.11 PRIMARY OSTEOARTHRITIS OF RIGHT KNEE: Primary | ICD-10-CM

## 2022-08-15 DIAGNOSIS — R52 PAIN: ICD-10-CM

## 2022-08-17 ENCOUNTER — OFFICE VISIT (OUTPATIENT)
Dept: PHYSICAL THERAPY | Facility: HOSPITAL | Age: 72
End: 2022-08-17
Attending: ORTHOPAEDIC SURGERY
Payer: MEDICARE

## 2022-08-17 PROCEDURE — 97140 MANUAL THERAPY 1/> REGIONS: CPT

## 2022-08-19 ENCOUNTER — APPOINTMENT (OUTPATIENT)
Dept: PHYSICAL THERAPY | Facility: HOSPITAL | Age: 72
End: 2022-08-19
Attending: ORTHOPAEDIC SURGERY
Payer: MEDICARE

## 2022-08-22 ENCOUNTER — APPOINTMENT (OUTPATIENT)
Dept: PHYSICAL THERAPY | Facility: HOSPITAL | Age: 72
End: 2022-08-22
Attending: ORTHOPAEDIC SURGERY
Payer: MEDICARE

## 2022-08-24 ENCOUNTER — APPOINTMENT (OUTPATIENT)
Dept: PHYSICAL THERAPY | Facility: HOSPITAL | Age: 72
End: 2022-08-24
Attending: ORTHOPAEDIC SURGERY
Payer: MEDICARE

## 2022-08-26 ENCOUNTER — OFFICE VISIT (OUTPATIENT)
Dept: PHYSICAL THERAPY | Facility: HOSPITAL | Age: 72
End: 2022-08-26
Attending: ORTHOPAEDIC SURGERY
Payer: MEDICARE

## 2022-08-26 ENCOUNTER — TELEPHONE (OUTPATIENT)
Dept: ORTHOPEDICS CLINIC | Facility: CLINIC | Age: 72
End: 2022-08-26

## 2022-08-26 DIAGNOSIS — E66.09 CLASS 1 OBESITY DUE TO EXCESS CALORIES WITH SERIOUS COMORBIDITY AND BODY MASS INDEX (BMI) OF 31.0 TO 31.9 IN ADULT: ICD-10-CM

## 2022-08-26 DIAGNOSIS — M17.11 PRIMARY OSTEOARTHRITIS OF RIGHT KNEE: ICD-10-CM

## 2022-08-26 DIAGNOSIS — M23.91 INTERNAL DERANGEMENT OF MULTIPLE SITES OF RIGHT KNEE: Primary | ICD-10-CM

## 2022-08-26 PROCEDURE — 97140 MANUAL THERAPY 1/> REGIONS: CPT | Performed by: PHYSICAL THERAPIST

## 2022-08-26 NOTE — TELEPHONE ENCOUNTER
Per pt got a durolane injection 8/15 and has had no relief. Per pt wants to move forward with knee replacement surgery.  Please advise

## 2022-08-26 NOTE — TELEPHONE ENCOUNTER
MRI [facility] protocol right knee order on chart. Patient can have an appointment in September. She had a cortisone injection to the right knee 7/26/2022. Her knee replacement should be scheduled sometime in late October 2022 to reduce the risk of infection from the cortisone injection. Usually we like to wait 3 months from the cortisone injection, not the Durolane injection that she had in August.  She can start getting the MRI now we can see her in September to pick a date in October.

## 2022-08-29 NOTE — TELEPHONE ENCOUNTER
Spoke with patient and she decided to have the MRI and scheduled for an Appointment on September 27 @ 11:40a

## 2022-09-15 ENCOUNTER — HOSPITAL ENCOUNTER (OUTPATIENT)
Dept: MRI IMAGING | Facility: HOSPITAL | Age: 72
Discharge: HOME | End: 2022-09-15
Attending: ORTHOPAEDIC SURGERY
Payer: MEDICARE

## 2022-09-15 DIAGNOSIS — E66.09 CLASS 1 OBESITY DUE TO EXCESS CALORIES WITH SERIOUS COMORBIDITY AND BODY MASS INDEX (BMI) OF 31.0 TO 31.9 IN ADULT: ICD-10-CM

## 2022-09-15 DIAGNOSIS — M23.91 INTERNAL DERANGEMENT OF MULTIPLE SITES OF RIGHT KNEE: ICD-10-CM

## 2022-09-15 DIAGNOSIS — M17.11 PRIMARY OSTEOARTHRITIS OF RIGHT KNEE: ICD-10-CM

## 2022-09-15 PROCEDURE — 73721 MRI JNT OF LWR EXTRE W/O DYE: CPT | Performed by: ORTHOPAEDIC SURGERY

## 2022-09-21 RX ORDER — LORATADINE 10 MG/1
TABLET ORAL
Qty: 90 TABLET | Refills: 0 | Status: SHIPPED | OUTPATIENT
Start: 2022-09-21

## 2022-09-27 ENCOUNTER — OFFICE VISIT (OUTPATIENT)
Dept: ORTHOPEDICS CLINIC | Facility: CLINIC | Age: 72
End: 2022-09-27

## 2022-09-27 VITALS — HEIGHT: 66 IN | BODY MASS INDEX: 29.57 KG/M2 | WEIGHT: 184 LBS

## 2022-09-27 DIAGNOSIS — M17.11 PRIMARY OSTEOARTHRITIS OF RIGHT KNEE: Primary | ICD-10-CM

## 2022-09-27 DIAGNOSIS — M23.91 INTERNAL DERANGEMENT OF MULTIPLE SITES OF RIGHT KNEE: ICD-10-CM

## 2022-09-27 PROCEDURE — 99213 OFFICE O/P EST LOW 20 MIN: CPT | Performed by: ORTHOPAEDIC SURGERY

## 2022-09-29 ENCOUNTER — TELEPHONE (OUTPATIENT)
Dept: ORTHOPEDICS CLINIC | Facility: CLINIC | Age: 72
End: 2022-09-29

## 2022-09-29 ENCOUNTER — OFFICE VISIT (OUTPATIENT)
Dept: OTOLARYNGOLOGY | Facility: CLINIC | Age: 72
End: 2022-09-29

## 2022-09-29 VITALS — HEIGHT: 66 IN | BODY MASS INDEX: 29.57 KG/M2 | WEIGHT: 184 LBS

## 2022-09-29 DIAGNOSIS — M23.91 INTERNAL DERANGEMENT OF MULTIPLE SITES OF RIGHT KNEE: ICD-10-CM

## 2022-09-29 DIAGNOSIS — M17.11 PRIMARY OSTEOARTHRITIS OF RIGHT KNEE: Primary | ICD-10-CM

## 2022-09-29 DIAGNOSIS — J30.9 ALLERGIC RHINITIS, UNSPECIFIED SEASONALITY, UNSPECIFIED TRIGGER: Primary | ICD-10-CM

## 2022-09-29 PROCEDURE — 99213 OFFICE O/P EST LOW 20 MIN: CPT | Performed by: OTOLARYNGOLOGY

## 2022-09-29 RX ORDER — LORATADINE 10 MG/1
10 TABLET ORAL DAILY
Qty: 90 TABLET | Refills: 3 | Status: SHIPPED | OUTPATIENT
Start: 2022-09-29

## 2022-09-29 NOTE — TELEPHONE ENCOUNTER
Type of surgery:  Right total knee arthroplasty  Date: 12/8/22  Location: [address]  Medical Clearance:      *Medical: Yes      *Dental: Yes      *Other:  Prior Authorization Status: Pending  Workers Comp:  Medacta/Manuel: [id number]  [place]: Yes  POV: 12/27/22

## 2022-11-14 ENCOUNTER — TELEPHONE (OUTPATIENT)
Dept: ORTHOPEDICS CLINIC | Facility: CLINIC | Age: 72
End: 2022-11-14

## 2022-11-14 NOTE — TELEPHONE ENCOUNTER
Patient requesting call back. States she is trying to follow up on clearance \"work order\" surgery scheduled for 12/08.  Please advise

## 2022-11-14 NOTE — TELEPHONE ENCOUNTER
S/w pt and she is calling to see if medical clearance was rc'd from pcp.  I advised her I would fwd to sx scheduling to check and they will f/u

## 2022-11-15 ENCOUNTER — LAB ENCOUNTER (OUTPATIENT)
Dept: LAB | Age: 72
End: 2022-11-15
Attending: INTERNAL MEDICINE
Payer: MEDICARE

## 2022-11-15 DIAGNOSIS — E78.2 MIXED HYPERLIPIDEMIA: ICD-10-CM

## 2022-11-15 DIAGNOSIS — E11.9 DIABETES MELLITUS (HCC): Primary | ICD-10-CM

## 2022-11-15 LAB
ALBUMIN SERPL-MCNC: 3.5 G/DL (ref 3.4–5)
ALBUMIN/GLOB SERPL: 1.1 {RATIO} (ref 1–2)
ALP LIVER SERPL-CCNC: 86 U/L
ALT SERPL-CCNC: 20 U/L
ANION GAP SERPL CALC-SCNC: 5 MMOL/L (ref 0–18)
AST SERPL-CCNC: 12 U/L (ref 15–37)
BILIRUB SERPL-MCNC: 0.4 MG/DL (ref 0.1–2)
BUN BLD-MCNC: 17 MG/DL (ref 7–18)
BUN/CREAT SERPL: 17.7 (ref 10–20)
CALCIUM BLD-MCNC: 9.3 MG/DL (ref 8.5–10.1)
CHLORIDE SERPL-SCNC: 108 MMOL/L (ref 98–112)
CHOLEST SERPL-MCNC: 162 MG/DL (ref ?–200)
CO2 SERPL-SCNC: 27 MMOL/L (ref 21–32)
CREAT BLD-MCNC: 0.96 MG/DL
EST. AVERAGE GLUCOSE BLD GHB EST-MCNC: 166 MG/DL (ref 68–126)
FASTING PATIENT LIPID ANSWER: YES
FASTING STATUS PATIENT QL REPORTED: YES
GFR SERPLBLD BASED ON 1.73 SQ M-ARVRAT: 62 ML/MIN/1.73M2 (ref 60–?)
GLOBULIN PLAS-MCNC: 3.1 G/DL (ref 2.8–4.4)
GLUCOSE BLD-MCNC: 102 MG/DL (ref 70–99)
HBA1C MFR BLD: 7.4 % (ref ?–5.7)
HDLC SERPL-MCNC: 60 MG/DL (ref 40–59)
LDLC SERPL CALC-MCNC: 86 MG/DL (ref ?–100)
NONHDLC SERPL-MCNC: 102 MG/DL (ref ?–130)
OSMOLALITY SERPL CALC.SUM OF ELEC: 292 MOSM/KG (ref 275–295)
POTASSIUM SERPL-SCNC: 4.2 MMOL/L (ref 3.5–5.1)
PROT SERPL-MCNC: 6.6 G/DL (ref 6.4–8.2)
SODIUM SERPL-SCNC: 140 MMOL/L (ref 136–145)
TRIGL SERPL-MCNC: 89 MG/DL (ref 30–149)
VLDLC SERPL CALC-MCNC: 14 MG/DL (ref 0–30)

## 2022-11-15 PROCEDURE — 36415 COLL VENOUS BLD VENIPUNCTURE: CPT

## 2022-11-15 PROCEDURE — 83036 HEMOGLOBIN GLYCOSYLATED A1C: CPT

## 2022-11-15 PROCEDURE — 80061 LIPID PANEL: CPT

## 2022-11-15 PROCEDURE — 80053 COMPREHEN METABOLIC PANEL: CPT

## 2022-11-17 ENCOUNTER — EKG ENCOUNTER (OUTPATIENT)
Dept: LAB | Facility: HOSPITAL | Age: 72
End: 2022-11-17
Attending: FAMILY MEDICINE
Payer: MEDICARE

## 2022-11-17 ENCOUNTER — HOSPITAL ENCOUNTER (OUTPATIENT)
Dept: GENERAL RADIOLOGY | Facility: HOSPITAL | Age: 72
Discharge: HOME | End: 2022-11-17
Attending: FAMILY MEDICINE
Payer: MEDICARE

## 2022-11-17 DIAGNOSIS — Z01.818 PREOPERATIVE CLEARANCE: ICD-10-CM

## 2022-11-17 DIAGNOSIS — Z01.818 PRE-OP EXAM: Primary | ICD-10-CM

## 2022-11-17 LAB
ALBUMIN SERPL-MCNC: 3.6 G/DL (ref 3.4–5)
ALBUMIN/GLOB SERPL: 1 {RATIO} (ref 1–2)
ALP LIVER SERPL-CCNC: 88 U/L
ALT SERPL-CCNC: 19 U/L
ANION GAP SERPL CALC-SCNC: 5 MMOL/L (ref 0–18)
AST SERPL-CCNC: 15 U/L (ref 15–37)
ATRIAL RATE: 61 BPM
BASOPHILS # BLD AUTO: 0.03 X10(3) UL (ref 0–0.2)
BASOPHILS NFR BLD AUTO: 0.4 %
BILIRUB SERPL-MCNC: 0.5 MG/DL (ref 0.1–2)
BILIRUB UR QL: NEGATIVE
BUN BLD-MCNC: 15 MG/DL (ref 7–18)
BUN/CREAT SERPL: 16.5 (ref 10–20)
CALCIUM BLD-MCNC: 9.1 MG/DL (ref 8.5–10.1)
CHLORIDE SERPL-SCNC: 107 MMOL/L (ref 98–112)
CLARITY UR: CLEAR
CO2 SERPL-SCNC: 28 MMOL/L (ref 21–32)
COLOR UR: YELLOW
CREAT BLD-MCNC: 0.91 MG/DL
DEPRECATED RDW RBC AUTO: 44.4 FL (ref 35.1–46.3)
EOSINOPHIL # BLD AUTO: 0.42 X10(3) UL (ref 0–0.7)
EOSINOPHIL NFR BLD AUTO: 5.9 %
ERYTHROCYTE [DISTWIDTH] IN BLOOD BY AUTOMATED COUNT: 12.9 % (ref 11–15)
FASTING STATUS PATIENT QL REPORTED: NO
GFR SERPLBLD BASED ON 1.73 SQ M-ARVRAT: 66 ML/MIN/1.73M2 (ref 60–?)
GLOBULIN PLAS-MCNC: 3.6 G/DL (ref 2.8–4.4)
GLUCOSE BLD-MCNC: 92 MG/DL (ref 70–99)
GLUCOSE UR-MCNC: NEGATIVE MG/DL
HCT VFR BLD AUTO: 40.1 %
HGB BLD-MCNC: 12.7 G/DL
HGB UR QL STRIP.AUTO: NEGATIVE
IMM GRANULOCYTES # BLD AUTO: 0.02 X10(3) UL (ref 0–1)
IMM GRANULOCYTES NFR BLD: 0.3 %
KETONES UR-MCNC: NEGATIVE MG/DL
LYMPHOCYTES # BLD AUTO: 1.82 X10(3) UL (ref 1–4)
LYMPHOCYTES NFR BLD AUTO: 25.7 %
MCH RBC QN AUTO: 30 PG (ref 26–34)
MCHC RBC AUTO-ENTMCNC: 31.7 G/DL (ref 31–37)
MCV RBC AUTO: 94.6 FL
MONOCYTES # BLD AUTO: 0.44 X10(3) UL (ref 0.1–1)
MONOCYTES NFR BLD AUTO: 6.2 %
MRSA DNA SPEC QL NAA+PROBE: NEGATIVE
NEUTROPHILS # BLD AUTO: 4.34 X10 (3) UL (ref 1.5–7.7)
NEUTROPHILS # BLD AUTO: 4.34 X10(3) UL (ref 1.5–7.7)
NEUTROPHILS NFR BLD AUTO: 61.5 %
NITRITE UR QL STRIP.AUTO: NEGATIVE
OSMOLALITY SERPL CALC.SUM OF ELEC: 290 MOSM/KG (ref 275–295)
P AXIS: 29 DEGREES
P-R INTERVAL: 204 MS
PH UR: 6 [PH] (ref 5–8)
PLATELET # BLD AUTO: 205 10(3)UL (ref 150–450)
POTASSIUM SERPL-SCNC: 4 MMOL/L (ref 3.5–5.1)
PROT SERPL-MCNC: 7.2 G/DL (ref 6.4–8.2)
PROT UR-MCNC: NEGATIVE MG/DL
Q-T INTERVAL: 398 MS
QRS DURATION: 74 MS
QTC CALCULATION (BEZET): 400 MS
R AXIS: 44 DEGREES
RBC # BLD AUTO: 4.24 X10(6)UL
SODIUM SERPL-SCNC: 140 MMOL/L (ref 136–145)
SP GR UR STRIP: 1.01 (ref 1–1.03)
T AXIS: 57 DEGREES
UROBILINOGEN UR STRIP-ACNC: <2
VENTRICULAR RATE: 61 BPM
VIT C UR-MCNC: NEGATIVE MG/DL
WBC # BLD AUTO: 7.1 X10(3) UL (ref 4–11)

## 2022-11-17 PROCEDURE — 87641 MR-STAPH DNA AMP PROBE: CPT

## 2022-11-17 PROCEDURE — 93010 ELECTROCARDIOGRAM REPORT: CPT | Performed by: INTERNAL MEDICINE

## 2022-11-17 PROCEDURE — 71046 X-RAY EXAM CHEST 2 VIEWS: CPT | Performed by: FAMILY MEDICINE

## 2022-11-17 PROCEDURE — 80053 COMPREHEN METABOLIC PANEL: CPT

## 2022-11-17 PROCEDURE — 81001 URINALYSIS AUTO W/SCOPE: CPT

## 2022-11-17 PROCEDURE — 36415 COLL VENOUS BLD VENIPUNCTURE: CPT

## 2022-11-17 PROCEDURE — 85025 COMPLETE CBC W/AUTO DIFF WBC: CPT

## 2022-11-17 PROCEDURE — 93005 ELECTROCARDIOGRAM TRACING: CPT

## 2022-11-22 NOTE — TELEPHONE ENCOUNTER
Dr Osito Wing    I received an email from [place] with [facility]. Patient's MRI was rejected, he is asking for a CT Scan STAT - as surgery is scheduled for 12/8/22.   Please advise

## 2022-11-23 ENCOUNTER — HOSPITAL ENCOUNTER (OUTPATIENT)
Dept: CT IMAGING | Facility: HOSPITAL | Age: 72
Discharge: HOME | End: 2022-11-23
Attending: ORTHOPAEDIC SURGERY
Payer: MEDICARE

## 2022-11-23 DIAGNOSIS — M23.91 INTERNAL DERANGEMENT OF MULTIPLE SITES OF RIGHT KNEE: ICD-10-CM

## 2022-11-23 DIAGNOSIS — M17.11 PRIMARY OSTEOARTHRITIS OF RIGHT KNEE: ICD-10-CM

## 2022-11-23 PROCEDURE — 73700 CT LOWER EXTREMITY W/O DYE: CPT | Performed by: ORTHOPAEDIC SURGERY

## 2022-11-28 ENCOUNTER — TELEPHONE (OUTPATIENT)
Dept: ORTHOPEDICS CLINIC | Facility: CLINIC | Age: 72
End: 2022-11-28

## 2022-11-29 NOTE — TELEPHONE ENCOUNTER
Offered patient new surgery dates.  She chose 1/19/23 with the understanding that she must be cleared by her cardiologist.

## 2022-11-30 DIAGNOSIS — M17.11 PRIMARY OSTEOARTHRITIS OF RIGHT KNEE: Primary | ICD-10-CM

## 2022-12-30 NOTE — H&P
The above referenced H&P was reviewed by Chris Coleman MD on 1/5/2023, the patient was examined and no significant changes have occurred in the patient's condition since the H&P was performed. Risks and benefits were discussed, proceed with procedure as planned.

## 2023-01-03 ENCOUNTER — NURSE ONLY (OUTPATIENT)
Dept: LAB | Facility: HOSPITAL | Age: 73
End: 2023-01-03
Attending: INTERNAL MEDICINE
Payer: MEDICARE

## 2023-01-03 DIAGNOSIS — Z01.818 PRE-OP TESTING: ICD-10-CM

## 2023-01-03 LAB
ANION GAP SERPL CALC-SCNC: 4 MMOL/L (ref 0–18)
BUN BLD-MCNC: 19 MG/DL (ref 7–18)
BUN/CREAT SERPL: 18.4 (ref 10–20)
CALCIUM BLD-MCNC: 9 MG/DL (ref 8.5–10.1)
CHLORIDE SERPL-SCNC: 107 MMOL/L (ref 98–112)
CO2 SERPL-SCNC: 28 MMOL/L (ref 21–32)
CREAT BLD-MCNC: 1.03 MG/DL
DEPRECATED RDW RBC AUTO: 43 FL (ref 35.1–46.3)
ERYTHROCYTE [DISTWIDTH] IN BLOOD BY AUTOMATED COUNT: 13 % (ref 11–15)
FASTING STATUS PATIENT QL REPORTED: NO
GFR SERPLBLD BASED ON 1.73 SQ M-ARVRAT: 57 ML/MIN/1.73M2 (ref 60–?)
GLUCOSE BLD-MCNC: 270 MG/DL (ref 70–99)
HCT VFR BLD AUTO: 36.9 %
HGB BLD-MCNC: 12.3 G/DL
INR BLD: 0.97 (ref 0.85–1.16)
MCH RBC QN AUTO: 30.1 PG (ref 26–34)
MCHC RBC AUTO-ENTMCNC: 33.3 G/DL (ref 31–37)
MCV RBC AUTO: 90.4 FL
OSMOLALITY SERPL CALC.SUM OF ELEC: 300 MOSM/KG (ref 275–295)
PLATELET # BLD AUTO: 187 10(3)UL (ref 150–450)
POTASSIUM SERPL-SCNC: 3.9 MMOL/L (ref 3.5–5.1)
PROTHROMBIN TIME: 12.8 SECONDS (ref 11.6–14.8)
RBC # BLD AUTO: 4.08 X10(6)UL
SARS-COV-2 RNA RESP QL NAA+PROBE: NOT DETECTED
SODIUM SERPL-SCNC: 139 MMOL/L (ref 136–145)
WBC # BLD AUTO: 7.3 X10(3) UL (ref 4–11)

## 2023-01-03 PROCEDURE — 85610 PROTHROMBIN TIME: CPT

## 2023-01-03 PROCEDURE — 85027 COMPLETE CBC AUTOMATED: CPT

## 2023-01-03 PROCEDURE — 80048 BASIC METABOLIC PNL TOTAL CA: CPT

## 2023-01-03 PROCEDURE — 36415 COLL VENOUS BLD VENIPUNCTURE: CPT

## 2023-01-04 DIAGNOSIS — M17.11 PRIMARY OSTEOARTHRITIS OF RIGHT KNEE: Primary | ICD-10-CM

## 2023-01-05 ENCOUNTER — HOSPITAL ENCOUNTER (OUTPATIENT)
Dept: INTERVENTIONAL RADIOLOGY/VASCULAR | Facility: HOSPITAL | Age: 73
Discharge: HOME | End: 2023-01-05
Attending: INTERNAL MEDICINE | Admitting: INTERNAL MEDICINE
Payer: MEDICARE

## 2023-01-05 VITALS
HEIGHT: 66 IN | WEIGHT: 179 LBS | BODY MASS INDEX: 28.77 KG/M2 | OXYGEN SATURATION: 99 % | RESPIRATION RATE: 14 BRPM | HEART RATE: 61 BPM | DIASTOLIC BLOOD PRESSURE: 105 MMHG | TEMPERATURE: 97 F | SYSTOLIC BLOOD PRESSURE: 132 MMHG

## 2023-01-05 DIAGNOSIS — R94.39 ABNORMAL STRESS TEST: ICD-10-CM

## 2023-01-05 DIAGNOSIS — Z01.818 PRE-OP TESTING: Primary | ICD-10-CM

## 2023-01-05 LAB — GLUCOSE BLDC GLUCOMTR-MCNC: 228 MG/DL (ref 70–99)

## 2023-01-05 PROCEDURE — 027035Z DILATION OF CORONARY ARTERY, ONE ARTERY WITH TWO DRUG-ELUTING INTRALUMINAL DEVICES, PERCUTANEOUS APPROACH: ICD-10-PCS | Performed by: INTERNAL MEDICINE

## 2023-01-05 PROCEDURE — B2111ZZ FLUOROSCOPY OF MULTIPLE CORONARY ARTERIES USING LOW OSMOLAR CONTRAST: ICD-10-PCS | Performed by: INTERNAL MEDICINE

## 2023-01-05 PROCEDURE — 99152 MOD SED SAME PHYS/QHP 5/>YRS: CPT

## 2023-01-05 PROCEDURE — 93458 L HRT ARTERY/VENTRICLE ANGIO: CPT

## 2023-01-05 PROCEDURE — 82962 GLUCOSE BLOOD TEST: CPT

## 2023-01-05 PROCEDURE — 4A023N7 MEASUREMENT OF CARDIAC SAMPLING AND PRESSURE, LEFT HEART, PERCUTANEOUS APPROACH: ICD-10-PCS | Performed by: INTERNAL MEDICINE

## 2023-01-05 PROCEDURE — 99153 MOD SED SAME PHYS/QHP EA: CPT

## 2023-01-05 RX ORDER — MIDAZOLAM HYDROCHLORIDE 1 MG/ML
INJECTION INTRAMUSCULAR; INTRAVENOUS
Status: COMPLETED
Start: 2023-01-05 | End: 2023-01-05

## 2023-01-05 RX ORDER — SODIUM CHLORIDE 9 MG/ML
INJECTION, SOLUTION INTRAVENOUS
Status: DISCONTINUED | OUTPATIENT
Start: 2023-01-05 | End: 2023-01-05

## 2023-01-05 RX ORDER — SODIUM CHLORIDE 9 MG/ML
1 INJECTION, SOLUTION INTRAVENOUS ONCE
Status: COMPLETED | OUTPATIENT
Start: 2023-01-05 | End: 2023-01-05

## 2023-01-05 RX ORDER — HEPARIN SODIUM 1000 [USP'U]/ML
INJECTION, SOLUTION INTRAVENOUS; SUBCUTANEOUS
Status: COMPLETED
Start: 2023-01-05 | End: 2023-01-05

## 2023-01-05 RX ORDER — SODIUM CHLORIDE 9 MG/ML
INJECTION, SOLUTION INTRAVENOUS CONTINUOUS
Status: DISCONTINUED | OUTPATIENT
Start: 2023-01-05 | End: 2023-01-05

## 2023-01-05 RX ORDER — ASPIRIN 81 MG/1
81 TABLET ORAL DAILY
Status: DISCONTINUED | OUTPATIENT
Start: 2023-01-06 | End: 2023-01-05

## 2023-01-05 RX ORDER — LIDOCAINE HYDROCHLORIDE 20 MG/ML
INJECTION, SOLUTION EPIDURAL; INFILTRATION; INTRACAUDAL; PERINEURAL
Status: COMPLETED
Start: 2023-01-05 | End: 2023-01-05

## 2023-01-05 RX ORDER — NITROGLYCERIN 20 MG/100ML
INJECTION INTRAVENOUS
Status: DISCONTINUED
Start: 2023-01-05 | End: 2023-01-05 | Stop reason: WASHOUT

## 2023-01-05 RX ORDER — ATORVASTATIN CALCIUM 20 MG/1
20 TABLET, FILM COATED ORAL NIGHTLY
Qty: 30 TABLET | Refills: 5 | Status: SHIPPED | OUTPATIENT
Start: 2023-01-05

## 2023-01-05 RX ORDER — ASPIRIN 81 MG/1
81 TABLET ORAL DAILY
Qty: 30 TABLET | Refills: 5 | Status: SHIPPED | OUTPATIENT
Start: 2023-01-05

## 2023-01-05 RX ORDER — VERAPAMIL HYDROCHLORIDE 2.5 MG/ML
INJECTION, SOLUTION INTRAVENOUS
Status: DISCONTINUED
Start: 2023-01-05 | End: 2023-01-05 | Stop reason: WASHOUT

## 2023-01-05 RX ADMIN — SODIUM CHLORIDE 1 ML/KG/HR: 9 INJECTION, SOLUTION INTRAVENOUS at 10:15:00

## 2023-01-05 NOTE — IVS NOTE
DISCHARGE NOTE    1, PATIENT AWAKE AND ALERT X 4    2. RIVERA, VSS, NO PONV, NO PAIN    3. INSTRUCTIONS GIVEN TO PATIENT AND FAMILY    4. IV ACCESS WAS REMOVED BEFORE DISCHARGE    5. DR. Lawrence        SPOKE WITH PATIENT AND FAMILY POST PROCEDURE    6. PATIENT ABLE TO eat, DRINK, AMBULATE, VOID    7. PROCEDURAL SITE REMAINS DRY, INTACT WITH GOOD CIRCULATION,    MOVEMENT AND SENSATION    8. FOLLOW UP APPOINTMENT WITH Vee Fisher  9. PATIENT DISCHARGED VIA WHEEL CHAIR TO     10. NEW PRESCRIPTION: [facility] in [place] called , they are out of [facility] at this time, will be getting shipment in tomorrow, [facility] was called they brought over several days worth of samples for the patient.

## 2023-01-05 NOTE — DIETARY NOTE
NUTRITION EDUCATION NOTE     Received consult for cardiac nutrition education. Verbally reviewed basic cardiac diet restrictions. Provided with Eating Heart-Healthy and NCM handout to reinforce. Pt's dtr present for RD visit. Pt very receptive to instruction. Would benefit from outpt f/u. Expect good compliance.         Malathi Ricks RD, LDN  Clinical Dietitian  P: [phone]

## 2023-01-05 NOTE — CARDIAC REHAB
Cardiac Rehab Phase I    Activity:  Distance   Assistance needed   Patient tolerated activity . Education:  Handouts provided and reviewed: [address] Diet: Healthy Cardiac diet reviewed. Disease Process: Disease process reviewed.     Reviewed the following:       RISK FACTORS: Reviewed      SMOKING CESSATION: Reviewed      HOME EXERCISE ACTIVITY: Reviewed      OUTPATIENT CARDIAC REHAB: Referred to Cardiac Rehabilitation

## 2023-01-11 ENCOUNTER — TELEPHONE (OUTPATIENT)
Dept: ORTHOPEDICS CLINIC | Facility: CLINIC | Age: 73
End: 2023-01-11

## 2023-01-13 ENCOUNTER — LAB ENCOUNTER (OUTPATIENT)
Dept: LAB | Facility: HOSPITAL | Age: 73
End: 2023-01-13
Attending: NURSE PRACTITIONER
Payer: MEDICARE

## 2023-01-13 DIAGNOSIS — R94.39 ABNORMAL NUCLEAR STRESS TEST: Primary | ICD-10-CM

## 2023-01-13 DIAGNOSIS — Z01.818 ENCOUNTER FOR PRE-OPERATIVE EXAMINATION: ICD-10-CM

## 2023-01-13 LAB
ALBUMIN SERPL-MCNC: 3.3 G/DL (ref 3.4–5)
ALP LIVER SERPL-CCNC: 106 U/L
ALT SERPL-CCNC: 20 U/L
AST SERPL-CCNC: 12 U/L (ref 15–37)
BILIRUB DIRECT SERPL-MCNC: 0.1 MG/DL (ref 0–0.2)
BILIRUB SERPL-MCNC: 0.4 MG/DL (ref 0.1–2)
CHOLEST SERPL-MCNC: 143 MG/DL (ref ?–200)
FASTING PATIENT LIPID ANSWER: YES
HDLC SERPL-MCNC: 61 MG/DL (ref 40–59)
LDLC SERPL CALC-MCNC: 64 MG/DL (ref ?–100)
NONHDLC SERPL-MCNC: 82 MG/DL (ref ?–130)
PROT SERPL-MCNC: 6.9 G/DL (ref 6.4–8.2)
TRIGL SERPL-MCNC: 100 MG/DL (ref 30–149)
VLDLC SERPL CALC-MCNC: 15 MG/DL (ref 0–30)

## 2023-01-13 PROCEDURE — 80061 LIPID PANEL: CPT

## 2023-01-13 PROCEDURE — 80076 HEPATIC FUNCTION PANEL: CPT

## 2023-01-13 PROCEDURE — 36415 COLL VENOUS BLD VENIPUNCTURE: CPT

## 2023-01-16 NOTE — TELEPHONE ENCOUNTER
Called Nina back, she states that she is doing well after her procedure. She does have an appointment with Cardiologist on March 21st, for a follow up and clear her for surgery. With talking with patient, I told her that we could request surgery dates for Early April, this was she has plenty of time in between clearances and surgery. Phyllistine Hodgkins agrees with this plan and has no further questions at this time. Sent an Email to Booker for surgery dates in April.

## 2023-01-31 ENCOUNTER — OFFICE VISIT (OUTPATIENT)
Dept: OTOLARYNGOLOGY | Facility: CLINIC | Age: 73
End: 2023-01-31

## 2023-01-31 DIAGNOSIS — J30.9 ALLERGIC RHINITIS, UNSPECIFIED SEASONALITY, UNSPECIFIED TRIGGER: Primary | ICD-10-CM

## 2023-01-31 DIAGNOSIS — H61.22 IMPACTED CERUMEN OF LEFT EAR: ICD-10-CM

## 2023-01-31 PROCEDURE — 99213 OFFICE O/P EST LOW 20 MIN: CPT | Performed by: OTOLARYNGOLOGY

## 2023-01-31 RX ORDER — MONTELUKAST SODIUM 10 MG/1
10 TABLET ORAL NIGHTLY
Qty: 30 TABLET | Refills: 3 | Status: SHIPPED | OUTPATIENT
Start: 2023-01-31

## 2023-01-31 RX ORDER — PSEUDOEPHEDRINE HCL 30 MG
1 TABLET ORAL DAILY
COMMUNITY
Start: 2022-12-20

## 2023-01-31 RX ORDER — ASPIRIN 81 MG/1
1 TABLET, CHEWABLE ORAL DAILY
COMMUNITY
Start: 2022-12-20

## 2023-04-04 ENCOUNTER — TELEPHONE (OUTPATIENT)
Dept: ORTHOPEDICS CLINIC | Facility: CLINIC | Age: 73
End: 2023-04-04

## 2023-04-05 NOTE — TELEPHONE ENCOUNTER
Subhash Barnes received a call from pt stating a cardiac clearance is needed. Per Tova Barnes asking to reach out to Dr. Jose Miguel Mcknight in cardiology. Per Tova Barnes if anything else is needed please call or send a fax.     [phone]

## 2023-04-06 NOTE — TELEPHONE ENCOUNTER
Called Dr. Patrizia Baxter and Dr. Wright Green[place]  office for cardiac and medical clearance to be faxed over. Eye Protection Verbiage: Before proceeding with the stage, a plastic scleral shield was inserted. The globe was anesthetized with a few drops of 1% lidocaine with 1:100,000 epinephrine. Then, an appropriate sized scleral shield was chosen and coated with lacrilube ointment. The shield was gently inserted and left in place for the duration of each stage. After the stage was completed, the shield was gently removed.

## 2023-04-06 NOTE — TELEPHONE ENCOUNTER
Spoke with Dr. Taina Dick office. They were not aware that patient needed medical clearance again. I faxed over our medical clearance form. They will call her to come in and complete the lab work to get her medically cleared for surgery.

## 2023-04-07 ENCOUNTER — LAB ENCOUNTER (OUTPATIENT)
Dept: LAB | Facility: HOSPITAL | Age: 73
End: 2023-04-07
Attending: FAMILY MEDICINE
Payer: MEDICARE

## 2023-04-07 DIAGNOSIS — Z01.818 PRE-OP EXAM: Primary | ICD-10-CM

## 2023-04-07 LAB
ALBUMIN SERPL-MCNC: 3.8 G/DL (ref 3.4–5)
ALBUMIN/GLOB SERPL: 1.1 {RATIO} (ref 1–2)
ALP LIVER SERPL-CCNC: 109 U/L
ALT SERPL-CCNC: 19 U/L
ANION GAP SERPL CALC-SCNC: 7 MMOL/L (ref 0–18)
AST SERPL-CCNC: 15 U/L (ref 15–37)
BASOPHILS # BLD AUTO: 0.04 X10(3) UL (ref 0–0.2)
BASOPHILS NFR BLD AUTO: 0.6 %
BILIRUB SERPL-MCNC: 0.4 MG/DL (ref 0.1–2)
BILIRUB UR QL: NEGATIVE
BUN BLD-MCNC: 19 MG/DL (ref 7–18)
BUN/CREAT SERPL: 18.8 (ref 10–20)
CALCIUM BLD-MCNC: 9.1 MG/DL (ref 8.5–10.1)
CHLORIDE SERPL-SCNC: 111 MMOL/L (ref 98–112)
CLARITY UR: CLEAR
CO2 SERPL-SCNC: 25 MMOL/L (ref 21–32)
COLOR UR: YELLOW
CREAT BLD-MCNC: 1.01 MG/DL
DEPRECATED RDW RBC AUTO: 41.6 FL (ref 35.1–46.3)
EOSINOPHIL # BLD AUTO: 0.3 X10(3) UL (ref 0–0.7)
EOSINOPHIL NFR BLD AUTO: 4.2 %
ERYTHROCYTE [DISTWIDTH] IN BLOOD BY AUTOMATED COUNT: 12.5 % (ref 11–15)
FASTING STATUS PATIENT QL REPORTED: YES
GFR SERPLBLD BASED ON 1.73 SQ M-ARVRAT: 58 ML/MIN/1.73M2 (ref 60–?)
GLOBULIN PLAS-MCNC: 3.4 G/DL (ref 2.8–4.4)
GLUCOSE BLD-MCNC: 204 MG/DL (ref 70–99)
GLUCOSE UR-MCNC: 100 MG/DL
HCT VFR BLD AUTO: 39 %
HGB BLD-MCNC: 12.6 G/DL
HGB UR QL STRIP.AUTO: NEGATIVE
IMM GRANULOCYTES # BLD AUTO: 0.02 X10(3) UL (ref 0–1)
IMM GRANULOCYTES NFR BLD: 0.3 %
KETONES UR-MCNC: NEGATIVE MG/DL
LEUKOCYTE ESTERASE UR QL STRIP.AUTO: NEGATIVE
LYMPHOCYTES # BLD AUTO: 2.29 X10(3) UL (ref 1–4)
LYMPHOCYTES NFR BLD AUTO: 32.4 %
MCH RBC QN AUTO: 29.6 PG (ref 26–34)
MCHC RBC AUTO-ENTMCNC: 32.3 G/DL (ref 31–37)
MCV RBC AUTO: 91.5 FL
MONOCYTES # BLD AUTO: 0.49 X10(3) UL (ref 0.1–1)
MONOCYTES NFR BLD AUTO: 6.9 %
MRSA DNA SPEC QL NAA+PROBE: NEGATIVE
NEUTROPHILS # BLD AUTO: 3.92 X10 (3) UL (ref 1.5–7.7)
NEUTROPHILS # BLD AUTO: 3.92 X10(3) UL (ref 1.5–7.7)
NEUTROPHILS NFR BLD AUTO: 55.6 %
NITRITE UR QL STRIP.AUTO: NEGATIVE
OSMOLALITY SERPL CALC.SUM OF ELEC: 304 MOSM/KG (ref 275–295)
PH UR: 5.5 [PH] (ref 5–8)
PLATELET # BLD AUTO: 191 10(3)UL (ref 150–450)
POTASSIUM SERPL-SCNC: 4 MMOL/L (ref 3.5–5.1)
PROT SERPL-MCNC: 7.2 G/DL (ref 6.4–8.2)
PROT UR-MCNC: 20 MG/DL
RBC # BLD AUTO: 4.26 X10(6)UL
SODIUM SERPL-SCNC: 143 MMOL/L (ref 136–145)
SP GR UR STRIP: 1.02 (ref 1–1.03)
UROBILINOGEN UR STRIP-ACNC: NORMAL
WBC # BLD AUTO: 7.1 X10(3) UL (ref 4–11)

## 2023-04-07 PROCEDURE — 85025 COMPLETE CBC W/AUTO DIFF WBC: CPT

## 2023-04-07 PROCEDURE — 87641 MR-STAPH DNA AMP PROBE: CPT

## 2023-04-07 PROCEDURE — 80053 COMPREHEN METABOLIC PANEL: CPT

## 2023-04-07 PROCEDURE — 36415 COLL VENOUS BLD VENIPUNCTURE: CPT

## 2023-04-07 PROCEDURE — 81001 URINALYSIS AUTO W/SCOPE: CPT

## 2023-04-12 ENCOUNTER — LAB ENCOUNTER (OUTPATIENT)
Dept: LAB | Facility: HOSPITAL | Age: 73
End: 2023-04-12
Attending: ORTHOPAEDIC SURGERY
Payer: MEDICARE

## 2023-04-12 DIAGNOSIS — Z01.818 PREOP TESTING: ICD-10-CM

## 2023-04-12 LAB
ANTIBODY SCREEN: NEGATIVE
RH BLOOD TYPE: POSITIVE
RH BLOOD TYPE: POSITIVE

## 2023-04-12 PROCEDURE — 86900 BLOOD TYPING SEROLOGIC ABO: CPT

## 2023-04-12 PROCEDURE — 36415 COLL VENOUS BLD VENIPUNCTURE: CPT

## 2023-04-12 PROCEDURE — 86901 BLOOD TYPING SEROLOGIC RH(D): CPT

## 2023-04-12 PROCEDURE — 86850 RBC ANTIBODY SCREEN: CPT

## 2023-04-13 ENCOUNTER — TELEPHONE (OUTPATIENT)
Dept: ORTHOPEDICS CLINIC | Facility: CLINIC | Age: 73
End: 2023-04-13

## 2023-04-13 ENCOUNTER — HOSPITAL ENCOUNTER (OUTPATIENT)
Facility: HOSPITAL | Age: 73
Discharge: HOME | End: 2023-04-13
Attending: ORTHOPAEDIC SURGERY | Admitting: ORTHOPAEDIC SURGERY
Payer: MEDICARE

## 2023-04-13 ENCOUNTER — ANESTHESIA EVENT (OUTPATIENT)
Dept: SURGERY | Facility: HOSPITAL | Age: 73
End: 2023-04-13
Payer: MEDICARE

## 2023-04-13 ENCOUNTER — ANESTHESIA (OUTPATIENT)
Dept: SURGERY | Facility: HOSPITAL | Age: 73
End: 2023-04-13
Payer: MEDICARE

## 2023-04-13 VITALS
OXYGEN SATURATION: 99 % | TEMPERATURE: 99 F | RESPIRATION RATE: 20 BRPM | DIASTOLIC BLOOD PRESSURE: 59 MMHG | SYSTOLIC BLOOD PRESSURE: 141 MMHG | HEART RATE: 63 BPM | HEIGHT: 66 IN | WEIGHT: 182 LBS | BODY MASS INDEX: 29.25 KG/M2

## 2023-04-13 DIAGNOSIS — Z01.818 PREOP TESTING: Primary | ICD-10-CM

## 2023-04-13 LAB — GLUCOSE BLDC GLUCOMTR-MCNC: 172 MG/DL (ref 70–99)

## 2023-04-13 PROCEDURE — 82962 GLUCOSE BLOOD TEST: CPT

## 2023-04-13 RX ORDER — VANCOMYCIN HYDROCHLORIDE
15 ONCE
Status: DISCONTINUED | OUTPATIENT
Start: 2023-04-13 | End: 2023-04-13

## 2023-04-13 RX ORDER — ACETAMINOPHEN 500 MG
1000 TABLET ORAL ONCE
Status: COMPLETED | OUTPATIENT
Start: 2023-04-13 | End: 2023-04-13

## 2023-04-13 RX ORDER — NICOTINE POLACRILEX 4 MG
30 LOZENGE BUCCAL
Status: DISCONTINUED | OUTPATIENT
Start: 2023-04-13 | End: 2023-04-13

## 2023-04-13 RX ORDER — SODIUM CHLORIDE, SODIUM LACTATE, POTASSIUM CHLORIDE, CALCIUM CHLORIDE 600; 310; 30; 20 MG/100ML; MG/100ML; MG/100ML; MG/100ML
INJECTION, SOLUTION INTRAVENOUS CONTINUOUS
Status: DISCONTINUED | OUTPATIENT
Start: 2023-04-13 | End: 2023-04-13

## 2023-04-13 RX ORDER — DEXTROSE MONOHYDRATE 25 G/50ML
50 INJECTION, SOLUTION INTRAVENOUS
Status: DISCONTINUED | OUTPATIENT
Start: 2023-04-13 | End: 2023-04-13

## 2023-04-13 RX ORDER — NICOTINE POLACRILEX 4 MG
15 LOZENGE BUCCAL
Status: DISCONTINUED | OUTPATIENT
Start: 2023-04-13 | End: 2023-04-13

## 2023-04-13 NOTE — TELEPHONE ENCOUNTER
Patient calling states she was to have surgery and it was cancelled and would like to know how to proceed. States she missed a call and believes it was regarding this.   Please advise

## 2023-04-13 NOTE — TELEPHONE ENCOUNTER
Patient calling back to speak to [facility] again. Do not see another TE at this time.  Please advise

## 2023-04-20 ENCOUNTER — HOSPITAL ENCOUNTER (INPATIENT)
Facility: HOSPITAL | Age: 73
LOS: 1 days | Discharge: HOME HEALTH | End: 2023-04-21
Attending: ORTHOPAEDIC SURGERY | Admitting: ORTHOPAEDIC SURGERY
Payer: MEDICARE

## 2023-04-20 ENCOUNTER — APPOINTMENT (OUTPATIENT)
Dept: GENERAL RADIOLOGY | Facility: HOSPITAL | Age: 73
End: 2023-04-20
Attending: ORTHOPAEDIC SURGERY
Payer: MEDICARE

## 2023-04-20 ENCOUNTER — HOSPITAL ENCOUNTER (OUTPATIENT)
Facility: HOSPITAL | Age: 73
Discharge: HOME HEALTH | End: 2023-04-21
Attending: ORTHOPAEDIC SURGERY | Admitting: ORTHOPAEDIC SURGERY
Payer: MEDICARE

## 2023-04-20 ENCOUNTER — ANESTHESIA EVENT (OUTPATIENT)
Dept: SURGERY | Facility: HOSPITAL | Age: 73
End: 2023-04-20
Payer: MEDICARE

## 2023-04-20 ENCOUNTER — ANESTHESIA (OUTPATIENT)
Dept: SURGERY | Facility: HOSPITAL | Age: 73
End: 2023-04-20
Payer: MEDICARE

## 2023-04-20 DIAGNOSIS — M23.91 INTERNAL DERANGEMENT OF MULTIPLE SITES OF RIGHT KNEE: ICD-10-CM

## 2023-04-20 DIAGNOSIS — M17.11 PRIMARY OSTEOARTHRITIS OF RIGHT KNEE: ICD-10-CM

## 2023-04-20 PROBLEM — I10 ESSENTIAL HYPERTENSION: Chronic | Status: ACTIVE | Noted: 2023-04-20

## 2023-04-20 PROBLEM — E11.618 TYPE 2 DIABETES MELLITUS WITH DIABETIC ARTHROPATHY (HCC): Status: ACTIVE | Noted: 2023-04-20

## 2023-04-20 PROBLEM — I25.10 ATHEROSCLEROSIS OF CORONARY ARTERY OF NATIVE HEART WITHOUT ANGINA PECTORIS: Status: ACTIVE | Noted: 2023-04-20

## 2023-04-20 PROBLEM — E78.5 HYPERLIPIDEMIA: Chronic | Status: ACTIVE | Noted: 2023-04-20

## 2023-04-20 LAB
GLUCOSE BLDC GLUCOMTR-MCNC: 153 MG/DL (ref 70–99)
GLUCOSE BLDC GLUCOMTR-MCNC: 196 MG/DL (ref 70–99)
GLUCOSE BLDC GLUCOMTR-MCNC: 225 MG/DL (ref 70–99)
GLUCOSE BLDC GLUCOMTR-MCNC: 281 MG/DL (ref 70–99)

## 2023-04-20 PROCEDURE — 0SRC0J9 REPLACEMENT OF RIGHT KNEE JOINT WITH SYNTHETIC SUBSTITUTE, CEMENTED, OPEN APPROACH: ICD-10-PCS | Performed by: ORTHOPAEDIC SURGERY

## 2023-04-20 PROCEDURE — 73560 X-RAY EXAM OF KNEE 1 OR 2: CPT | Performed by: ORTHOPAEDIC SURGERY

## 2023-04-20 PROCEDURE — 99232 SBSQ HOSP IP/OBS MODERATE 35: CPT | Performed by: HOSPITALIST

## 2023-04-20 DEVICE — IMPLANTABLE DEVICE
Type: IMPLANTABLE DEVICE | Site: KNEE | Status: FUNCTIONAL
Brand: REFOBACIN® BONE CEMENT R

## 2023-04-20 DEVICE — IMPLANTABLE DEVICE
Type: IMPLANTABLE DEVICE | Site: KNEE | Status: FUNCTIONAL
Brand: PERSONA® NATURAL TIBIA®

## 2023-04-20 DEVICE — IMPLANTABLE DEVICE
Type: IMPLANTABLE DEVICE | Site: KNEE | Status: FUNCTIONAL
Brand: PERSONA®

## 2023-04-20 DEVICE — IMPLANTABLE DEVICE
Type: IMPLANTABLE DEVICE | Site: KNEE | Status: FUNCTIONAL
Brand: PERSONA® VIVACIT-E®

## 2023-04-20 RX ORDER — DIPHENHYDRAMINE HYDROCHLORIDE 50 MG/ML
12.5 INJECTION INTRAMUSCULAR; INTRAVENOUS EVERY 4 HOURS PRN
Status: DISCONTINUED | OUTPATIENT
Start: 2023-04-20 | End: 2023-04-21

## 2023-04-20 RX ORDER — PREGABALIN 75 MG/1
300 CAPSULE ORAL NIGHTLY
Status: DISCONTINUED | OUTPATIENT
Start: 2023-04-20 | End: 2023-04-21

## 2023-04-20 RX ORDER — ASPIRIN 81 MG/1
81 TABLET ORAL 2 TIMES DAILY WITH MEALS
Qty: 60 TABLET | Refills: 0 | Status: SHIPPED | OUTPATIENT
Start: 2023-04-20

## 2023-04-20 RX ORDER — BISACODYL 10 MG
10 SUPPOSITORY, RECTAL RECTAL
Status: DISCONTINUED | OUTPATIENT
Start: 2023-04-20 | End: 2023-04-21

## 2023-04-20 RX ORDER — EPHEDRINE SULFATE 50 MG/ML
INJECTION INTRAVENOUS AS NEEDED
Status: DISCONTINUED | OUTPATIENT
Start: 2023-04-20 | End: 2023-04-20 | Stop reason: SURG

## 2023-04-20 RX ORDER — ASPIRIN 81 MG/1
81 TABLET ORAL 2 TIMES DAILY
Status: DISCONTINUED | OUTPATIENT
Start: 2023-04-20 | End: 2023-04-21

## 2023-04-20 RX ORDER — NICOTINE POLACRILEX 4 MG
30 LOZENGE BUCCAL
Status: DISCONTINUED | OUTPATIENT
Start: 2023-04-20 | End: 2023-04-20 | Stop reason: HOSPADM

## 2023-04-20 RX ORDER — SENNOSIDES 8.6 MG
17.2 TABLET ORAL NIGHTLY
Status: DISCONTINUED | OUTPATIENT
Start: 2023-04-20 | End: 2023-04-21

## 2023-04-20 RX ORDER — LOSARTAN POTASSIUM 25 MG/1
25 TABLET ORAL DAILY
Status: DISCONTINUED | OUTPATIENT
Start: 2023-04-20 | End: 2023-04-21

## 2023-04-20 RX ORDER — PROCHLORPERAZINE EDISYLATE 5 MG/ML
5 INJECTION INTRAMUSCULAR; INTRAVENOUS ONCE AS NEEDED
Status: ACTIVE | OUTPATIENT
Start: 2023-04-20 | End: 2023-04-20

## 2023-04-20 RX ORDER — IBUPROFEN 200 MG
400 TABLET ORAL 3 TIMES DAILY
Status: DISCONTINUED | OUTPATIENT
Start: 2023-04-20 | End: 2023-04-21

## 2023-04-20 RX ORDER — NALOXONE HYDROCHLORIDE 0.4 MG/ML
0.08 INJECTION, SOLUTION INTRAMUSCULAR; INTRAVENOUS; SUBCUTANEOUS
Status: DISCONTINUED | OUTPATIENT
Start: 2023-04-20 | End: 2023-04-21

## 2023-04-20 RX ORDER — NALBUPHINE HYDROCHLORIDE 10 MG/ML
2.5 INJECTION, SOLUTION INTRAMUSCULAR; INTRAVENOUS; SUBCUTANEOUS EVERY 4 HOURS PRN
Status: DISCONTINUED | OUTPATIENT
Start: 2023-04-20 | End: 2023-04-21

## 2023-04-20 RX ORDER — ONDANSETRON 2 MG/ML
4 INJECTION INTRAMUSCULAR; INTRAVENOUS ONCE AS NEEDED
Status: ACTIVE | OUTPATIENT
Start: 2023-04-20 | End: 2023-04-20

## 2023-04-20 RX ORDER — NICOTINE POLACRILEX 4 MG
15 LOZENGE BUCCAL
Status: DISCONTINUED | OUTPATIENT
Start: 2023-04-20 | End: 2023-04-20 | Stop reason: HOSPADM

## 2023-04-20 RX ORDER — LIDOCAINE HYDROCHLORIDE 10 MG/ML
INJECTION, SOLUTION INFILTRATION; PERINEURAL
Status: COMPLETED | OUTPATIENT
Start: 2023-04-20 | End: 2023-04-20

## 2023-04-20 RX ORDER — HYDROCODONE BITARTRATE AND ACETAMINOPHEN 7.5; 325 MG/1; MG/1
2 TABLET ORAL EVERY 6 HOURS PRN
Status: DISCONTINUED | OUTPATIENT
Start: 2023-04-20 | End: 2023-04-21

## 2023-04-20 RX ORDER — DIPHENHYDRAMINE HYDROCHLORIDE 50 MG/ML
25 INJECTION INTRAMUSCULAR; INTRAVENOUS ONCE AS NEEDED
Status: ACTIVE | OUTPATIENT
Start: 2023-04-20 | End: 2023-04-20

## 2023-04-20 RX ORDER — GLIPIZIDE 5 MG/1
5 TABLET ORAL
Status: DISCONTINUED | OUTPATIENT
Start: 2023-04-20 | End: 2023-04-20

## 2023-04-20 RX ORDER — BUPIVACAINE HYDROCHLORIDE 7.5 MG/ML
INJECTION, SOLUTION INTRASPINAL
Status: COMPLETED | OUTPATIENT
Start: 2023-04-20 | End: 2023-04-20

## 2023-04-20 RX ORDER — ACETAMINOPHEN 325 MG/1
650 TABLET ORAL EVERY 8 HOURS PRN
Qty: 40 TABLET | Refills: 0 | Status: SHIPPED | OUTPATIENT
Start: 2023-04-20 | End: 2023-05-09

## 2023-04-20 RX ORDER — DIPHENHYDRAMINE HCL 25 MG
25 CAPSULE ORAL EVERY 4 HOURS PRN
Status: DISCONTINUED | OUTPATIENT
Start: 2023-04-20 | End: 2023-04-21

## 2023-04-20 RX ORDER — POLYETHYLENE GLYCOL 3350 17 G/17G
17 POWDER, FOR SOLUTION ORAL DAILY PRN
Status: DISCONTINUED | OUTPATIENT
Start: 2023-04-20 | End: 2023-04-21

## 2023-04-20 RX ORDER — ACETAMINOPHEN 325 MG/1
650 TABLET ORAL EVERY 8 HOURS PRN
Status: DISCONTINUED | OUTPATIENT
Start: 2023-04-20 | End: 2023-04-21

## 2023-04-20 RX ORDER — VANCOMYCIN HYDROCHLORIDE
15 ONCE
Status: COMPLETED | OUTPATIENT
Start: 2023-04-20 | End: 2023-04-20

## 2023-04-20 RX ORDER — ONDANSETRON 2 MG/ML
INJECTION INTRAMUSCULAR; INTRAVENOUS AS NEEDED
Status: DISCONTINUED | OUTPATIENT
Start: 2023-04-20 | End: 2023-04-20 | Stop reason: SURG

## 2023-04-20 RX ORDER — DEXTROSE MONOHYDRATE 25 G/50ML
50 INJECTION, SOLUTION INTRAVENOUS
Status: DISCONTINUED | OUTPATIENT
Start: 2023-04-20 | End: 2023-04-21

## 2023-04-20 RX ORDER — CALCIUM CARBONATE-CHOLECALCIFEROL TAB 250 MG-125 UNIT 250-125 MG-UNIT
1 TAB ORAL 2 TIMES DAILY WITH MEALS
Status: DISCONTINUED | OUTPATIENT
Start: 2023-04-20 | End: 2023-04-21

## 2023-04-20 RX ORDER — ENEMA 19; 7 G/133ML; G/133ML
1 ENEMA RECTAL ONCE AS NEEDED
Status: DISCONTINUED | OUTPATIENT
Start: 2023-04-20 | End: 2023-04-21

## 2023-04-20 RX ORDER — TRANEXAMIC ACID 10 MG/ML
INJECTION, SOLUTION INTRAVENOUS AS NEEDED
Status: DISCONTINUED | OUTPATIENT
Start: 2023-04-20 | End: 2023-04-20 | Stop reason: SURG

## 2023-04-20 RX ORDER — SODIUM CHLORIDE, SODIUM LACTATE, POTASSIUM CHLORIDE, CALCIUM CHLORIDE 600; 310; 30; 20 MG/100ML; MG/100ML; MG/100ML; MG/100ML
INJECTION, SOLUTION INTRAVENOUS CONTINUOUS
Status: DISCONTINUED | OUTPATIENT
Start: 2023-04-20 | End: 2023-04-20

## 2023-04-20 RX ORDER — IBUPROFEN 400 MG/1
400 TABLET ORAL 3 TIMES DAILY
Qty: 40 TABLET | Refills: 0 | Status: SHIPPED | OUTPATIENT
Start: 2023-04-20 | End: 2023-05-01

## 2023-04-20 RX ORDER — NICOTINE POLACRILEX 4 MG
15 LOZENGE BUCCAL
Status: DISCONTINUED | OUTPATIENT
Start: 2023-04-20 | End: 2023-04-21

## 2023-04-20 RX ORDER — ATORVASTATIN CALCIUM 20 MG/1
20 TABLET, FILM COATED ORAL NIGHTLY
Status: DISCONTINUED | OUTPATIENT
Start: 2023-04-20 | End: 2023-04-21

## 2023-04-20 RX ORDER — DEXTROSE MONOHYDRATE 25 G/50ML
50 INJECTION, SOLUTION INTRAVENOUS
Status: DISCONTINUED | OUTPATIENT
Start: 2023-04-20 | End: 2023-04-20 | Stop reason: HOSPADM

## 2023-04-20 RX ORDER — METOCLOPRAMIDE HYDROCHLORIDE 5 MG/ML
10 INJECTION INTRAMUSCULAR; INTRAVENOUS EVERY 8 HOURS PRN
Status: DISCONTINUED | OUTPATIENT
Start: 2023-04-20 | End: 2023-04-21

## 2023-04-20 RX ORDER — HYDROCODONE BITARTRATE AND ACETAMINOPHEN 10; 325 MG/1; MG/1
1 TABLET ORAL EVERY 4 HOURS PRN
Status: DISCONTINUED | OUTPATIENT
Start: 2023-04-20 | End: 2023-04-21

## 2023-04-20 RX ORDER — ACETAMINOPHEN 325 MG/1
650 TABLET ORAL EVERY 6 HOURS PRN
Status: DISCONTINUED | OUTPATIENT
Start: 2023-04-20 | End: 2023-04-21

## 2023-04-20 RX ORDER — DEXAMETHASONE SODIUM PHOSPHATE 4 MG/ML
VIAL (ML) INJECTION AS NEEDED
Status: DISCONTINUED | OUTPATIENT
Start: 2023-04-20 | End: 2023-04-20 | Stop reason: SURG

## 2023-04-20 RX ORDER — MULTIPLE VITAMINS W/ MINERALS TAB 9MG-400MCG
1 TAB ORAL DAILY
Qty: 30 TABLET | Refills: 0 | Status: SHIPPED | OUTPATIENT
Start: 2023-04-21

## 2023-04-20 RX ORDER — HYDROCODONE BITARTRATE AND ACETAMINOPHEN 10; 325 MG/1; MG/1
1 TABLET ORAL EVERY 6 HOURS PRN
Qty: 25 TABLET | Refills: 0 | Status: SHIPPED | OUTPATIENT
Start: 2023-04-20 | End: 2023-05-01

## 2023-04-20 RX ORDER — VANCOMYCIN HYDROCHLORIDE
15 ONCE
Status: COMPLETED | OUTPATIENT
Start: 2023-04-20 | End: 2023-04-21

## 2023-04-20 RX ORDER — ENOXAPARIN SODIUM 100 MG/ML
40 INJECTION SUBCUTANEOUS ONCE
Status: COMPLETED | OUTPATIENT
Start: 2023-04-20 | End: 2023-04-20

## 2023-04-20 RX ORDER — SODIUM CHLORIDE 9 MG/ML
INJECTION, SOLUTION INTRAVENOUS CONTINUOUS
Status: DISCONTINUED | OUTPATIENT
Start: 2023-04-20 | End: 2023-04-21

## 2023-04-20 RX ORDER — HYDROCODONE BITARTRATE AND ACETAMINOPHEN 7.5; 325 MG/1; MG/1
1 TABLET ORAL EVERY 6 HOURS PRN
Status: DISCONTINUED | OUTPATIENT
Start: 2023-04-20 | End: 2023-04-21

## 2023-04-20 RX ORDER — MONTELUKAST SODIUM 10 MG/1
10 TABLET ORAL NIGHTLY
Status: DISCONTINUED | OUTPATIENT
Start: 2023-04-20 | End: 2023-04-21

## 2023-04-20 RX ORDER — HYDROMORPHONE HYDROCHLORIDE 1 MG/ML
0.4 INJECTION, SOLUTION INTRAMUSCULAR; INTRAVENOUS; SUBCUTANEOUS
Status: DISCONTINUED | OUTPATIENT
Start: 2023-04-20 | End: 2023-04-21

## 2023-04-20 RX ORDER — MORPHINE SULFATE 1 MG/ML
INJECTION, SOLUTION EPIDURAL; INTRATHECAL; INTRAVENOUS
Status: COMPLETED | OUTPATIENT
Start: 2023-04-20 | End: 2023-04-20

## 2023-04-20 RX ORDER — MULTIPLE VITAMINS W/ MINERALS TAB 9MG-400MCG
1 TAB ORAL DAILY
Status: DISCONTINUED | OUTPATIENT
Start: 2023-04-20 | End: 2023-04-21

## 2023-04-20 RX ORDER — SODIUM CHLORIDE, SODIUM LACTATE, POTASSIUM CHLORIDE, CALCIUM CHLORIDE 600; 310; 30; 20 MG/100ML; MG/100ML; MG/100ML; MG/100ML
INJECTION, SOLUTION INTRAVENOUS CONTINUOUS
Status: DISCONTINUED | OUTPATIENT
Start: 2023-04-20 | End: 2023-04-20 | Stop reason: HOSPADM

## 2023-04-20 RX ORDER — ACETAMINOPHEN 500 MG
1000 TABLET ORAL ONCE
Status: COMPLETED | OUTPATIENT
Start: 2023-04-20 | End: 2023-04-20

## 2023-04-20 RX ORDER — NICOTINE POLACRILEX 4 MG
30 LOZENGE BUCCAL
Status: DISCONTINUED | OUTPATIENT
Start: 2023-04-20 | End: 2023-04-21

## 2023-04-20 RX ORDER — ONDANSETRON 2 MG/ML
4 INJECTION INTRAMUSCULAR; INTRAVENOUS EVERY 6 HOURS PRN
Status: DISCONTINUED | OUTPATIENT
Start: 2023-04-20 | End: 2023-04-21

## 2023-04-20 RX ORDER — CEFAZOLIN SODIUM 1 G/3ML
INJECTION, POWDER, FOR SOLUTION INTRAMUSCULAR; INTRAVENOUS AS NEEDED
Status: DISCONTINUED | OUTPATIENT
Start: 2023-04-20 | End: 2023-04-20 | Stop reason: SURG

## 2023-04-20 RX ORDER — CALCIUM CARBONATE-CHOLECALCIFEROL TAB 250 MG-125 UNIT 250-125 MG-UNIT
1 TAB ORAL 2 TIMES DAILY WITH MEALS
Qty: 60 TABLET | Refills: 0 | Status: SHIPPED | OUTPATIENT
Start: 2023-04-20

## 2023-04-20 RX ORDER — HYDROMORPHONE HYDROCHLORIDE 1 MG/ML
0.6 INJECTION, SOLUTION INTRAMUSCULAR; INTRAVENOUS; SUBCUTANEOUS
Status: DISCONTINUED | OUTPATIENT
Start: 2023-04-20 | End: 2023-04-21

## 2023-04-20 RX ORDER — LIDOCAINE HYDROCHLORIDE 10 MG/ML
INJECTION, SOLUTION EPIDURAL; INFILTRATION; INTRACAUDAL; PERINEURAL AS NEEDED
Status: DISCONTINUED | OUTPATIENT
Start: 2023-04-20 | End: 2023-04-20 | Stop reason: SURG

## 2023-04-20 RX ORDER — PSEUDOEPHEDRINE HCL 30 MG
1 TABLET ORAL DAILY
Status: DISCONTINUED | OUTPATIENT
Start: 2023-04-20 | End: 2023-04-20

## 2023-04-20 RX ORDER — DOCUSATE SODIUM 100 MG/1
100 CAPSULE, LIQUID FILLED ORAL 2 TIMES DAILY
Status: DISCONTINUED | OUTPATIENT
Start: 2023-04-20 | End: 2023-04-21

## 2023-04-20 RX ADMIN — TRANEXAMIC ACID 1000 MG: 10 INJECTION, SOLUTION INTRAVENOUS at 12:37:00

## 2023-04-20 RX ADMIN — ONDANSETRON 4 MG: 2 INJECTION INTRAMUSCULAR; INTRAVENOUS at 10:57:00

## 2023-04-20 RX ADMIN — MORPHINE SULFATE 0.2 MG: 1 INJECTION, SOLUTION EPIDURAL; INTRATHECAL; INTRAVENOUS at 10:45:00

## 2023-04-20 RX ADMIN — SODIUM CHLORIDE, SODIUM LACTATE, POTASSIUM CHLORIDE, CALCIUM CHLORIDE: 600; 310; 30; 20 INJECTION, SOLUTION INTRAVENOUS at 10:41:00

## 2023-04-20 RX ADMIN — LIDOCAINE HYDROCHLORIDE 5 ML: 10 INJECTION, SOLUTION INFILTRATION; PERINEURAL at 10:45:00

## 2023-04-20 RX ADMIN — DEXAMETHASONE SODIUM PHOSPHATE 4 MG: 4 MG/ML VIAL (ML) INJECTION at 10:57:00

## 2023-04-20 RX ADMIN — TRANEXAMIC ACID 1000 MG: 10 INJECTION, SOLUTION INTRAVENOUS at 11:00:00

## 2023-04-20 RX ADMIN — BUPIVACAINE HYDROCHLORIDE 1.5 ML: 7.5 INJECTION, SOLUTION INTRASPINAL at 10:45:00

## 2023-04-20 RX ADMIN — CEFAZOLIN SODIUM 2 G: 1 INJECTION, POWDER, FOR SOLUTION INTRAMUSCULAR; INTRAVENOUS at 10:58:00

## 2023-04-20 RX ADMIN — EPHEDRINE SULFATE 5 MG: 50 INJECTION INTRAVENOUS at 11:34:00

## 2023-04-20 RX ADMIN — LIDOCAINE HYDROCHLORIDE 50 MG: 10 INJECTION, SOLUTION EPIDURAL; INFILTRATION; INTRACAUDAL; PERINEURAL at 10:50:00

## 2023-04-20 NOTE — BRIEF OP NOTE
Pre-Operative Diagnosis: Primary osteoarthritis of right knee [M17.11]     Post-Operative Diagnosis: Primary osteoarthritis of right knee [M17.11]     Procedure Performed:   Right total knee arthroplasty with CT templated patient specific instruments    Surgeon(s) and Role:     * Waqar Sierra MD - Primary    Assistant(s): Jenise Gosselin, PA-C (first assistant); Marti Carvalho CSA (second assistant)    Anesthesia: Ulysess Candelario Less, CRNA with spinal using Duramorph/MAC     Surgical Findings: Manuel Persona LPS femur 9 right narrow, tibia E right with short stem extension, patella 41 x 10.0 mm, polyethylene 10 mm LPS Vivacit-E. Tourniquet 78 minutes at 300 mmHg. Drain: None  Specimen: Bone cuts to pathology   Complications: None  Estimated Blood Loss: 30 cc  Stable to PACU. Tranexamic acid 1 g IV second dose given in OR at end of case.     Mitul Lainez MD  4/20/2023  10:50 AM

## 2023-04-20 NOTE — CM/SW NOTE
[facility] referrals sent via Dr. Julieta Hannon preferred provider included [facility]). F2F entered. Stefany Dugan.  Ramirez Ordoñez RN, BSN  Nurse   [phone]

## 2023-04-20 NOTE — INTERVAL H&P NOTE
Pre-op Diagnosis: Primary osteoarthritis of right knee [M17.11]  Internal derangement of multiple sites of right knee [M23.91]    The above referenced H&P was reviewed by Mey Navarro MD on 4/20/2023, the patient was examined and no significant changes have occurred in the patient's condition since the H&P was performed. I discussed with the patient and/or legal representative the potential benefits, risks and side effects of this procedure; the likelihood of the patient achieving goals; and potential problems that might occur during recuperation. I discussed reasonable alternatives to the procedure, including risks, benefits and side effects related to the alternatives and risks related to not receiving this procedure. We will proceed with procedure as planned.

## 2023-04-20 NOTE — ANESTHESIA PROCEDURE NOTES
Spinal Block    Date/Time: 4/20/2023 10:45 AM    Performed by: Aleksey Marshall Si, CRNA  Authorized by: Aleksey Marshall Si, CRNA      General Information and Staff    Start Time:  4/20/2023 10:45 AM  End Time:  4/20/2023 10:47 AM  CRNA:  Aleksey Marshall Si, CRNA  Performed by:  CRNA  Patient Location:  OR  Site identification: surface landmarks    Reason for Block: at surgeon's request, post-op pain management and surgical anesthesia    Preanesthetic Checklist: patient identified, IV checked, risks and benefits discussed, monitors and equipment checked, pre-op evaluation, timeout performed, anesthesia consent and sterile technique used      Procedure Details    Patient Position:  Sitting  Prep: ChloraPrep and patient draped    Monitoring:  Continuous pulse ox, cardiac monitor and heart rate  Approach:  Midline  Location:  L4-5  Injection Technique:  Single-shot    Needle    Needle Type:  Quincke  Needle Gauge:  22 G  Needle Length:  3.5 in    Assessment    Sensory Level:  T8  Events: clear CSF, CSF aspirated, well tolerated and blood negative      Additional Comments

## 2023-04-20 NOTE — HOME CARE LIAISON
Received referral via [facility] for Home Health services. Spoke w/ patient and provided with list of [facility] [place] providers from [facility], choice is  [address] [facility] reserved in [facility] and contact information placed on AVS.Financial interest disclosure provided.  Notified ANJELICA/ Charbel Blackman

## 2023-04-20 NOTE — PLAN OF CARE
Problem: Patient Centered Care  Goal: Patient preferences are identified and integrated in the patient's plan of care  Description: Interventions:  - What would you like us to know as we care for you? Patient is from home with her daughter and she's her support system. - Provide timely, complete, and accurate information to patient/family  - Incorporate patient and family knowledge, values, beliefs, and cultural backgrounds into the planning and delivery of care  - Encourage patient/family to participate in care and decision-making at the level they choose  - Honor patient and family perspectives and choices  Outcome: Progressing     Problem: Diabetes/Glucose Control  Goal: Glucose maintained within prescribed range  Description: INTERVENTIONS:  - Monitor Blood Glucose as ordered  - Assess for signs and symptoms of hyperglycemia and hypoglycemia  - Administer ordered medications to maintain glucose within target range  - Assess barriers to adequate nutritional intake and initiate nutrition consult as needed  - Instruct patient on self management of diabetes  Outcome: Progressing     Problem: Patient/Family Goals  Goal: Patient/Family Long Term Goal  Description: Patient's Long Term Goal: Patient will have no complications from surgery and will be able to walk well. Interventions:  - Up as tolerated with walker, WBAT to right leg.  - Pain management with oral medication.  - Monitor incision for any signs of infection.   - Oral anticoagulation to prevent blood clots. - May apply ice to incision to prevent swelling or help reduce pain. - [facility] PT.    - See additional Care Plan goals for specific interventions  Outcome: Progressing  Goal: Patient/Family Short Term Goal  Description: Patient's Short Term Goal: Will pass PT and pain will be controlled with oral medication.     Interventions:   - Up as tolerated with walker, WBAT to right leg.  - Pain management with oral medication.  - Monitor incision for any signs of infection.   - SCD's to both legs and oral anticoagulation to prevent blood clots. - May apply ice to incision to prevent swelling or help reduce pain.  - PT/OT as ordered. - See additional Care Plan goals for specific interventions  Outcome: Progressing     Problem: PAIN - ADULT  Goal: Verbalizes/displays adequate comfort level or patient's stated pain goal  Description: INTERVENTIONS:  - Encourage pt to monitor pain and request assistance  - Assess pain using appropriate pain scale  - Administer analgesics based on type and severity of pain and evaluate response  - Implement non-pharmacological measures as appropriate and evaluate response  - Consider cultural and social influences on pain and pain management  - Manage/alleviate anxiety  - Utilize distraction and/or relaxation techniques  - Monitor for opioid side effects  - Notify MD/LIP if interventions unsuccessful or patient reports new pain  - Anticipate increased pain with activity and pre-medicate as appropriate  Outcome: Progressing     Problem: RISK FOR INFECTION - ADULT  Goal: Absence of fever/infection during anticipated neutropenic period  Description: INTERVENTIONS  - Monitor WBC  - Administer growth factors as ordered  - Implement neutropenic guidelines  Outcome: Progressing     Problem: SAFETY ADULT - FALL  Goal: Free from fall injury  Description: INTERVENTIONS:  - Assess pt frequently for physical needs  - Identify cognitive and physical deficits and behaviors that affect risk of falls.   - Hurricane fall precautions as indicated by assessment.  - Educate pt/family on patient safety including physical limitations  - Instruct pt to call for assistance with activity based on assessment  - Modify environment to reduce risk of injury  - Provide assistive devices as appropriate  - Consider OT/PT consult to assist with strengthening/mobility  - Encourage toileting schedule  Outcome: Progressing     Problem: DISCHARGE PLANNING  Goal: Discharge to home or other facility with appropriate resources  Description: INTERVENTIONS:  - Identify barriers to discharge w/pt and caregiver  - Include patient/family/discharge partner in discharge planning  - Arrange for needed discharge resources and transportation as appropriate  - Identify discharge learning needs (meds, wound care, etc)  - Arrange for interpreters to assist at discharge as needed  - Consider post-discharge preferences of patient/family/discharge partner  - Complete POLST form as appropriate  - Assess patient's ability to be responsible for managing their own health  - Refer to Case Management Department for coordinating discharge planning if the patient needs post-hospital services based on physician/LIP order or complex needs related to functional status, cognitive ability or social support system  Outcome: Progressing     Problem: METABOLIC/FLUID AND ELECTROLYTES - ADULT  Goal: Glucose maintained within prescribed range  Description: INTERVENTIONS:  - Monitor Blood Glucose as ordered  - Assess for signs and symptoms of hyperglycemia and hypoglycemia  - Administer ordered medications to maintain glucose within target range  - Assess barriers to adequate nutritional intake and initiate nutrition consult as needed  - Instruct patient on self management of diabetes  Outcome: Progressing  Goal: Hemodynamic stability and optimal renal function maintained  Description: INTERVENTIONS:  - Monitor labs and assess for signs and symptoms of volume excess or deficit  - Monitor intake, output and patient weight  - Monitor urine specific gravity, serum osmolarity and serum sodium as indicated or ordered  - Monitor response to interventions for patient's volume status, including labs, urine output, blood pressure (other measures as available)  - Encourage oral intake as appropriate  - Instruct patient on fluid and nutrition restrictions as appropriate  Outcome: Progressing     Problem: SKIN/TISSUE INTEGRITY - ADULT  Goal: Incision(s), wounds(s) or drain site(s) healing without S/S of infection  Description: INTERVENTIONS:  - Assess and document risk factors for pressure ulcer development  - Assess and document skin integrity  - Assess and document dressing/incision, wound bed, drain sites and surrounding tissue  - Implement wound care per orders  - Initiate isolation precautions as appropriate  - Initiate Pressure Ulcer prevention bundle as indicated  Outcome: Progressing     Problem: MUSCULOSKELETAL - ADULT  Goal: Return mobility to safest level of function  Description: INTERVENTIONS:  - Assess patient stability and activity tolerance for standing, transferring and ambulating w/ or w/o assistive devices  - Assist with transfers and ambulation using safe patient handling equipment as needed  - Ensure adequate protection for wounds/incisions during mobilization  - Obtain PT/OT consults as needed  - Advance activity as appropriate  - Communicate ordered activity level and limitations with patient/family  Outcome: Progressing  Goal: Maintain proper alignment of affected body part  Description: INTERVENTIONS:  - Support and protect limb and body alignment per provider's orders  - Instruct and reinforce with patient and family use of appropriate assistive device and precautions (e.g. spinal or hip dislocation precautions)  Outcome: Progressing    Patient is alert and oriented, aware to call for help as needed. Patient is currently in room air, denies shortness of breathing nor chest pain. Patient can be up with help using a walker, WBAT to right leg. Patient needs to void post surgery. Patient is denying pain at this time. Patient will be going home with [place] [id number] when stable.

## 2023-04-21 ENCOUNTER — HOSPITAL ENCOUNTER (EMERGENCY)
Facility: HOSPITAL | Age: 73
Discharge: HOME | End: 2023-04-21
Attending: EMERGENCY MEDICINE
Payer: MEDICARE

## 2023-04-21 VITALS
HEART RATE: 74 BPM | BODY MASS INDEX: 28.13 KG/M2 | SYSTOLIC BLOOD PRESSURE: 142 MMHG | DIASTOLIC BLOOD PRESSURE: 62 MMHG | RESPIRATION RATE: 16 BRPM | HEIGHT: 66 IN | WEIGHT: 175 LBS | TEMPERATURE: 98 F | OXYGEN SATURATION: 96 %

## 2023-04-21 VITALS
WEIGHT: 178 LBS | RESPIRATION RATE: 20 BRPM | SYSTOLIC BLOOD PRESSURE: 164 MMHG | BODY MASS INDEX: 28.61 KG/M2 | HEIGHT: 66 IN | OXYGEN SATURATION: 96 % | HEART RATE: 71 BPM | DIASTOLIC BLOOD PRESSURE: 64 MMHG | TEMPERATURE: 98 F

## 2023-04-21 DIAGNOSIS — T78.40XA ALLERGIC REACTION, INITIAL ENCOUNTER: Primary | ICD-10-CM

## 2023-04-21 LAB
ANION GAP SERPL CALC-SCNC: 10 MMOL/L (ref 0–18)
BUN BLD-MCNC: 14 MG/DL (ref 7–18)
BUN/CREAT SERPL: 16.7 (ref 10–20)
CALCIUM BLD-MCNC: 9.2 MG/DL (ref 8.5–10.1)
CHLORIDE SERPL-SCNC: 107 MMOL/L (ref 98–112)
CO2 SERPL-SCNC: 26 MMOL/L (ref 21–32)
CREAT BLD-MCNC: 0.84 MG/DL
EST. AVERAGE GLUCOSE BLD GHB EST-MCNC: 194 MG/DL (ref 68–126)
GFR SERPLBLD BASED ON 1.73 SQ M-ARVRAT: 73 ML/MIN/1.73M2 (ref 60–?)
GLUCOSE BLD-MCNC: 174 MG/DL (ref 70–99)
GLUCOSE BLDC GLUCOMTR-MCNC: 177 MG/DL (ref 70–99)
GLUCOSE BLDC GLUCOMTR-MCNC: 229 MG/DL (ref 70–99)
HBA1C MFR BLD: 8.4 % (ref ?–5.7)
HCT VFR BLD AUTO: 37 %
HGB BLD-MCNC: 11.9 G/DL
OSMOLALITY SERPL CALC.SUM OF ELEC: 301 MOSM/KG (ref 275–295)
POTASSIUM SERPL-SCNC: 3.9 MMOL/L (ref 3.5–5.1)
SODIUM SERPL-SCNC: 143 MMOL/L (ref 136–145)

## 2023-04-21 PROCEDURE — 99283 EMERGENCY DEPT VISIT LOW MDM: CPT

## 2023-04-21 PROCEDURE — 99239 HOSP IP/OBS DSCHRG MGMT >30: CPT | Performed by: INTERNAL MEDICINE

## 2023-04-21 PROCEDURE — 99284 EMERGENCY DEPT VISIT MOD MDM: CPT

## 2023-04-21 RX ORDER — IBUPROFEN 600 MG/1
600 TABLET ORAL EVERY 6 HOURS PRN
Status: DISCONTINUED | OUTPATIENT
Start: 2023-04-21 | End: 2023-04-22

## 2023-04-21 RX ORDER — DIPHENHYDRAMINE HCL 25 MG
50 CAPSULE ORAL ONCE
Status: COMPLETED | OUTPATIENT
Start: 2023-04-21 | End: 2023-04-21

## 2023-04-21 RX ORDER — HYDROXYZINE HYDROCHLORIDE 25 MG/1
TABLET, FILM COATED ORAL EVERY 4 HOURS PRN
Qty: 120 TABLET | Refills: 0 | Status: SHIPPED | OUTPATIENT
Start: 2023-04-21 | End: 2023-08-19

## 2023-04-21 RX ORDER — DIPHENHYDRAMINE HCL 25 MG
25 CAPSULE ORAL EVERY 6 HOURS PRN
Qty: 20 CAPSULE | Refills: 0 | Status: SHIPPED | OUTPATIENT
Start: 2023-04-21

## 2023-04-21 RX ORDER — HYDROXYZINE HYDROCHLORIDE 25 MG/1
50 TABLET, FILM COATED ORAL ONCE
Status: COMPLETED | OUTPATIENT
Start: 2023-04-21 | End: 2023-04-21

## 2023-04-21 NOTE — OPERATIVE REPORT
[facility]    PATIENT'S NAME: Karen Crain   ATTENDING PHYSICIAN: [address] BRANT Pineda MD   OPERATING PHYSICIAN: Michael Pineda MD   PATIENT ACCOUNT#:   [id number]    LOCATION:   Room 4 A [facility]  MEDICAL RECORD #:   [id number]       YOB: 1950  ADMISSION DATE:       04/20/2023      OPERATION DATE:  04/20/2023    OPERATIVE REPORT      PREOPERATIVE DIAGNOSIS:  Primary osteoarthritis, right knee. POSTOPERATIVE DIAGNOSIS:  Primary osteoarthritis, right knee. PROCEDURE:  Right total knee arthroplasty with MRI-templated patient-specific instruments. ASSISTANTS:    1. Dominique Curtis PA-C   2. MICHAELA Prater     ANESTHESIA:  Dolores Marshall CRNA, with spinal using Duramorph/MAC. INDICATIONS:  Patient is a 58-year-old woman with the above diagnosis documented by physical exam, x-ray, and MRI. She has failed nonoperative treatment. The risks and complications of surgery were discussed, and no guarantees were given. The consent was signed. The patient's comorbid conditions included diabetes and coronary artery disease. She is on Brilinta and aspirin normally. In addition, she has a significant block in flexion and only flexes to about 70 degrees. MRI templating and 2 surgical assistants were medically necessary because of the significant flexion issue. They were required mostly for the proper retraction necessary in a complicated case with a significant flexion block. This would not allow the use of standard instrumentation without significantly cutting far more soft tissue than necessary. In addition, they provided safe transfer to the operative table, placement of the tourniquet, help with prepping and draping, the necessary retraction as mentioned previously, help with deep and superficial closure, dressings, and safe transfer to the recovery room.   Without 2 surgical assistants as well as MRI templating, the surgery would have been far more difficult and less safe.  Therefore, all were medically necessary. OPERATIVE TECHNIQUE:  Patient was brought to the operating room and placed in supine position. Appropriate IV access and monitors were placed. Vancomycin 1.25 g was given as prophylaxis. It was only about USP in when we were going to start prep and we gave her Ancef 2 g. Despite her penicillin allergy, she tolerated this well. Tranexamic acid 1 g IV was also given as prophylaxis. A spinal Duramorph was done by Jeremías Marshall CRNA. Monitored sedation was placed, and a tourniquet was placed on the upper right thigh. SCD boot was on the left leg. The right lower extremity was then prepped and draped in a sterile fashion with Betadine, followed by ChloraPrep. The leg was exsanguinated and the tourniquet inflated to 300 mmHg. Tourniquet time for the case was 78 minutes. Incision was made, and a mid vastus snip was used to gain access to the knee. Tricompartmental osteoarthritis was confirmed. Effusion was suctioned out. Much of the fat pad was removed. The patella was everted and had large osteophytes. It was resected from 24 mm to 14 mm. A 41 mm patella fit optimally and the 3 drill holes placed. A protector was placed and patella tucked in the lateral gutter. The knee was flexed and we could only cut the cruciate ligaments, as there was significant bone overgrowth in the notch. Anterior menisci were excised. The patient specific guide was applied and fit well. The pins were placed, and the distal femoral cuts were made as templated. This allowed us to flex a little further and remove some of the overgrowth in the notch and cut more of the cruciate ligaments to help loosen the knee. The size 10 cutting block was applied and properly externally rotated. The cuts were made as templated. The rest of the menisci were excised, and the patient-specific guide was applied to the proximal tibia.   The drop jigna was in line with the anterior tibial spine with proper posterior slope. The cuts were made as templated. Spacer block showed good balance in flexion and extension with either a 10 or 12. We cut the LPS notch for the femur and then attempted trial reduction. The patient came out to full extension; however, flexion was somewhat tight. There was also significant overhang of the size 10, so we elected to downsize the femur to size 9. The 9 cutting block was applied, and the posterior chamfer cuts were both made. Alexandro wing showed that the spacer block was properly aligned with the anterior cut. With the size 9 femur, the knee had good extension and good flexion, both balanced now. The collateral ligaments were balanced in 0 degrees, 40 degrees, and 90 degrees. The patella was tracking well. There was proper rollback on deep flexion. There was no liftoff of the component. The tibia was nailed in its rotation, and the pegs were drilled for the femur and the stem prepared for the tibia. Two batches of Manuel methylmethacrylate cement with gentamicin were mixed, as she is diabetic. The bone surfaces were cleansed thoroughly with saline using Pulsavac lavage and then dried thoroughly. The cement was placed on the back of the components and pressurized into the bone surfaces. The tibia, followed by femur, followed by patella were cemented in standard fashion. A spacer was placed, and 1 of the assistants held axial pressure while excess cement was removed. During the last 4 minutes of cement drying, a dilute Betadine solution had been placed in the knee. After the cement had hardened, the Betadine was evacuated with saline, and any excess cement in the posterior knee was removed. Trial reduction showed the 10 mm spacer fit the best, better than the 11. Again, excellent motion, balance, and tracking were noted with proper rollback.     The components used were Manuel Persona LPS femur, 9 right narrow; tibia E right with short stem extension; patella 41 x 10.0; and a polyethylene 10 mm LPS Vivacit-E. The tourniquet was let down, and no significant bleeding was noted. Tranexamic acid 1 g IV second dose was given in the OR at the end the case. Closure was done in layers in flexion with staples for the skin and silver Mepilex dressing. Sterile cotton was applied, followed by a towel and ice packs. Ace wrap completed the dressing. The patient was taken out of sedation and brought to recovery room in stable condition. Blood loss was 30 mL. The specimen was the bone cuts to Pathology. There were no drains, no complications. Patient tolerated the procedure well. Dictated By Miriam Powers.  Aldair Arcoe MD  d: 04/20/2023 12:57:59  t: 04/20/2023 19:20:52  Job [id number]  MYF/    cc: Yessenia Perez MD

## 2023-04-21 NOTE — PHYSICAL THERAPY NOTE
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 RAQUEL Jansen approved participation in physical therapy. PPE worn by therapist: mask and gloves. Patient was wearing a mask during session. Patient presented in bedside chair with 2/10 pain. Patient with good  progress towards goals during this session. Education provided on Physical therapy plan of care and physiological benefits of out of bed mobility. Patient with good carryover. Pt was received in chair and cleared for therapy. Pt was supervision with sit to stand transfer with RW. Pt denied any dizziness. Pt is somewhat impulsive and required cueing for safety. Pt was able to ambulate 100 ft with RW and CGA. Pt presented R decreased stance time and narrow TONA. Pt was educated and able to ascend/descend 4 stairs x 1 rep with 1 railing and CGA. Pt was cued for proper techniques and sequencing. Returned pt back to her room. Pt was educated and performed bilateral LE therapeutic exercises to increase LE strength/endurance to improve functional mobility. Pt was left in chair with all needs within reach and alarm activated. Reported to RN on the status of the pt. Pt is on track to DC to Home with home health PT once medically cleared. Bed mobility: NT  Transfers: Supervision  Gait Assistance: Contact guard assist  Distance (ft): 80  Assistive Device: Rolling walker  Pattern: R Decreased stance time          Patient was left in bedside chair at end of session with all needs in reach.  The patient's Approx Degree of Impairment: 46.58% has been calculated based on documentation in the [facility] '6 clicks' Inpatient Basic Mobility Short Form. Research supports that patients with this level of impairment may benefit from Home with home health PT. RN aware of patient status post session. DISCHARGE RECOMMENDATIONS  PT Discharge Recommendations: Home with home health PT; Intermittent Supervision     PLAN  PT Treatment Plan: Endurance; Patient education;Gait training;Range of motion;Strengthening;Stair training    SUBJECTIVE  Pt was agreeable to therapy session. OBJECTIVE  Precautions: Limb alert - left    WEIGHT BEARING RESTRICTION  Weight Bearing Restriction: R lower extremity        R Lower Extremity: Weight Bearing as Tolerated       PAIN ASSESSMENT   Ratin  Location: Right knee  Management Techniques: Activity promotion; Body mechanics; Relaxation;Repositioning    BALANCE                                                                                                                       Static Sitting: Good  Dynamic Sitting: Good           Static Standing: Fair -  Dynamic Standing: Fair -    ACTIVITY TOLERANCE                         O2 WALK       AM-PAC '6-Clicks' INPATIENT SHORT FORM - BASIC MOBILITY  How much difficulty does the patient currently have. .. Patient Difficulty: Turning over in bed (including adjusting bedclothes, sheets and blankets)?: A Little   Patient Difficulty: Sitting down on and standing up from a chair with arms (e.g., wheelchair, bedside commode, etc.): A Little   Patient Difficulty: Moving from lying on back to sitting on the side of the bed?: A Little   How much help from another person does the patient currently need. ..    Help from Another: Moving to and from a bed to a chair (including a wheelchair)?: A Little   Help from Another: Need to walk in hospital room?: A Little   Help from Another: Climbing 3-5 steps with a railing?: A Little     AM-PAC Score:  Raw Score: 18   Approx Degree of Impairment: 46.58%   Standardized Score (AM-PAC Scale): 43.63   CMS Modifier (G-Code): CK          THERAPEUTIC EXERCISES  Lower Extremity Glut sets  Heel raises  Heel slides  Knee extension  Quad sets  Toe raises     Position Sitting       Patient End of Session: Up in chair;Needs met;Call light within reach;RN aware of session/findings; All patient questions and concerns addressed; Alarm set    CURRENT GOALS   Goals to be met by: 4/22/23  Patient Goal Patient's self-stated goal is: to go home with home PT   Goal #1 Patient is able to demonstrate supine - sit EOB @ level: modified independent     Goal #1   Current Status NT   Goal #2 Patient is able to demonstrate transfers Sit to/from Stand at assistance level: supervision     Goal #2  Current Status Supervision   Goal #3 Patient is able to ambulate 150 feet with assistive device at assistance level: supervision   Goal #3   Current Status Ambulated 80 ft with RW and CGA   Goal #4 Patient will negotiate 4 stairs/one curb w/ assistive device and supervision   Goal #4   Current Status Negotiated 4 stairs x 1 rep with CGA and 1 rail   Goal #5  AROM 0 degrees extension to 95 degrees flexion     Goal #5   Current Status In progress   Goal #6 Patient independently performs home exercise program for ROM/strengthening per the instructions provided in preparation for discharge.    Goal #6  Current Status Educated and performed       PT Session Time: 30 minutes  Therapeutic Activity: 15 minutes  Therapeutic Exercise: 15 minutes      Documentation by OMA Garcia  Cosign by Sedrick Bell PTA

## 2023-04-21 NOTE — PLAN OF CARE
Patient is alert and oriented. RA. SCDs on for DVT prophylaxis. Voiding freely. Up x1 with walker. Dressing in place to right knee, CDI. Gel ice packs. ACHS accucheck. Denies pain. PRN benadryl and nubain given for itching. Patient washed up in bathroom this AM, sitting in chair. Call light within reach. Problem: Patient Centered Care  Goal: Patient preferences are identified and integrated in the patient's plan of care  Description: Interventions:  - What would you like us to know as we care for you? Patient is from home with her daughter and she's her support system.   - Provide timely, complete, and accurate information to patient/family  - Incorporate patient and family knowledge, values, beliefs, and cultural backgrounds into the planning and delivery of care  - Encourage patient/family to participate in care and decision-making at the level they choose  - Honor patient and family perspectives and choices  Outcome: Progressing     Problem: Diabetes/Glucose Control  Goal: Glucose maintained within prescribed range  Description: INTERVENTIONS:  - Monitor Blood Glucose as ordered  - Assess for signs and symptoms of hyperglycemia and hypoglycemia  - Administer ordered medications to maintain glucose within target range  - Assess barriers to adequate nutritional intake and initiate nutrition consult as needed  - Instruct patient on self management of diabetes  Outcome: Progressing     Problem: PAIN - ADULT  Goal: Verbalizes/displays adequate comfort level or patient's stated pain goal  Description: INTERVENTIONS:  - Encourage pt to monitor pain and request assistance  - Assess pain using appropriate pain scale  - Administer analgesics based on type and severity of pain and evaluate response  - Implement non-pharmacological measures as appropriate and evaluate response  - Consider cultural and social influences on pain and pain management  - Manage/alleviate anxiety  - Utilize distraction and/or relaxation techniques  - Monitor for opioid side effects  - Notify MD/LIP if interventions unsuccessful or patient reports new pain  - Anticipate increased pain with activity and pre-medicate as appropriate  Outcome: Progressing     Problem: RISK FOR INFECTION - ADULT  Goal: Absence of fever/infection during anticipated neutropenic period  Description: INTERVENTIONS  - Monitor WBC  - Administer growth factors as ordered  - Implement neutropenic guidelines  Outcome: Progressing     Problem: SAFETY ADULT - FALL  Goal: Free from fall injury  Description: INTERVENTIONS:  - Assess pt frequently for physical needs  - Identify cognitive and physical deficits and behaviors that affect risk of falls.   - [place] fall precautions as indicated by assessment.  - Educate pt/family on patient safety including physical limitations  - Instruct pt to call for assistance with activity based on assessment  - Modify environment to reduce risk of injury  - Provide assistive devices as appropriate  - Consider OT/PT consult to assist with strengthening/mobility  - Encourage toileting schedule  Outcome: Progressing     Problem: DISCHARGE PLANNING  Goal: Discharge to home or other facility with appropriate resources  Description: INTERVENTIONS:  - Identify barriers to discharge w/pt and caregiver  - Include patient/family/discharge partner in discharge planning  - Arrange for needed discharge resources and transportation as appropriate  - Identify discharge learning needs (meds, wound care, etc)  - Arrange for interpreters to assist at discharge as needed  - Consider post-discharge preferences of patient/family/discharge partner  - Complete POLST form as appropriate  - Assess patient's ability to be responsible for managing their own health  - Refer to Case Management Department for coordinating discharge planning if the patient needs post-hospital services based on physician/LIP order or complex needs related to functional status, cognitive ability or social support system  Outcome: Progressing     Problem: METABOLIC/FLUID AND ELECTROLYTES - ADULT  Goal: Glucose maintained within prescribed range  Description: INTERVENTIONS:  - Monitor Blood Glucose as ordered  - Assess for signs and symptoms of hyperglycemia and hypoglycemia  - Administer ordered medications to maintain glucose within target range  - Assess barriers to adequate nutritional intake and initiate nutrition consult as needed  - Instruct patient on self management of diabetes  Outcome: Progressing  Goal: Hemodynamic stability and optimal renal function maintained  Description: INTERVENTIONS:  - Monitor labs and assess for signs and symptoms of volume excess or deficit  - Monitor intake, output and patient weight  - Monitor urine specific gravity, serum osmolarity and serum sodium as indicated or ordered  - Monitor response to interventions for patient's volume status, including labs, urine output, blood pressure (other measures as available)  - Encourage oral intake as appropriate  - Instruct patient on fluid and nutrition restrictions as appropriate  Outcome: Progressing     Problem: SKIN/TISSUE INTEGRITY - ADULT  Goal: Incision(s), wounds(s) or drain site(s) healing without S/S of infection  Description: INTERVENTIONS:  - Assess and document risk factors for pressure ulcer development  - Assess and document skin integrity  - Assess and document dressing/incision, wound bed, drain sites and surrounding tissue  - Implement wound care per orders  - Initiate isolation precautions as appropriate  - Initiate Pressure Ulcer prevention bundle as indicated  Outcome: Progressing     Problem: MUSCULOSKELETAL - ADULT  Goal: Return mobility to safest level of function  Description: INTERVENTIONS:  - Assess patient stability and activity tolerance for standing, transferring and ambulating w/ or w/o assistive devices  - Assist with transfers and ambulation using safe patient handling equipment as needed  - Ensure adequate protection for wounds/incisions during mobilization  - Obtain PT/OT consults as needed  - Advance activity as appropriate  - Communicate ordered activity level and limitations with patient/family  Outcome: Progressing  Goal: Maintain proper alignment of affected body part  Description: INTERVENTIONS:  - Support and protect limb and body alignment per provider's orders  - Instruct and reinforce with patient and family use of appropriate assistive device and precautions (e.g. spinal or hip dislocation precautions)  Outcome: Progressing

## 2023-04-21 NOTE — PLAN OF CARE
Problem: Patient Centered Care  Goal: Patient preferences are identified and integrated in the patient's plan of care  Description: Interventions:  - What would you like us to know as we care for you? Patient is from home with her daughter and she's her support system. - Provide timely, complete, and accurate information to patient/family  - Incorporate patient and family knowledge, values, beliefs, and cultural backgrounds into the planning and delivery of care  - Encourage patient/family to participate in care and decision-making at the level they choose  - Honor patient and family perspectives and choices  Outcome: Adequate for Discharge     Problem: Diabetes/Glucose Control  Goal: Glucose maintained within prescribed range  Description: INTERVENTIONS:  - Monitor Blood Glucose as ordered  - Assess for signs and symptoms of hyperglycemia and hypoglycemia  - Administer ordered medications to maintain glucose within target range  - Assess barriers to adequate nutritional intake and initiate nutrition consult as needed  - Instruct patient on self management of diabetes  Outcome: Adequate for Discharge     Problem: Patient/Family Goals  Goal: Patient/Family Long Term Goal  Description: Patient's Long Term Goal: Patient will have no complications from surgery and will be able to walk well. Interventions:  - Up as tolerated with walker, WBAT to right leg.  - Pain management with oral medication.  - Monitor incision for any signs of infection.   - Oral anticoagulation to prevent blood clots. - May apply ice to incision to prevent swelling or help reduce pain. - [facility] PT.    - See additional Care Plan goals for specific interventions  Outcome: Adequate for Discharge  Goal: Patient/Family Short Term Goal  Description: Patient's Short Term Goal: Will pass PT and pain will be controlled with oral medication.     Interventions:   - Up as tolerated with walker, WBAT to right leg.  - Pain management with oral medication.  - Monitor incision for any signs of infection.   - SCD's to both legs and oral anticoagulation to prevent blood clots. - May apply ice to incision to prevent swelling or help reduce pain.  - PT/OT as ordered. - See additional Care Plan goals for specific interventions  Outcome: Adequate for Discharge     Problem: PAIN - ADULT  Goal: Verbalizes/displays adequate comfort level or patient's stated pain goal  Description: INTERVENTIONS:  - Encourage pt to monitor pain and request assistance  - Assess pain using appropriate pain scale  - Administer analgesics based on type and severity of pain and evaluate response  - Implement non-pharmacological measures as appropriate and evaluate response  - Consider cultural and social influences on pain and pain management  - Manage/alleviate anxiety  - Utilize distraction and/or relaxation techniques  - Monitor for opioid side effects  - Notify MD/LIP if interventions unsuccessful or patient reports new pain  - Anticipate increased pain with activity and pre-medicate as appropriate  Outcome: Adequate for Discharge     Problem: RISK FOR INFECTION - ADULT  Goal: Absence of fever/infection during anticipated neutropenic period  Description: INTERVENTIONS  - Monitor WBC  - Administer growth factors as ordered  - Implement neutropenic guidelines  Outcome: Adequate for Discharge     Problem: SAFETY ADULT - FALL  Goal: Free from fall injury  Description: INTERVENTIONS:  - Assess pt frequently for physical needs  - Identify cognitive and physical deficits and behaviors that affect risk of falls.   - [place] fall precautions as indicated by assessment.  - Educate pt/family on patient safety including physical limitations  - Instruct pt to call for assistance with activity based on assessment  - Modify environment to reduce risk of injury  - Provide assistive devices as appropriate  - Consider OT/PT consult to assist with strengthening/mobility  - Encourage toileting schedule  Outcome: Adequate for Discharge     Problem: DISCHARGE PLANNING  Goal: Discharge to home or other facility with appropriate resources  Description: INTERVENTIONS:  - Identify barriers to discharge w/pt and caregiver  - Include patient/family/discharge partner in discharge planning  - Arrange for needed discharge resources and transportation as appropriate  - Identify discharge learning needs (meds, wound care, etc)  - Arrange for interpreters to assist at discharge as needed  - Consider post-discharge preferences of patient/family/discharge partner  - Complete POLST form as appropriate  - Assess patient's ability to be responsible for managing their own health  - Refer to Case Management Department for coordinating discharge planning if the patient needs post-hospital services based on physician/LIP order or complex needs related to functional status, cognitive ability or social support system  Outcome: Adequate for Discharge     Problem: METABOLIC/FLUID AND ELECTROLYTES - ADULT  Goal: Glucose maintained within prescribed range  Description: INTERVENTIONS:  - Monitor Blood Glucose as ordered  - Assess for signs and symptoms of hyperglycemia and hypoglycemia  - Administer ordered medications to maintain glucose within target range  - Assess barriers to adequate nutritional intake and initiate nutrition consult as needed  - Instruct patient on self management of diabetes  Outcome: Adequate for Discharge  Goal: Hemodynamic stability and optimal renal function maintained  Description: INTERVENTIONS:  - Monitor labs and assess for signs and symptoms of volume excess or deficit  - Monitor intake, output and patient weight  - Monitor urine specific gravity, serum osmolarity and serum sodium as indicated or ordered  - Monitor response to interventions for patient's volume status, including labs, urine output, blood pressure (other measures as available)  - Encourage oral intake as appropriate  - Instruct patient on fluid and nutrition restrictions as appropriate  Outcome: Adequate for Discharge     Problem: SKIN/TISSUE INTEGRITY - ADULT  Goal: Incision(s), wounds(s) or drain site(s) healing without S/S of infection  Description: INTERVENTIONS:  - Assess and document risk factors for pressure ulcer development  - Assess and document skin integrity  - Assess and document dressing/incision, wound bed, drain sites and surrounding tissue  - Implement wound care per orders  - Initiate isolation precautions as appropriate  - Initiate Pressure Ulcer prevention bundle as indicated  Outcome: Adequate for Discharge     Problem: MUSCULOSKELETAL - ADULT  Goal: Return mobility to safest level of function  Description: INTERVENTIONS:  - Assess patient stability and activity tolerance for standing, transferring and ambulating w/ or w/o assistive devices  - Assist with transfers and ambulation using safe patient handling equipment as needed  - Ensure adequate protection for wounds/incisions during mobilization  - Obtain PT/OT consults as needed  - Advance activity as appropriate  - Communicate ordered activity level and limitations with patient/family  Outcome: Adequate for Discharge  Goal: Maintain proper alignment of affected body part  Description: INTERVENTIONS:  - Support and protect limb and body alignment per provider's orders  - Instruct and reinforce with patient and family use of appropriate assistive device and precautions (e.g. spinal or hip dislocation precautions)  Outcome: Adequate for Discharge    Patient is alert and oriented, aware to call for help as needed. Patient is currently in room air, denies shortness of breathing nor chest pain. Patient is up with a walker with SBA. Patient takes oral medication for pain. Patient is voiding well, passing gas but denies having a bowel movement. Patient will be going home with [place] [id number] when stable.

## 2023-04-21 NOTE — CM/SW NOTE
04/21/23 0900   Discharge disposition   Expected discharge disposition Home-Health   Post Acute Care Provider Residential   Discharge transportation Private car     [facility] notified of dc today via secure chat. Residential home healthcare  P:[phone]  [address] Adin Chisholm RN, BSN  Nurse   [phone]

## 2023-04-22 NOTE — ED INITIAL ASSESSMENT (HPI)
Pt reports right knee replacement yesterday with inpatient stay last night. Pt reports she started with generalized itching, most intense in feet, last night. Pt reports given po benadryl in hospital that provided minimal relief. Pt denies SOB or difficulty breathing. No rash noted.

## 2023-04-27 ENCOUNTER — PATIENT OUTREACH (OUTPATIENT)
Dept: CASE MANAGEMENT | Age: 73
End: 2023-04-27

## 2023-04-27 NOTE — PROGRESS NOTES
1st attempt DM Questions  Pt answered all questions, does not want to make dm apt at this time  Closing encounter      Diabetic Outreach D/C Follow Up Questions:     1. Did you receive the information provided during your hospitalization and at discharge about diabetes? yes    If No:  Would you like us to mail you the information? no   If Yes:  Was the information helpful? Yes              2. Do you have all of your diabetes medications now that you are home? Yes  If yes:  do you understand how to take your medications. yes    If No: Are there barriers to getting your medications? no       3. Did you make the necessary transportation arrangements to get to your diabetes follow-up appointment? Yes         If pt answers No to questions #2 and #3 Advise pt you will have a clinical person contact them to address.

## 2023-04-28 ENCOUNTER — TELEPHONE (OUTPATIENT)
Dept: ORTHOPEDICS CLINIC | Facility: CLINIC | Age: 73
End: 2023-04-28

## 2023-04-28 NOTE — TELEPHONE ENCOUNTER
The acetaminophen with hydrocodone 10 mg is much stronger than plain Tylenol. Norco 10 mg as a combination with 325 mg of acetaminophen and 10 mg of hydrocodone narcotic. The acetaminophen by itself is simply Tylenol. I hope that answers the question.

## 2023-04-28 NOTE — TELEPHONE ENCOUNTER
S/P right total knee 4/20/23     Patient wondering if what pain medication is stronger    Acetaminophen or HYDROcodone-acetaminophen  MG Oral Tab    Advised patient the hydrocodone-acetaminophen 10-325mg is the stronger pain medication. Patient verbalized understanding. Patient also wanted to make Dr. Sunday Spencer aware of her blood sugar being elevated per patient her blood sugar reading today was 229. Per patient home health RN advised patient to continue to monitor given recent surgery.     BRYAN

## 2023-05-01 ENCOUNTER — TELEPHONE (OUTPATIENT)
Dept: ORTHOPEDICS CLINIC | Facility: CLINIC | Age: 73
End: 2023-05-01

## 2023-05-01 DIAGNOSIS — M17.11 PRIMARY OSTEOARTHRITIS OF RIGHT KNEE: Primary | ICD-10-CM

## 2023-05-01 DIAGNOSIS — Z96.651 S/P TKR (TOTAL KNEE REPLACEMENT) USING CEMENT, RIGHT: ICD-10-CM

## 2023-05-01 RX ORDER — HYDROCODONE BITARTRATE AND ACETAMINOPHEN 10; 325 MG/1; MG/1
1 TABLET ORAL EVERY 6 HOURS PRN
Qty: 25 TABLET | Refills: 0 | Status: SHIPPED | OUTPATIENT
Start: 2023-05-01

## 2023-05-01 RX ORDER — IBUPROFEN 400 MG/1
400 TABLET ORAL 3 TIMES DAILY
Qty: 40 TABLET | Refills: 0 | Status: SHIPPED | OUTPATIENT
Start: 2023-05-01

## 2023-05-01 NOTE — TELEPHONE ENCOUNTER
S/w patient- Patient s/p right TKA on 4/20/23. Patient states that she is still in a fair amount of pain and rates pain at 7/10. Patient has been taking norco and ibuprofen twice a day. Patient states that she is down to her last few norco and was wondering if she could get a refill for both medications. Patient states that she has been icing and taking tylenol intermittently as well.     Ibuprofen Request  Last rx: 4/20/23 #40, 0 refill  Sx: 4/20/23    Norco  request  Last rx: 4/20/23  #25,  0 refill  Sx: 4/20/23    Please review and send rx if appropriate

## 2023-05-09 ENCOUNTER — HOSPITAL ENCOUNTER (OUTPATIENT)
Dept: GENERAL RADIOLOGY | Facility: HOSPITAL | Age: 73
Discharge: HOME | End: 2023-05-09
Payer: MEDICARE

## 2023-05-09 ENCOUNTER — OFFICE VISIT (OUTPATIENT)
Dept: ORTHOPEDICS CLINIC | Facility: CLINIC | Age: 73
End: 2023-05-09

## 2023-05-09 DIAGNOSIS — Z47.89 ORTHOPEDIC AFTERCARE: ICD-10-CM

## 2023-05-09 DIAGNOSIS — Z96.651 S/P TKR (TOTAL KNEE REPLACEMENT) USING CEMENT, RIGHT: ICD-10-CM

## 2023-05-09 DIAGNOSIS — M17.11 PRIMARY OSTEOARTHRITIS OF RIGHT KNEE: Primary | ICD-10-CM

## 2023-05-09 PROCEDURE — 99024 POSTOP FOLLOW-UP VISIT: CPT

## 2023-05-09 PROCEDURE — 1111F DSCHRG MED/CURRENT MED MERGE: CPT

## 2023-05-09 PROCEDURE — 73562 X-RAY EXAM OF KNEE 3: CPT

## 2023-05-09 RX ORDER — HYDROCODONE BITARTRATE AND ACETAMINOPHEN 7.5; 325 MG/1; MG/1
1 TABLET ORAL EVERY 12 HOURS PRN
Qty: 14 TABLET | Refills: 0 | Status: SHIPPED | OUTPATIENT
Start: 2023-05-09

## 2023-05-09 RX ORDER — ACETAMINOPHEN 325 MG/1
TABLET ORAL
Qty: 40 TABLET | Refills: 0 | Status: SHIPPED | OUTPATIENT
Start: 2023-05-09

## 2023-05-09 NOTE — TELEPHONE ENCOUNTER
Rx request for Acetaminophen 325mg, please review and sign off if appropriate. Thank you. Last seen: 4/20/23 Right total knee arthroplasty  Last refill: 4/20/23 #40 with 0 reiflls.

## 2023-05-15 ENCOUNTER — TELEPHONE (OUTPATIENT)
Dept: PHYSICAL THERAPY | Facility: HOSPITAL | Age: 73
End: 2023-05-15

## 2023-05-15 DIAGNOSIS — Z96.651 S/P TKR (TOTAL KNEE REPLACEMENT) USING CEMENT, RIGHT: ICD-10-CM

## 2023-05-15 DIAGNOSIS — M17.11 PRIMARY OSTEOARTHRITIS OF RIGHT KNEE: ICD-10-CM

## 2023-05-16 ENCOUNTER — OFFICE VISIT (OUTPATIENT)
Dept: PHYSICAL THERAPY | Facility: HOSPITAL | Age: 73
End: 2023-05-16
Payer: MEDICARE

## 2023-05-16 DIAGNOSIS — M17.11 PRIMARY OSTEOARTHRITIS OF RIGHT KNEE: ICD-10-CM

## 2023-05-16 DIAGNOSIS — Z96.651 S/P TKR (TOTAL KNEE REPLACEMENT) USING CEMENT, RIGHT: ICD-10-CM

## 2023-05-16 PROCEDURE — 97162 PT EVAL MOD COMPLEX 30 MIN: CPT

## 2023-05-16 PROCEDURE — 97110 THERAPEUTIC EXERCISES: CPT

## 2023-05-17 RX ORDER — IBUPROFEN 400 MG/1
TABLET ORAL
Qty: 40 TABLET | Refills: 0 | Status: SHIPPED | OUTPATIENT
Start: 2023-05-17

## 2023-05-17 NOTE — TELEPHONE ENCOUNTER
Pt calling asking for a refill on the following medications please advise     HYDROcodone-acetaminophen 7.5-325 MG Oral Tab    ibuprofen 400 MG Oral Tab

## 2023-05-17 NOTE — TELEPHONE ENCOUNTER
Patient requesting refill request for hydrocodone-acetaminophen 7.5-325mg last rx 5/9/23. Also requesting ibuprofen 400mg last rx 5/1/23 please advise.

## 2023-05-18 ENCOUNTER — OFFICE VISIT (OUTPATIENT)
Dept: PHYSICAL THERAPY | Facility: HOSPITAL | Age: 73
End: 2023-05-18
Payer: MEDICARE

## 2023-05-18 PROCEDURE — 97140 MANUAL THERAPY 1/> REGIONS: CPT

## 2023-05-18 PROCEDURE — 97110 THERAPEUTIC EXERCISES: CPT

## 2023-05-22 RX ORDER — ACETAMINOPHEN 325 MG/1
TABLET ORAL
Qty: 40 TABLET | Refills: 0 | Status: SHIPPED | OUTPATIENT
Start: 2023-05-22

## 2023-05-22 NOTE — TELEPHONE ENCOUNTER
Per pt has not received a response regarding refills. Lost call due to connection error.  Please advise

## 2023-05-22 NOTE — TELEPHONE ENCOUNTER
Patient requesting follow up on hydrocodone-acetaminophen 7.5-325mg refill request last rx 5/9/23   S/P right total Knee 4/13/23     Please advise on refill request.

## 2023-05-22 NOTE — TELEPHONE ENCOUNTER
Per pt asking if she can refill for tylenol 325mg tablets, does not want norco anymore. Please advise thank you.

## 2023-05-23 ENCOUNTER — OFFICE VISIT (OUTPATIENT)
Dept: PHYSICAL THERAPY | Facility: HOSPITAL | Age: 73
End: 2023-05-23
Payer: MEDICARE

## 2023-05-23 PROCEDURE — 97110 THERAPEUTIC EXERCISES: CPT

## 2023-05-23 PROCEDURE — 97140 MANUAL THERAPY 1/> REGIONS: CPT

## 2023-05-25 ENCOUNTER — OFFICE VISIT (OUTPATIENT)
Dept: PHYSICAL THERAPY | Facility: HOSPITAL | Age: 73
End: 2023-05-25
Payer: MEDICARE

## 2023-05-25 PROCEDURE — 97110 THERAPEUTIC EXERCISES: CPT

## 2023-05-25 PROCEDURE — 97140 MANUAL THERAPY 1/> REGIONS: CPT

## 2023-05-30 ENCOUNTER — OFFICE VISIT (OUTPATIENT)
Dept: PHYSICAL THERAPY | Facility: HOSPITAL | Age: 73
End: 2023-05-30
Payer: MEDICARE

## 2023-05-30 PROCEDURE — 97110 THERAPEUTIC EXERCISES: CPT

## 2023-05-30 PROCEDURE — 97140 MANUAL THERAPY 1/> REGIONS: CPT

## 2023-06-01 ENCOUNTER — APPOINTMENT (OUTPATIENT)
Dept: PHYSICAL THERAPY | Facility: HOSPITAL | Age: 73
End: 2023-06-01
Payer: MEDICARE

## 2023-06-06 ENCOUNTER — OFFICE VISIT (OUTPATIENT)
Dept: ORTHOPEDICS CLINIC | Facility: CLINIC | Age: 73
End: 2023-06-06

## 2023-06-06 ENCOUNTER — HOSPITAL ENCOUNTER (OUTPATIENT)
Dept: GENERAL RADIOLOGY | Facility: HOSPITAL | Age: 73
Discharge: HOME | End: 2023-06-06
Payer: MEDICARE

## 2023-06-06 ENCOUNTER — APPOINTMENT (OUTPATIENT)
Dept: PHYSICAL THERAPY | Facility: HOSPITAL | Age: 73
End: 2023-06-06
Payer: MEDICARE

## 2023-06-06 ENCOUNTER — TELEPHONE (OUTPATIENT)
Dept: ORTHOPEDICS CLINIC | Facility: CLINIC | Age: 73
End: 2023-06-06

## 2023-06-06 DIAGNOSIS — Z96.651 S/P TKR (TOTAL KNEE REPLACEMENT) USING CEMENT, RIGHT: ICD-10-CM

## 2023-06-06 DIAGNOSIS — Z47.89 ORTHOPEDIC AFTERCARE: ICD-10-CM

## 2023-06-06 DIAGNOSIS — M17.11 PRIMARY OSTEOARTHRITIS OF RIGHT KNEE: Primary | ICD-10-CM

## 2023-06-06 PROCEDURE — 99024 POSTOP FOLLOW-UP VISIT: CPT

## 2023-06-06 PROCEDURE — 73562 X-RAY EXAM OF KNEE 3: CPT

## 2023-06-06 RX ORDER — PANTOPRAZOLE SODIUM 40 MG/1
TABLET, DELAYED RELEASE ORAL
COMMUNITY
Start: 2023-06-05

## 2023-06-06 RX ORDER — DIPHENHYDRAMINE HCL 25 MG
25 CAPSULE ORAL NIGHTLY PRN
Qty: 10 CAPSULE | Refills: 0 | Status: SHIPPED | OUTPATIENT
Start: 2023-06-06

## 2023-06-06 NOTE — TELEPHONE ENCOUNTER
I read today's note. If she feels she can move her right foot from the gas to the brake suddenly, she is not distracted by pain, and she has not taken a Norco in over 4 hours, she may drive as long as she wears her seatbelt. If she feels that she would be unsure about getting her foot from the gas to the brake or that she is distracted by pain, she may not start driving.

## 2023-06-06 NOTE — TELEPHONE ENCOUNTER
Called pt and informed her per Dr Odessa Parsons message and she verbalized understanding. She had no further q's.

## 2023-06-08 ENCOUNTER — OFFICE VISIT (OUTPATIENT)
Dept: PHYSICAL THERAPY | Facility: HOSPITAL | Age: 73
End: 2023-06-08
Payer: MEDICARE

## 2023-06-08 PROCEDURE — 97140 MANUAL THERAPY 1/> REGIONS: CPT

## 2023-06-08 PROCEDURE — 97110 THERAPEUTIC EXERCISES: CPT

## 2023-06-14 ENCOUNTER — OFFICE VISIT (OUTPATIENT)
Dept: PHYSICAL THERAPY | Facility: HOSPITAL | Age: 73
End: 2023-06-14
Payer: MEDICARE

## 2023-06-14 PROCEDURE — 97140 MANUAL THERAPY 1/> REGIONS: CPT

## 2023-06-14 PROCEDURE — 97110 THERAPEUTIC EXERCISES: CPT

## 2023-06-14 RX ORDER — DIPHENHYDRAMINE HCL 25 MG
25 CAPSULE ORAL NIGHTLY PRN
Qty: 10 CAPSULE | Refills: 0 | Status: SHIPPED | OUTPATIENT
Start: 2023-06-14

## 2023-06-14 NOTE — TELEPHONE ENCOUNTER
Patient requesting refill on benedryl and tylenol    Tylenol 500mg-1000mg  Last rx: 6/6/23- #20, 0 refills  LOV: 6/6/23    Benedryl 25mg  Last rx: 6/6/23- #10, 0 refills  LOV 6/6/23    I pended both orders.  Please sign if you approve

## 2023-06-16 ENCOUNTER — OFFICE VISIT (OUTPATIENT)
Dept: PHYSICAL THERAPY | Facility: HOSPITAL | Age: 73
End: 2023-06-16
Payer: MEDICARE

## 2023-06-16 PROCEDURE — 97140 MANUAL THERAPY 1/> REGIONS: CPT

## 2023-06-16 PROCEDURE — 97110 THERAPEUTIC EXERCISES: CPT

## 2023-06-20 ENCOUNTER — OFFICE VISIT (OUTPATIENT)
Dept: PHYSICAL THERAPY | Facility: HOSPITAL | Age: 73
End: 2023-06-20
Payer: MEDICARE

## 2023-06-20 PROCEDURE — 97110 THERAPEUTIC EXERCISES: CPT

## 2023-06-20 PROCEDURE — 97140 MANUAL THERAPY 1/> REGIONS: CPT

## 2023-06-22 ENCOUNTER — APPOINTMENT (OUTPATIENT)
Dept: PHYSICAL THERAPY | Facility: HOSPITAL | Age: 73
End: 2023-06-22
Payer: MEDICARE

## 2023-06-27 ENCOUNTER — HOSPITAL ENCOUNTER (OUTPATIENT)
Dept: GENERAL RADIOLOGY | Facility: HOSPITAL | Age: 73
Discharge: HOME | End: 2023-06-27
Payer: MEDICARE

## 2023-06-27 ENCOUNTER — APPOINTMENT (OUTPATIENT)
Dept: PHYSICAL THERAPY | Facility: HOSPITAL | Age: 73
End: 2023-06-27
Payer: MEDICARE

## 2023-06-27 ENCOUNTER — OFFICE VISIT (OUTPATIENT)
Dept: ORTHOPEDICS CLINIC | Facility: CLINIC | Age: 73
End: 2023-06-27

## 2023-06-27 DIAGNOSIS — M17.11 PRIMARY OSTEOARTHRITIS OF RIGHT KNEE: Primary | ICD-10-CM

## 2023-06-27 DIAGNOSIS — Z96.651 S/P TKR (TOTAL KNEE REPLACEMENT) USING CEMENT, RIGHT: ICD-10-CM

## 2023-06-27 DIAGNOSIS — Z47.89 ORTHOPEDIC AFTERCARE: ICD-10-CM

## 2023-06-27 PROCEDURE — 99024 POSTOP FOLLOW-UP VISIT: CPT

## 2023-06-27 PROCEDURE — 73562 X-RAY EXAM OF KNEE 3: CPT

## 2023-06-27 RX ORDER — DIPHENHYDRAMINE HCL 25 MG
25 CAPSULE ORAL NIGHTLY PRN
Qty: 20 CAPSULE | Refills: 0 | Status: SHIPPED | OUTPATIENT
Start: 2023-06-27

## 2023-06-29 ENCOUNTER — OFFICE VISIT (OUTPATIENT)
Dept: PHYSICAL THERAPY | Facility: HOSPITAL | Age: 73
End: 2023-06-29
Payer: MEDICARE

## 2023-06-29 PROCEDURE — 97110 THERAPEUTIC EXERCISES: CPT

## 2023-07-06 RX ORDER — DIPHENHYDRAMINE HCL 25 MG
25 CAPSULE ORAL NIGHTLY PRN
Qty: 10 CAPSULE | Refills: 0 | Status: SHIPPED | OUTPATIENT
Start: 2023-07-06

## 2023-07-06 RX ORDER — ACETAMINOPHEN 500 MG
500 TABLET ORAL EVERY 6 HOURS PRN
Qty: 20 TABLET | Refills: 0 | Status: SHIPPED | OUTPATIENT
Start: 2023-07-06

## 2023-07-06 NOTE — TELEPHONE ENCOUNTER
Rx request for Acetaminophen and Banophen, please review and sign off if appropriate.      Last seen: 6/27/23  Last refill: Acetaminophen 6/27/23 #40 with 0 refills and Banophen #20 with 0 refills

## 2023-07-07 ENCOUNTER — HOSPITAL ENCOUNTER (INPATIENT)
Facility: HOSPITAL | Age: 73
LOS: 2 days | Discharge: HOME | End: 2023-07-09
Attending: EMERGENCY MEDICINE | Admitting: HOSPITALIST
Payer: MEDICARE

## 2023-07-07 ENCOUNTER — APPOINTMENT (OUTPATIENT)
Dept: CT IMAGING | Facility: HOSPITAL | Age: 73
End: 2023-07-07
Attending: EMERGENCY MEDICINE
Payer: MEDICARE

## 2023-07-07 DIAGNOSIS — K56.609 SMALL BOWEL OBSTRUCTION (HCC): Primary | ICD-10-CM

## 2023-07-07 LAB
ALBUMIN SERPL-MCNC: 3.6 G/DL (ref 3.4–5)
ALP LIVER SERPL-CCNC: 120 U/L
ALT SERPL-CCNC: 17 U/L
ANION GAP SERPL CALC-SCNC: 7 MMOL/L (ref 0–18)
AST SERPL-CCNC: 28 U/L (ref 15–37)
BASOPHILS # BLD AUTO: 0.05 X10(3) UL (ref 0–0.2)
BASOPHILS NFR BLD AUTO: 0.5 %
BILIRUB DIRECT SERPL-MCNC: <0.1 MG/DL (ref 0–0.2)
BILIRUB SERPL-MCNC: 0.4 MG/DL (ref 0.1–2)
BILIRUB UR QL: NEGATIVE
BUN BLD-MCNC: 19 MG/DL (ref 7–18)
BUN/CREAT SERPL: 18.8 (ref 10–20)
CALCIUM BLD-MCNC: 9.4 MG/DL (ref 8.5–10.1)
CHLORIDE SERPL-SCNC: 107 MMOL/L (ref 98–112)
CLARITY UR: CLEAR
CO2 SERPL-SCNC: 23 MMOL/L (ref 21–32)
COLOR UR: COLORLESS
CREAT BLD-MCNC: 1.01 MG/DL
DEPRECATED RDW RBC AUTO: 43.1 FL (ref 35.1–46.3)
EOSINOPHIL # BLD AUTO: 0.2 X10(3) UL (ref 0–0.7)
EOSINOPHIL NFR BLD AUTO: 1.9 %
ERYTHROCYTE [DISTWIDTH] IN BLOOD BY AUTOMATED COUNT: 13 % (ref 11–15)
GFR SERPLBLD BASED ON 1.73 SQ M-ARVRAT: 58 ML/MIN/1.73M2 (ref 60–?)
GLUCOSE BLD-MCNC: 161 MG/DL (ref 70–99)
GLUCOSE BLDC GLUCOMTR-MCNC: 231 MG/DL (ref 70–99)
GLUCOSE UR-MCNC: 50 MG/DL
HCT VFR BLD AUTO: 37.4 %
HGB BLD-MCNC: 12.1 G/DL
HGB UR QL STRIP.AUTO: NEGATIVE
IMM GRANULOCYTES # BLD AUTO: 0.04 X10(3) UL (ref 0–1)
IMM GRANULOCYTES NFR BLD: 0.4 %
KETONES UR-MCNC: NEGATIVE MG/DL
LEUKOCYTE ESTERASE UR QL STRIP.AUTO: NEGATIVE
LIPASE SERPL-CCNC: 19 U/L (ref 13–75)
LYMPHOCYTES # BLD AUTO: 2.24 X10(3) UL (ref 1–4)
LYMPHOCYTES NFR BLD AUTO: 21.1 %
MCH RBC QN AUTO: 29.5 PG (ref 26–34)
MCHC RBC AUTO-ENTMCNC: 32.4 G/DL (ref 31–37)
MCV RBC AUTO: 91.2 FL
MONOCYTES # BLD AUTO: 0.73 X10(3) UL (ref 0.1–1)
MONOCYTES NFR BLD AUTO: 6.9 %
NEUTROPHILS # BLD AUTO: 7.36 X10 (3) UL (ref 1.5–7.7)
NEUTROPHILS # BLD AUTO: 7.36 X10(3) UL (ref 1.5–7.7)
NEUTROPHILS NFR BLD AUTO: 69.2 %
NITRITE UR QL STRIP.AUTO: NEGATIVE
OSMOLALITY SERPL CALC.SUM OF ELEC: 290 MOSM/KG (ref 275–295)
PH UR: 6 [PH] (ref 5–8)
PLATELET # BLD AUTO: 209 10(3)UL (ref 150–450)
POTASSIUM SERPL-SCNC: 4.7 MMOL/L (ref 3.5–5.1)
PROT SERPL-MCNC: 7.7 G/DL (ref 6.4–8.2)
PROT UR-MCNC: NEGATIVE MG/DL
RBC # BLD AUTO: 4.1 X10(6)UL
SODIUM SERPL-SCNC: 137 MMOL/L (ref 136–145)
SP GR UR STRIP: 1.01 (ref 1–1.03)
TROPONIN I HIGH SENSITIVITY: 12 NG/L
UROBILINOGEN UR STRIP-ACNC: NORMAL
WBC # BLD AUTO: 10.6 X10(3) UL (ref 4–11)

## 2023-07-07 PROCEDURE — 99223 1ST HOSP IP/OBS HIGH 75: CPT | Performed by: HOSPITALIST

## 2023-07-07 PROCEDURE — 99222 1ST HOSP IP/OBS MODERATE 55: CPT | Performed by: SURGERY

## 2023-07-07 PROCEDURE — 74177 CT ABD & PELVIS W/CONTRAST: CPT | Performed by: EMERGENCY MEDICINE

## 2023-07-07 RX ORDER — MORPHINE SULFATE 2 MG/ML
2 INJECTION, SOLUTION INTRAMUSCULAR; INTRAVENOUS EVERY 2 HOUR PRN
Status: DISCONTINUED | OUTPATIENT
Start: 2023-07-07 | End: 2023-07-09

## 2023-07-07 RX ORDER — NICOTINE POLACRILEX 4 MG
30 LOZENGE BUCCAL
Status: DISCONTINUED | OUTPATIENT
Start: 2023-07-07 | End: 2023-07-09

## 2023-07-07 RX ORDER — DEXTROSE MONOHYDRATE, SODIUM CHLORIDE, AND POTASSIUM CHLORIDE 50; 1.49; 4.5 G/1000ML; G/1000ML; G/1000ML
INJECTION, SOLUTION INTRAVENOUS CONTINUOUS
Status: DISCONTINUED | OUTPATIENT
Start: 2023-07-07 | End: 2023-07-08

## 2023-07-07 RX ORDER — ONDANSETRON 2 MG/ML
4 INJECTION INTRAMUSCULAR; INTRAVENOUS EVERY 6 HOURS PRN
Status: DISCONTINUED | OUTPATIENT
Start: 2023-07-07 | End: 2023-07-09

## 2023-07-07 RX ORDER — MORPHINE SULFATE 2 MG/ML
1 INJECTION, SOLUTION INTRAMUSCULAR; INTRAVENOUS EVERY 2 HOUR PRN
Status: DISCONTINUED | OUTPATIENT
Start: 2023-07-07 | End: 2023-07-09

## 2023-07-07 RX ORDER — MORPHINE SULFATE 4 MG/ML
4 INJECTION, SOLUTION INTRAMUSCULAR; INTRAVENOUS ONCE
Status: COMPLETED | OUTPATIENT
Start: 2023-07-07 | End: 2023-07-07

## 2023-07-07 RX ORDER — MAGNESIUM HYDROXIDE/ALUMINUM HYDROXICE/SIMETHICONE 120; 1200; 1200 MG/30ML; MG/30ML; MG/30ML
30 SUSPENSION ORAL ONCE
Status: DISCONTINUED | OUTPATIENT
Start: 2023-07-07 | End: 2023-07-09

## 2023-07-07 RX ORDER — DEXTROSE MONOHYDRATE 25 G/50ML
50 INJECTION, SOLUTION INTRAVENOUS
Status: DISCONTINUED | OUTPATIENT
Start: 2023-07-07 | End: 2023-07-09

## 2023-07-07 RX ORDER — METOCLOPRAMIDE HYDROCHLORIDE 5 MG/ML
10 INJECTION INTRAMUSCULAR; INTRAVENOUS ONCE
Status: COMPLETED | OUTPATIENT
Start: 2023-07-07 | End: 2023-07-07

## 2023-07-07 RX ORDER — MORPHINE SULFATE 4 MG/ML
4 INJECTION, SOLUTION INTRAMUSCULAR; INTRAVENOUS EVERY 2 HOUR PRN
Status: DISCONTINUED | OUTPATIENT
Start: 2023-07-07 | End: 2023-07-09

## 2023-07-07 RX ORDER — ONDANSETRON 2 MG/ML
4 INJECTION INTRAMUSCULAR; INTRAVENOUS ONCE
Status: COMPLETED | OUTPATIENT
Start: 2023-07-07 | End: 2023-07-07

## 2023-07-07 RX ORDER — NICOTINE POLACRILEX 4 MG
15 LOZENGE BUCCAL
Status: DISCONTINUED | OUTPATIENT
Start: 2023-07-07 | End: 2023-07-09

## 2023-07-07 RX ORDER — FAMOTIDINE 10 MG/ML
20 INJECTION, SOLUTION INTRAVENOUS ONCE
Status: COMPLETED | OUTPATIENT
Start: 2023-07-07 | End: 2023-07-07

## 2023-07-07 RX ORDER — MIDAZOLAM HYDROCHLORIDE 1 MG/ML
1 INJECTION INTRAMUSCULAR; INTRAVENOUS ONCE
Status: COMPLETED | OUTPATIENT
Start: 2023-07-07 | End: 2023-07-07

## 2023-07-07 RX ORDER — METOCLOPRAMIDE HYDROCHLORIDE 5 MG/ML
10 INJECTION INTRAMUSCULAR; INTRAVENOUS EVERY 8 HOURS PRN
Status: CANCELLED | OUTPATIENT
Start: 2023-07-07

## 2023-07-07 RX ORDER — ACETAMINOPHEN 500 MG
500 TABLET ORAL EVERY 4 HOURS PRN
Status: DISCONTINUED | OUTPATIENT
Start: 2023-07-07 | End: 2023-07-09

## 2023-07-07 NOTE — ED INITIAL ASSESSMENT (HPI)
PT to ED via wheelchair, reports nausea x 1 day due to eating peanuts last night, reports constipation x 1 day.

## 2023-07-07 NOTE — ED QUICK NOTES
Patient reports abdominal pain, nausea, and vomiting that began today. Patient reports she has not tried any medication for symptoms. Denies fever.

## 2023-07-08 LAB
ANION GAP SERPL CALC-SCNC: 7 MMOL/L (ref 0–18)
ATRIAL RATE: 68 BPM
BASOPHILS # BLD AUTO: 0.02 X10(3) UL (ref 0–0.2)
BASOPHILS NFR BLD AUTO: 0.2 %
BUN BLD-MCNC: 13 MG/DL (ref 7–18)
BUN/CREAT SERPL: 12.9 (ref 10–20)
CALCIUM BLD-MCNC: 8.9 MG/DL (ref 8.5–10.1)
CHLORIDE SERPL-SCNC: 104 MMOL/L (ref 98–112)
CO2 SERPL-SCNC: 27 MMOL/L (ref 21–32)
CREAT BLD-MCNC: 1.01 MG/DL
DEPRECATED RDW RBC AUTO: 43.6 FL (ref 35.1–46.3)
EOSINOPHIL # BLD AUTO: 0.09 X10(3) UL (ref 0–0.7)
EOSINOPHIL NFR BLD AUTO: 0.9 %
ERYTHROCYTE [DISTWIDTH] IN BLOOD BY AUTOMATED COUNT: 13.1 % (ref 11–15)
GFR SERPLBLD BASED ON 1.73 SQ M-ARVRAT: 58 ML/MIN/1.73M2 (ref 60–?)
GLUCOSE BLD-MCNC: 253 MG/DL (ref 70–99)
GLUCOSE BLDC GLUCOMTR-MCNC: 178 MG/DL (ref 70–99)
GLUCOSE BLDC GLUCOMTR-MCNC: 189 MG/DL (ref 70–99)
GLUCOSE BLDC GLUCOMTR-MCNC: 228 MG/DL (ref 70–99)
GLUCOSE BLDC GLUCOMTR-MCNC: 234 MG/DL (ref 70–99)
GLUCOSE BLDC GLUCOMTR-MCNC: 273 MG/DL (ref 70–99)
HCT VFR BLD AUTO: 37.9 %
HGB BLD-MCNC: 12.3 G/DL
IMM GRANULOCYTES # BLD AUTO: 0.02 X10(3) UL (ref 0–1)
IMM GRANULOCYTES NFR BLD: 0.2 %
LYMPHOCYTES # BLD AUTO: 2.28 X10(3) UL (ref 1–4)
LYMPHOCYTES NFR BLD AUTO: 23.6 %
MCH RBC QN AUTO: 29.7 PG (ref 26–34)
MCHC RBC AUTO-ENTMCNC: 32.5 G/DL (ref 31–37)
MCV RBC AUTO: 91.5 FL
MONOCYTES # BLD AUTO: 0.8 X10(3) UL (ref 0.1–1)
MONOCYTES NFR BLD AUTO: 8.3 %
NEUTROPHILS # BLD AUTO: 6.45 X10 (3) UL (ref 1.5–7.7)
NEUTROPHILS # BLD AUTO: 6.45 X10(3) UL (ref 1.5–7.7)
NEUTROPHILS NFR BLD AUTO: 66.8 %
OSMOLALITY SERPL CALC.SUM OF ELEC: 295 MOSM/KG (ref 275–295)
P AXIS: 58 DEGREES
P-R INTERVAL: 208 MS
PLATELET # BLD AUTO: 212 10(3)UL (ref 150–450)
POTASSIUM SERPL-SCNC: 4.6 MMOL/L (ref 3.5–5.1)
Q-T INTERVAL: 420 MS
QRS DURATION: 78 MS
QTC CALCULATION (BEZET): 446 MS
R AXIS: 36 DEGREES
RBC # BLD AUTO: 4.14 X10(6)UL
SODIUM SERPL-SCNC: 138 MMOL/L (ref 136–145)
T AXIS: 67 DEGREES
VENTRICULAR RATE: 68 BPM
WBC # BLD AUTO: 9.7 X10(3) UL (ref 4–11)

## 2023-07-08 PROCEDURE — 99233 SBSQ HOSP IP/OBS HIGH 50: CPT | Performed by: HOSPITALIST

## 2023-07-08 PROCEDURE — 99233 SBSQ HOSP IP/OBS HIGH 50: CPT | Performed by: SURGERY

## 2023-07-08 RX ORDER — MONTELUKAST SODIUM 10 MG/1
10 TABLET ORAL NIGHTLY
Status: DISCONTINUED | OUTPATIENT
Start: 2023-07-08 | End: 2023-07-09

## 2023-07-08 RX ORDER — PANTOPRAZOLE SODIUM 40 MG/1
40 TABLET, DELAYED RELEASE ORAL
Status: DISCONTINUED | OUTPATIENT
Start: 2023-07-09 | End: 2023-07-09

## 2023-07-08 RX ORDER — GLIPIZIDE 10 MG/1
10 TABLET ORAL
Status: DISCONTINUED | OUTPATIENT
Start: 2023-07-08 | End: 2023-07-09

## 2023-07-08 RX ORDER — GLIPIZIDE 10 MG/1
10 TABLET ORAL 2 TIMES DAILY WITH MEALS
Status: CANCELLED | OUTPATIENT
Start: 2023-07-08

## 2023-07-08 RX ORDER — SODIUM CHLORIDE 9 MG/ML
INJECTION, SOLUTION INTRAVENOUS CONTINUOUS
Status: DISCONTINUED | OUTPATIENT
Start: 2023-07-08 | End: 2023-07-09

## 2023-07-08 RX ORDER — DIPHENHYDRAMINE HCL 25 MG
25 CAPSULE ORAL NIGHTLY PRN
Status: DISCONTINUED | OUTPATIENT
Start: 2023-07-08 | End: 2023-07-09

## 2023-07-08 RX ORDER — ATORVASTATIN CALCIUM 20 MG/1
20 TABLET, FILM COATED ORAL NIGHTLY
Status: DISCONTINUED | OUTPATIENT
Start: 2023-07-08 | End: 2023-07-09

## 2023-07-08 RX ORDER — LOSARTAN POTASSIUM 25 MG/1
25 TABLET ORAL DAILY
Status: DISCONTINUED | OUTPATIENT
Start: 2023-07-08 | End: 2023-07-09

## 2023-07-08 RX ORDER — HYDROXYZINE HYDROCHLORIDE 25 MG/1
25 TABLET, FILM COATED ORAL EVERY 4 HOURS PRN
Status: DISCONTINUED | OUTPATIENT
Start: 2023-07-08 | End: 2023-07-09

## 2023-07-08 RX ORDER — CALCIUM CARBONATE-CHOLECALCIFEROL TAB 250 MG-125 UNIT 250-125 MG-UNIT
1 TAB ORAL 2 TIMES DAILY WITH MEALS
Status: DISCONTINUED | OUTPATIENT
Start: 2023-07-08 | End: 2023-07-09

## 2023-07-08 RX ORDER — ASPIRIN 81 MG/1
81 TABLET ORAL DAILY
Status: DISCONTINUED | OUTPATIENT
Start: 2023-07-08 | End: 2023-07-09

## 2023-07-08 RX ORDER — PREGABALIN 75 MG/1
300 CAPSULE ORAL NIGHTLY
Status: DISCONTINUED | OUTPATIENT
Start: 2023-07-08 | End: 2023-07-09

## 2023-07-08 NOTE — PLAN OF CARE
From home. Up self ad jessica, gait steady. Abdomen soft. No NG present ( unable to tolerate insertion overnight). Had large amount of BM today and she told Dr Sana Colbert. She is tolerating sips clears without nausea. Requested her diabetic medications to be restarted; stated she is allergic to insulin. Passing gas and had a couple BMs  Own Januvia from home labeled and in cabinet. Problem: Patient Centered Care  Goal: Patient preferences are identified and integrated in the patient's plan of care  Description: Interventions:  - What would you like us to know as we care for you?  From home   - Provide timely, complete, and accurate information to patient/family  - Incorporate patient and family knowledge, values, beliefs, and cultural backgrounds into the planning and delivery of care  - Encourage patient/family to participate in care and decision-making at the level they choose  - Honor patient and family perspectives and choices  Outcome: Progressing     Problem: Patient/Family Goals  Goal: Patient/Family Long Term Goal  Description: Patient's Long Term Goal: return home    Interventions:  - SW for discharge planning  - See additional Care Plan goals for specific interventions  Outcome: Progressing  Goal: Patient/Family Short Term Goal  Description: Patient's Short Term Goal: Tolerate PO intake    Interventions:   -  Sips clear liquids started  - See additional Care Plan goals for specific interventions  Outcome: Progressing     Problem: GASTROINTESTINAL - ADULT  Goal: Minimal or absence of nausea and vomiting  Description: INTERVENTIONS:  - Maintain adequate hydration with IV or PO as ordered and tolerated  - Nasogastric tube to low intermittent suction as ordered  - Evaluate effectiveness of ordered antiemetic medications  - Provide nonpharmacologic comfort measures as appropriate  - Advance diet as tolerated, if ordered  - Obtain nutritional consult as needed  - Evaluate fluid balance  Outcome: Progressing  Goal: Maintains or returns to baseline bowel function  Description: INTERVENTIONS:  - Assess bowel function  - Maintain adequate hydration with IV or PO as ordered and tolerated  - Evaluate effectiveness of GI medications  - Encourage mobilization and activity  - Obtain nutritional consult as needed  - Establish a toileting routine/schedule  - Consider collaborating with pharmacy to review patient's medication profile  Outcome: Progressing     Problem: METABOLIC/FLUID AND ELECTROLYTES - ADULT  Goal: Glucose maintained within prescribed range  Description: INTERVENTIONS:  - Monitor Blood Glucose as ordered  - Assess for signs and symptoms of hyperglycemia and hypoglycemia  - Administer ordered medications to maintain glucose within target range  - Assess barriers to adequate nutritional intake and initiate nutrition consult as needed  - Instruct patient on self management of diabetes  Outcome: Progressing     Problem: SKIN/TISSUE INTEGRITY - ADULT  Goal: Oral mucous membranes remain intact  Description: INTERVENTIONS  - Assess oral mucosa and hygiene practices  - Implement preventative oral hygiene regimen  - Implement oral medicated treatments as ordered  Outcome: Progressing     Problem: Diabetes/Glucose Control  Goal: Glucose maintained within prescribed range  Description: INTERVENTIONS:  - Monitor Blood Glucose as ordered  - Assess for signs and symptoms of hyperglycemia and hypoglycemia  - Administer ordered medications to maintain glucose within target range  - Assess barriers to adequate nutritional intake and initiate nutrition consult as needed  - Instruct patient on self management of diabetes  Outcome: Progressing

## 2023-07-08 NOTE — ED QUICK NOTES
Endorsed care from previous nurse. Patient resting in bed, call light within reach. All needs met at this time.

## 2023-07-08 NOTE — ED QUICK NOTES
Orders for admission, patient is aware of plan and ready to go upstairs. Any questions, please call ED RN Mello Nath at extension [phone].      Patient Covid vaccination status: Unvaccinated     COVID Test Ordered in ED: None    COVID Suspicion at Admission: N/A    Running Infusions:  None    Mental Status/LOC at time of transport: a/o x 4    Other pertinent information:   CIWA score: N/A   NIH score:  N/A

## 2023-07-08 NOTE — ED QUICK NOTES
Patient agreeable to NG after speaking to hospitalist. Attempted to place NG multiple times, was unsuccessful and patient no longer agreeable to NG at this time. Floor RN made aware.

## 2023-07-08 NOTE — H&P
[facility]    PATIENT'S NAME: Yvonna Gowers   ATTENDING PHYSICIAN: Scott Galindo MD   PATIENT ACCOUNT#:   [id number]    LOCATION:  [facility]  [facility] 1  MEDICAL RECORD #:   [id number]       YOB: 1950  ADMISSION DATE:       2023    HISTORY AND PHYSICAL EXAMINATION    CHIEF COMPLAINT:  Small-bowel obstruction. HISTORY OF PRESENT ILLNESS:  Patient is a 64-year-old  female who presented to the emergency department for evaluation of nausea, vomiting, abdominal distension since this morning. CBC and chemistry unremarkable. GFR is 58 which is slightly below her baseline. Urinalysis, troponin, lipase and liver function tests were unremarkable. CT scan of the abdomen showed small-bowel obstruction with transition point in the right lower quadrant of the distal ileum. Marked dilation of the ileum up to 5.6 cm. PAST MEDICAL HISTORY:  Diabetes mellitus type 2; hypertension; hyperlipidemia; osteoarthritis; gastroesophageal reflux disease; coronary artery disease status post PCI stent to the obtuse marginal and circumflex in 2023, currently on dual antiplatelet therapy. PAST SURGICAL HISTORY:  Bilateral total knee arthroplasties and remote hysterectomy. She had multiple small-bowel obstruction presentation to the hospital, treated conservatively. MEDICATIONS:  Please see medication reconciliation list.      ALLERGIES:  Penicillin. FAMILY HISTORY:  Mother had breast cancer. Father  of cancer. SOCIAL HISTORY:  Chronic tobacco use. No alcohol or drug use. Lives with her family. Independent for basic activities of daily living. REVIEW OF SYSTEMS:  Patient reports abdominal distension associated with nausea, vomiting projectile type since this morning. Crescendo-decrescendo abdominal pain. Other 12-point review of systems is negative. PHYSICAL EXAMINATION:    GENERAL:  Alert and oriented to time, place, and person.   Moderate distress. VITAL SIGNS:  Temperature 97.1, pulse 68, respiratory rate 16, blood pressure 156/68, pulse ox 99% on room air. HEENT:  Atraumatic. Oropharynx clear. Dry mucous membranes. Normal hard and soft palate. Eyes:  Anicteric sclerae. NECK:  Supple. No lymphadenopathy. Trachea midline. Full range of motion. LUNGS:  Clear to auscultation bilaterally. Normal respiratory effort. HEART:  Regular rate, rhythm. S1 and S2 auscultated. No murmur. ABDOMEN:  Moderate distension. Hypoactive bowel sounds. Discomfort to palpation. No tenderness. EXTREMITIES:  No peripheral edema, clubbing, or cyanosis. NEUROLOGIC:  Motor and sensory intact. Cranial nerves II through XII are intact. ASSESSMENT AND PLAN:    1. Small-bowel obstruction, most likely related to adhesions. 2.   Diabetes mellitus type 2.  3.   Hypertension. Patient will be admitted to general medical floor. NG tube. Pain and nausea control. General surgery and cardiology consult. It has been over 6 months since her PCI and it is probably safe to hold her dual antiplatelet therapy in anticipation of possible surgical intervention plus patient is currently n.p.o. status. Further recommendations to follow.      Dictated By Helena La MD  d: 07/07/2023 20:08:59  t: 07/07/2023 20:10:56  Job [id number]  FB/

## 2023-07-08 NOTE — PROGRESS NOTES
General surgery consult    Chart reviewed, dictated consult to follow    #sbo  Npo, ng, ivf  -repeat 3 view in am

## 2023-07-08 NOTE — PLAN OF CARE
Pt admitted from the ED for SBO. ED attempted to place NG tube pt did not tolerate well and refused for the NG tube to be inserted. Pain is controlled with IVP morphine. IVF running as ordered. Pt alert and oriented on RA. Ambulating independently. Pt is NPO.     Problem: GASTROINTESTINAL - ADULT  Goal: Minimal or absence of nausea and vomiting  Description: INTERVENTIONS:  - Maintain adequate hydration with IV or PO as ordered and tolerated  - Nasogastric tube to low intermittent suction as ordered  - Evaluate effectiveness of ordered antiemetic medications  - Provide nonpharmacologic comfort measures as appropriate  - Advance diet as tolerated, if ordered  - Obtain nutritional consult as needed  - Evaluate fluid balance  Outcome: Progressing  Goal: Maintains or returns to baseline bowel function  Description: INTERVENTIONS:  - Assess bowel function  - Maintain adequate hydration with IV or PO as ordered and tolerated  - Evaluate effectiveness of GI medications  - Encourage mobilization and activity  - Obtain nutritional consult as needed  - Establish a toileting routine/schedule  - Consider collaborating with pharmacy to review patient's medication profile  Outcome: Progressing     Problem: SKIN/TISSUE INTEGRITY - ADULT  Goal: Oral mucous membranes remain intact  Description: INTERVENTIONS  - Assess oral mucosa and hygiene practices  - Implement preventative oral hygiene regimen  - Implement oral medicated treatments as ordered  Outcome: Progressing

## 2023-07-08 NOTE — CONSULTS
[facility]    PATIENT'S NAME: Holden Lau PHYSICIAN: Laura Styles MD   CONSULTING PHYSICIAN: Johana Crain MD   PATIENT ACCOUNT#:   [id number]    LOCATION:  [address] #:   [id number]       YOB: 1950  ADMISSION DATE:       07/07/2023      CONSULT DATE:  07/07/2023    REPORT OF CONSULTATION      REASON FOR CONSULTATION:  Small-bowel obstruction. HISTORY OF PRESENT ILLNESS:  Patient is a very pleasant 75-year-old with a history of bowel obstructions treated conservatively in the past.  She is admitted with nausea, vomiting, and abdominal distension. CT scan demonstrates a small-bowel obstruction with transition point in the right lower quadrant. PAST MEDICAL HISTORY:  Diabetes; hypertension; osteoarthritis; reflux; coronary artery disease, PCI stent, on dual anti-platelet therapy. PAST SURGICAL HISTORY:  Knee replacements, hysterectomy. MEDICATIONS:  Please see reconciliation. ALLERGIES:  Penicillin. SOCIAL HISTORY:  Chronic smoker. Denies alcohol or drug use. FAMILY HISTORY:  Breast cancer. REVIEW OF SYSTEMS:  Significant for abdominal distension, nausea. Otherwise, 12-point system unremarkable. PHYSICAL EXAMINATION:    GENERAL:  She is alert and oriented. Appears uncomfortable, but in no distress. HEENT:  Her sclerae are nonicteric. NECK:  Supple without lymphadenopathy. Trachea midline. Full range of motion. LUNGS:  Clear to auscultation. No rhonchi or wheezing. HEART:  Regular rate and rhythm. No murmur, rubs, or gallops. ABDOMEN:  Soft, slightly distended. No diffuse peritonitis or guarding. EXTREMITIES:  Without clubbing, cyanosis, or edema. LABORATORY DATA:  White count normal.  Glucose 253. CT reviewed. IMPRESSION:  Small-bowel obstruction. PLAN:  Conservative treatment. N.p.o., NG tube if she agrees. IV fluid resuscitation. Will follow clinically.     I thank you for this consultation.     Dictated By Placido Chicas MD  d: 07/08/2023 08:49:11  t: 07/08/2023 09:09:30  Job [id number]  SALVADOR/

## 2023-07-09 VITALS
RESPIRATION RATE: 18 BRPM | TEMPERATURE: 98 F | HEIGHT: 66 IN | HEART RATE: 74 BPM | BODY MASS INDEX: 28.61 KG/M2 | SYSTOLIC BLOOD PRESSURE: 137 MMHG | WEIGHT: 178 LBS | OXYGEN SATURATION: 100 % | DIASTOLIC BLOOD PRESSURE: 55 MMHG

## 2023-07-09 LAB
ANION GAP SERPL CALC-SCNC: 7 MMOL/L (ref 0–18)
BUN BLD-MCNC: 13 MG/DL (ref 7–18)
BUN/CREAT SERPL: 14.4 (ref 10–20)
CALCIUM BLD-MCNC: 8.6 MG/DL (ref 8.5–10.1)
CHLORIDE SERPL-SCNC: 110 MMOL/L (ref 98–112)
CO2 SERPL-SCNC: 25 MMOL/L (ref 21–32)
CREAT BLD-MCNC: 0.9 MG/DL
DEPRECATED RDW RBC AUTO: 42.8 FL (ref 35.1–46.3)
ERYTHROCYTE [DISTWIDTH] IN BLOOD BY AUTOMATED COUNT: 13.2 % (ref 11–15)
GFR SERPLBLD BASED ON 1.73 SQ M-ARVRAT: 67 ML/MIN/1.73M2 (ref 60–?)
GLUCOSE BLD-MCNC: 99 MG/DL (ref 70–99)
GLUCOSE BLDC GLUCOMTR-MCNC: 113 MG/DL (ref 70–99)
GLUCOSE BLDC GLUCOMTR-MCNC: 172 MG/DL (ref 70–99)
HCT VFR BLD AUTO: 34.1 %
HGB BLD-MCNC: 11.3 G/DL
MCH RBC QN AUTO: 29.6 PG (ref 26–34)
MCHC RBC AUTO-ENTMCNC: 33.1 G/DL (ref 31–37)
MCV RBC AUTO: 89.3 FL
OSMOLALITY SERPL CALC.SUM OF ELEC: 294 MOSM/KG (ref 275–295)
PLATELET # BLD AUTO: 200 10(3)UL (ref 150–450)
POTASSIUM SERPL-SCNC: 3.9 MMOL/L (ref 3.5–5.1)
RBC # BLD AUTO: 3.82 X10(6)UL
SODIUM SERPL-SCNC: 142 MMOL/L (ref 136–145)
WBC # BLD AUTO: 5.9 X10(3) UL (ref 4–11)

## 2023-07-09 PROCEDURE — 99232 SBSQ HOSP IP/OBS MODERATE 35: CPT | Performed by: SURGERY

## 2023-07-09 PROCEDURE — 99239 HOSP IP/OBS DSCHRG MGMT >30: CPT | Performed by: HOSPITALIST

## 2023-07-09 NOTE — PLAN OF CARE
Problem: Patient Centered Care  Goal: Patient preferences are identified and integrated in the patient's plan of care  Description: Interventions:  - What would you like us to know as we care for you?   - Provide timely, complete, and accurate information to patient/family  - Incorporate patient and family knowledge, values, beliefs, and cultural backgrounds into the planning and delivery of care  - Encourage patient/family to participate in care and decision-making at the level they choose  - Honor patient and family perspectives and choices  Outcome: Progressing     Problem: Patient/Family Goals  Goal: Patient/Family Long Term Goal  Description: Patient's Long Term Goal:     Interventions:  -   - See additional Care Plan goals for specific interventions  Outcome: Progressing  Goal: Patient/Family Short Term Goal  Description: Patient's Short Term Goal:     Interventions:   -   - See additional Care Plan goals for specific interventions  Outcome: Progressing     Problem: GASTROINTESTINAL - ADULT  Goal: Minimal or absence of nausea and vomiting  Description: INTERVENTIONS:  - Maintain adequate hydration with IV or PO as ordered and tolerated  - Nasogastric tube to low intermittent suction as ordered  - Evaluate effectiveness of ordered antiemetic medications  - Provide nonpharmacologic comfort measures as appropriate  - Advance diet as tolerated, if ordered  - Obtain nutritional consult as needed  - Evaluate fluid balance  Outcome: Progressing  Goal: Maintains or returns to baseline bowel function  Description: INTERVENTIONS:  - Assess bowel function  - Maintain adequate hydration with IV or PO as ordered and tolerated  - Evaluate effectiveness of GI medications  - Encourage mobilization and activity  - Obtain nutritional consult as needed  - Establish a toileting routine/schedule  - Consider collaborating with pharmacy to review patient's medication profile  Outcome: Progressing     Problem: METABOLIC/FLUID AND ELECTROLYTES - ADULT  Goal: Glucose maintained within prescribed range  Description: INTERVENTIONS:  - Monitor Blood Glucose as ordered  - Assess for signs and symptoms of hyperglycemia and hypoglycemia  - Administer ordered medications to maintain glucose within target range  - Assess barriers to adequate nutritional intake and initiate nutrition consult as needed  - Instruct patient on self management of diabetes  Outcome: Progressing     Problem: SKIN/TISSUE INTEGRITY - ADULT  Goal: Oral mucous membranes remain intact  Description: INTERVENTIONS  - Assess oral mucosa and hygiene practices  - Implement preventative oral hygiene regimen  - Implement oral medicated treatments as ordered  Outcome: Progressing     Problem: Diabetes/Glucose Control  Goal: Glucose maintained within prescribed range  Description: INTERVENTIONS:  - Monitor Blood Glucose as ordered  - Assess for signs and symptoms of hyperglycemia and hypoglycemia  - Administer ordered medications to maintain glucose within target range  - Assess barriers to adequate nutritional intake and initiate nutrition consult as needed  - Instruct patient on self management of diabetes  Outcome: Progressing  Patient is doing well. Vital signs stable. Denies abdominal pain, nausea and vomiting. Patient had bowel movements, passing flatus and belching. Voids freely. Tolerating clear liquid diet. Blood sugar monitored. IV fluids infusing. Ambulates with standby assist. Safety measures in place. Plan of care reviewed.

## 2023-07-09 NOTE — DISCHARGE SUMMARY
[facility]    Discharge Summary    Mabel Guerrero Patient Status:  Inpatient    1950 MRN [id number]   Location [facility] 4W/SW/SE Attending Brenda Mccracken MD   Hosp Day # 2 PCP Fortunato Ochoa MD     Date of Admission: 2023   Date of Discharge: 2023    Admitting Diagnosis: Small bowel obstruction [facility]) [B55.205]    Disposition: [facility] Discharge Diagnoses: Acute Small Bowel Obstruction    Lace+ Score: 51  59-90 High Risk  29-58 Medium Risk  0-28   Low Risk. TCM Follow-Up Recommendation:  LACE 29-58: Moderate Risk of readmission after discharge from the hospital.        Discharge Diagnosis: . Principal Problem:    Small bowel obstruction [facility])      Hospital Course:   Reason for Admission:   Per Dr. Angie Johnson    Patient is a 68-year-old  female who presented to the emergency department for evaluation of nausea, vomiting, abdominal distension since this morning. CBC and chemistry unremarkable. GFR is 58 which is slightly below her baseline. Urinalysis, troponin, lipase and liver function tests were unremarkable. CT scan of the abdomen showed small-bowel obstruction with transition point in the right lower quadrant of the distal ileum. Marked dilation of the ileum up to 5.6 cm. Discharge Physical Exam:   Physical Exam:    General: No acute distress. Respiratory: Clear to auscultation bilaterally. No wheezes. No rhonchi. Cardiovascular: S1, S2. Regular rate and rhythm. No murmurs, rubs or gallops. Abdomen: Soft, nontender, nondistended. Positive bowel sounds. No rebound or guarding. Neurologic: No focal neurological deficits. Musculoskeletal: Moves all extremities.     Hospital Course:    Small bowel obstruction (HCC)  - tolerated low fiber soft diet  -stable for discharge today      DM2  - Patient states she is allergic to insulin  - restart her home januvia and glipizide      History of CAD with stent  - brilinta and aspirin   - Cards recs appreciated      HTN  -continue home meds            Quality:  DVT Prophylaxis: SCDs  CODE status:Full      Complications: none    Consultants         Provider   Role Specialty     Luis Salazar MD  Consulting Physician Elizabeth Paige MD  Consulting Physician Interventional, Cardiology                Discharge Plan:   Discharge Condition: Stable    Current Discharge Medication List    Home Meds - Unchanged    acetaminophen (ACETAMINOPHEN EXTRA STRENGTH) 500 MG Oral Tab  Take 1 tablet (500 mg total) by mouth every 6 (six) hours as needed for Pain. !! diphenhydrAMINE (BANOPHEN) 25 MG Oral Cap  Take 1 capsule (25 mg total) by mouth nightly as needed for Sleep. aspirin 81 MG Oral Tab EC  Take 1 tablet (81 mg total) by mouth 2 (two) times daily with meals. Take with breakfast and dinner for 1 month. Then resume aspirin 81 mg once daily. calcium carbonate-vitamin D 250-3. 125 MG-MCG Oral Tab  Take 1 tablet by mouth 2 (two) times daily with meals. multivitamin with minerals Oral Tab  Take 1 tablet by mouth daily. ticagrelor (BRILINTA) 90 MG Oral Tab  Take 1 tablet (90 mg total) by mouth every 12 (twelve) hours. atorvastatin 20 MG Oral Tab  Take 1 tablet (20 mg total) by mouth nightly. JANUVIA 100 MG Oral Tab  Take 1 tablet (100 mg total) by mouth daily. alprazolam 0.25 MG Oral Tab  Take 1 tablet (0.25 mg total) by mouth 4 (four) times daily as needed. glipiZIDE 5 MG Oral Tab  Take 2 tablets (10 mg total) by mouth 2 (two) times daily before meals. losartan 25 MG Oral Tab  Take 1 tablet (25 mg total) by mouth daily. LYRICA 300 MG Oral Cap  Take 1 capsule (300 mg total) by mouth nightly. Acetaminophen 500 MG Oral Cap  Take 1-2 capsules (500-1,000 mg total) by mouth every 12 (twelve) hours as needed for Pain. Do not take more than 4000 mg of acetaminophen from all sources in 24 hours.     pantoprazole 40 MG Oral Tab EC      !! diphenhydrAMINE 25 MG Oral Cap  Take 1 capsule (25 mg total) by mouth every 6 (six) hours as needed for Itching.    hydrOXYzine 25 MG Oral Tab  Take 1-2 tablets (25-50 mg total) by mouth every 4 (four) hours as needed for Itching. docusate sodium 100 MG Oral Cap  Take 1 capsule by mouth daily. montelukast 10 MG Oral Tab  Take 1 tablet (10 mg total) by mouth nightly. !! - Potential duplicate medications found. Please discuss with provider. Discharge Diet: ADA diet     Discharge Activity: As tolerated       Discharge Medications        CONTINUE taking these medications        Instructions Prescription details   Acetaminophen 500 MG Caps      Take 1-2 capsules (500-1,000 mg total) by mouth every 12 (twelve) hours as needed for Pain. Do not take more than 4000 mg of acetaminophen from all sources in 24 hours. Quantity: 40 capsule  Refills: 0     acetaminophen 500 MG Tabs  Commonly known as: Acetaminophen Extra Strength      Take 1 tablet (500 mg total) by mouth every 6 (six) hours as needed for Pain. Quantity: 20 tablet  Refills: 0     ALPRAZolam 0.25 MG Tabs  Commonly known as: Xanax      Take 1 tablet (0.25 mg total) by mouth 4 (four) times daily as needed. Refills: 5     aspirin 81 MG Tbec      Take 1 tablet (81 mg total) by mouth 2 (two) times daily with meals. Take with breakfast and dinner for 1 month. Then resume aspirin 81 mg once daily. Quantity: 60 tablet  Refills: 0     atorvastatin 20 MG Tabs  Commonly known as: Lipitor      Take 1 tablet (20 mg total) by mouth nightly. Quantity: 30 tablet  Refills: 5     calcium carbonate-vitamin D 250-3. 125 MG-MCG Tabs  Commonly known as: Oyster Shell-D      Take 1 tablet by mouth 2 (two) times daily with meals. Quantity: 60 tablet  Refills: 0     diphenhydrAMINE 25 MG Caps  Commonly known as: Benadryl      Take 1 capsule (25 mg total) by mouth every 6 (six) hours as needed for Itching.    Quantity: 20 capsule  Refills: 0     diphenhydrAMINE 25 MG Caps  Commonly known as: Banophen      Take 1 capsule (25 mg total) by mouth nightly as needed for Sleep. Quantity: 10 capsule  Refills: 0     docusate sodium 100 MG Caps  Commonly known as: COLACE      Take 1 capsule by mouth daily. Refills: 0     glipiZIDE 5 MG Tabs  Commonly known as: Glucotrol      Take 2 tablets (10 mg total) by mouth 2 (two) times daily before meals. Refills: 2     hydrOXYzine 25 MG Tabs  Commonly known as: Atarax      Take 1-2 tablets (25-50 mg total) by mouth every 4 (four) hours as needed for Itching. Quantity: 120 tablet  Refills: 0     Januvia 100 MG Tabs  Generic drug: SITagliptin Phosphate      Take 1 tablet (100 mg total) by mouth daily. Refills: 0     losartan 25 MG Tabs  Commonly known as: Cozaar      Take 1 tablet (25 mg total) by mouth daily. Refills: 0     Lyrica 300 MG Caps  Generic drug: pregabalin      Take 1 capsule (300 mg total) by mouth nightly. Refills: 3     montelukast 10 MG Tabs  Commonly known as: Singulair      Take 1 tablet (10 mg total) by mouth nightly. Quantity: 30 tablet  Refills: 3     multivitamin with minerals Tabs      Take 1 tablet by mouth daily. Quantity: 30 tablet  Refills: 0     pantoprazole 40 MG Tbec  Commonly known as: Protonix       Refills: 0     ticagrelor 90 MG Tabs  Commonly known as: Brilinta      Take 1 tablet (90 mg total) by mouth every 12 (twelve) hours. Quantity: 60 tablet  Refills: 3              Follow up: Follow-up Information       Reji Muñoz MD Follow up in 1 week(s).     Specialty: Family Medicine  Contact information:  [address] [address]  [phone]                             Follow up Labs and imaging:         Time spent:  > 30 minutes    Sammie Hobson MD  7/9/2023

## 2023-07-10 ENCOUNTER — LAB ENCOUNTER (OUTPATIENT)
Dept: LAB | Age: 73
End: 2023-07-10
Attending: INTERNAL MEDICINE
Payer: MEDICARE

## 2023-07-10 DIAGNOSIS — Z79.4 TYPE 2 DIABETES MELLITUS WITHOUT COMPLICATION, WITH LONG-TERM CURRENT USE OF INSULIN (HCC): Primary | ICD-10-CM

## 2023-07-10 DIAGNOSIS — Z01.818 PRE-OP EXAM: ICD-10-CM

## 2023-07-10 DIAGNOSIS — E11.9 TYPE 2 DIABETES MELLITUS WITHOUT COMPLICATION, WITH LONG-TERM CURRENT USE OF INSULIN (HCC): Primary | ICD-10-CM

## 2023-07-10 DIAGNOSIS — E78.00 PURE HYPERCHOLESTEROLEMIA: ICD-10-CM

## 2023-07-10 LAB
CREAT UR-SCNC: 216 MG/DL
EST. AVERAGE GLUCOSE BLD GHB EST-MCNC: 180 MG/DL (ref 68–126)
HBA1C MFR BLD: 7.9 % (ref ?–5.7)
MICROALBUMIN UR-MCNC: 3.06 MG/DL
MICROALBUMIN/CREAT 24H UR-RTO: 14.2 UG/MG (ref ?–30)

## 2023-07-10 PROCEDURE — 36415 COLL VENOUS BLD VENIPUNCTURE: CPT

## 2023-07-10 PROCEDURE — 83036 HEMOGLOBIN GLYCOSYLATED A1C: CPT

## 2023-07-10 PROCEDURE — 80061 LIPID PANEL: CPT

## 2023-07-10 PROCEDURE — 80053 COMPREHEN METABOLIC PANEL: CPT

## 2023-07-10 PROCEDURE — 82043 UR ALBUMIN QUANTITATIVE: CPT

## 2023-07-10 PROCEDURE — 82570 ASSAY OF URINE CREATININE: CPT

## 2023-07-11 LAB
ALBUMIN SERPL-MCNC: 3.4 G/DL (ref 3.4–5)
ALBUMIN/GLOB SERPL: 0.8 {RATIO} (ref 1–2)
ALP LIVER SERPL-CCNC: 103 U/L
ALT SERPL-CCNC: 14 U/L
ANION GAP SERPL CALC-SCNC: 12 MMOL/L (ref 0–18)
AST SERPL-CCNC: 11 U/L (ref 15–37)
BILIRUB SERPL-MCNC: 0.4 MG/DL (ref 0.1–2)
BUN BLD-MCNC: 21 MG/DL (ref 7–18)
BUN/CREAT SERPL: 15.3 (ref 10–20)
CALCIUM BLD-MCNC: 9 MG/DL (ref 8.5–10.1)
CHLORIDE SERPL-SCNC: 109 MMOL/L (ref 98–112)
CHOLEST SERPL-MCNC: 137 MG/DL (ref ?–200)
CO2 SERPL-SCNC: 22 MMOL/L (ref 21–32)
CREAT BLD-MCNC: 1.37 MG/DL
FASTING PATIENT LIPID ANSWER: YES
FASTING STATUS PATIENT QL REPORTED: YES
GFR SERPLBLD BASED ON 1.73 SQ M-ARVRAT: 41 ML/MIN/1.73M2 (ref 60–?)
GLOBULIN PLAS-MCNC: 4.1 G/DL (ref 2.8–4.4)
GLUCOSE BLD-MCNC: 199 MG/DL (ref 70–99)
HDLC SERPL-MCNC: 55 MG/DL (ref 40–59)
LDLC SERPL CALC-MCNC: 66 MG/DL (ref ?–100)
NONHDLC SERPL-MCNC: 82 MG/DL (ref ?–130)
OSMOLALITY SERPL CALC.SUM OF ELEC: 305 MOSM/KG (ref 275–295)
POTASSIUM SERPL-SCNC: 4 MMOL/L (ref 3.5–5.1)
PROT SERPL-MCNC: 7.5 G/DL (ref 6.4–8.2)
SODIUM SERPL-SCNC: 143 MMOL/L (ref 136–145)
TRIGL SERPL-MCNC: 83 MG/DL (ref 30–149)
VLDLC SERPL CALC-MCNC: 12 MG/DL (ref 0–30)

## 2023-08-29 ENCOUNTER — OFFICE VISIT (OUTPATIENT)
Dept: ORTHOPEDICS CLINIC | Facility: CLINIC | Age: 73
End: 2023-08-29

## 2023-08-29 ENCOUNTER — HOSPITAL ENCOUNTER (OUTPATIENT)
Dept: GENERAL RADIOLOGY | Facility: HOSPITAL | Age: 73
Discharge: HOME | End: 2023-08-29
Payer: MEDICARE

## 2023-08-29 DIAGNOSIS — Z47.89 ORTHOPEDIC AFTERCARE: ICD-10-CM

## 2023-08-29 DIAGNOSIS — T84.84XD PAIN DUE TO TOTAL LEFT KNEE REPLACEMENT, SUBSEQUENT ENCOUNTER: ICD-10-CM

## 2023-08-29 DIAGNOSIS — M17.11 PRIMARY OSTEOARTHRITIS OF RIGHT KNEE: Primary | ICD-10-CM

## 2023-08-29 DIAGNOSIS — Z96.651 S/P TKR (TOTAL KNEE REPLACEMENT) USING CEMENT, RIGHT: ICD-10-CM

## 2023-08-29 DIAGNOSIS — Z96.652 PAIN DUE TO TOTAL LEFT KNEE REPLACEMENT, SUBSEQUENT ENCOUNTER: ICD-10-CM

## 2023-08-29 PROCEDURE — 73562 X-RAY EXAM OF KNEE 3: CPT

## 2023-08-29 PROCEDURE — 99213 OFFICE O/P EST LOW 20 MIN: CPT

## 2023-08-29 RX ORDER — ACETAMINOPHEN 500 MG
500 TABLET ORAL NIGHTLY PRN
Qty: 20 TABLET | Refills: 0 | Status: SHIPPED | OUTPATIENT
Start: 2023-08-29

## 2023-08-29 RX ORDER — DIPHENHYDRAMINE HCL 25 MG
25 CAPSULE ORAL NIGHTLY PRN
Qty: 10 CAPSULE | Refills: 0 | Status: SHIPPED | OUTPATIENT
Start: 2023-08-29

## 2023-11-20 NOTE — PROGRESS NOTES
Per verbal order from Dr. Kenna Sheth, draw up 5ml of 0.5% Marcaine and 1ml of Kenalog 40 for cortisone injection to right kneee. Enio So RN  Patient provided education handout for cortisone injection.    Patient left office prior to obtaining post injectio 98 independent/needs device

## 2024-01-04 RX ORDER — LORATADINE 10 MG/1
10 TABLET ORAL DAILY
Qty: 30 TABLET | Refills: 0 | Status: SHIPPED | OUTPATIENT
Start: 2024-01-04

## 2024-01-23 ENCOUNTER — LAB ENCOUNTER (OUTPATIENT)
Dept: LAB | Age: 74
End: 2024-01-23
Attending: INTERNAL MEDICINE
Payer: MEDICARE

## 2024-01-23 DIAGNOSIS — E78.2 MIXED HYPERLIPIDEMIA: ICD-10-CM

## 2024-01-23 DIAGNOSIS — E11.9 DM TYPE 2 (DIABETES MELLITUS, TYPE 2) (HCC): Primary | ICD-10-CM

## 2024-01-23 LAB
ALBUMIN SERPL-MCNC: 3.9 G/DL (ref 3.2–4.8)
ALBUMIN/GLOB SERPL: 1.3 {RATIO} (ref 1–2)
ALP LIVER SERPL-CCNC: 147 U/L
ALT SERPL-CCNC: 10 U/L
ANION GAP SERPL CALC-SCNC: 8 MMOL/L (ref 0–18)
AST SERPL-CCNC: 10 U/L (ref ?–34)
BILIRUB SERPL-MCNC: 0.4 MG/DL (ref 0.2–1.1)
BUN BLD-MCNC: 25 MG/DL (ref 9–23)
BUN/CREAT SERPL: 20.7 (ref 10–20)
CALCIUM BLD-MCNC: 9.4 MG/DL (ref 8.7–10.4)
CHLORIDE SERPL-SCNC: 109 MMOL/L (ref 98–112)
CHOLEST SERPL-MCNC: 164 MG/DL (ref ?–200)
CO2 SERPL-SCNC: 24 MMOL/L (ref 21–32)
CREAT BLD-MCNC: 1.21 MG/DL
EGFRCR SERPLBLD CKD-EPI 2021: 47 ML/MIN/1.73M2 (ref 60–?)
EST. AVERAGE GLUCOSE BLD GHB EST-MCNC: 235 MG/DL (ref 68–126)
FASTING PATIENT LIPID ANSWER: YES
FASTING STATUS PATIENT QL REPORTED: YES
GLOBULIN PLAS-MCNC: 2.9 G/DL (ref 2.8–4.4)
GLUCOSE BLD-MCNC: 240 MG/DL (ref 70–99)
HBA1C MFR BLD: 9.8 % (ref ?–5.7)
HDLC SERPL-MCNC: 47 MG/DL (ref 40–59)
LDLC SERPL CALC-MCNC: 95 MG/DL (ref ?–100)
NONHDLC SERPL-MCNC: 117 MG/DL (ref ?–130)
OSMOLALITY SERPL CALC.SUM OF ELEC: 304 MOSM/KG (ref 275–295)
POTASSIUM SERPL-SCNC: 4.2 MMOL/L (ref 3.5–5.1)
PROT SERPL-MCNC: 6.8 G/DL (ref 5.7–8.2)
SODIUM SERPL-SCNC: 141 MMOL/L (ref 136–145)
TRIGL SERPL-MCNC: 125 MG/DL (ref 30–149)
VLDLC SERPL CALC-MCNC: 20 MG/DL (ref 0–30)

## 2024-01-23 PROCEDURE — 80053 COMPREHEN METABOLIC PANEL: CPT

## 2024-01-23 PROCEDURE — 36415 COLL VENOUS BLD VENIPUNCTURE: CPT

## 2024-01-23 PROCEDURE — 80061 LIPID PANEL: CPT

## 2024-01-23 PROCEDURE — 83036 HEMOGLOBIN GLYCOSYLATED A1C: CPT

## 2024-01-30 RX ORDER — LORATADINE 10 MG/1
10 TABLET ORAL DAILY
Qty: 30 TABLET | Refills: 0 | Status: SHIPPED | OUTPATIENT
Start: 2024-01-30

## 2024-02-28 NOTE — TELEPHONE ENCOUNTER
This refill request is being sent to the provider for the following reason:  [x]Patient has not had an appointment within the past 12 months: last office visit 1/31/2023  []Medication is not within protocol  []Patient did not complete follow up recommendations  []Other: ___

## 2024-03-04 RX ORDER — LORATADINE 10 MG/1
10 TABLET ORAL DAILY
Qty: 30 TABLET | Refills: 0 | OUTPATIENT
Start: 2024-03-04

## 2024-03-12 ENCOUNTER — OFFICE VISIT (OUTPATIENT)
Dept: OTOLARYNGOLOGY | Facility: CLINIC | Age: 74
End: 2024-03-12

## 2024-03-12 VITALS — HEIGHT: 66 IN | TEMPERATURE: 98 F | WEIGHT: 178 LBS | BODY MASS INDEX: 28.61 KG/M2

## 2024-03-12 DIAGNOSIS — J30.9 ALLERGIC RHINITIS, UNSPECIFIED SEASONALITY, UNSPECIFIED TRIGGER: Primary | ICD-10-CM

## 2024-03-12 DIAGNOSIS — H61.22 IMPACTED CERUMEN OF LEFT EAR: ICD-10-CM

## 2024-03-12 PROCEDURE — 69210 REMOVE IMPACTED EAR WAX UNI: CPT | Performed by: OTOLARYNGOLOGY

## 2024-03-12 PROCEDURE — 99213 OFFICE O/P EST LOW 20 MIN: CPT | Performed by: OTOLARYNGOLOGY

## 2024-03-12 RX ORDER — LORATADINE 10 MG/1
10 TABLET ORAL DAILY
Qty: 90 TABLET | Refills: 3 | Status: SHIPPED | OUTPATIENT
Start: 2024-03-12

## 2024-03-12 NOTE — PROGRESS NOTES
Nina Baker is a 73 year old female.    Chief Complaint   Patient presents with    Follow - Up     Allergic Rhinitis f/u, medication refill.       HISTORY OF PRESENT ILLNESS  I have seen her in the past for halitosis which appears to be secondary to postnasal discharge and allergic rhinitis. She has responded well in the past to antibiotics and allergy medications. She been doing well up until the last few weeks when she started noticing a foul sense of smell when she would cough or breathe heavily. No nasal mucopurulence but she does notice postnasal drainage and some cough at times. No other signs, symptoms or complaints   11/15/16 In the past she develops halitosis when she is developing an acute sinus infection. She has responded well to antibiotics and allergy medications. She does not tolerate Flonase. She's noted some ringing in her ears bilaterally for the last 6 months. This is high pitched and usually occurs primarily when she is trying to sleep in a quiet setting. No other signs, symptoms or complaints.  8/16/18 doing very well on Claritin daily.  No more halitosis no more tinnitus overall feels very good using this medication with respect to her sinuses.     8/27/19 doing well.  But 2 weeks ago she started developing some sinus issues.  Usually she develops halitosis and over the past few days she notes a slight sensation of bad odor in her nose and mouth.  Not using any medications at this time.     8/11/2020 she presents with a one-month history of having drainage from her right ear.  Seen by her primary care physician using Cortisporin eardrops for the past month.  Halitosis present on the face of loratadine.  No other signs, symptoms or complaints      9/17/21 she is use loratadine and Singulair and nasal sprays in the past for halitosis and other sensations of bad odor in her nose and mouth.  She has been able to get down to simply using loratadine daily and states that she has had complete  resolution of all her symptoms and she is very happy with the results of her treatment.  No other signs, symptoms or complaints        9/29/22 Long history of chronic halitosis and nasal congestion.  She has been using allergy meds for quite some time in the last year has noted significant improvement in her symptoms and now only using loratadine with complete control of her nasal congestive issues and halitosis.  Very happy with the results of her use of medications.  She will require a knee replacement on the right in the near future.      1/31/23 Long history of congestive issues.  Does not tolerate nasal sprays currently on loratadine in the mornings.  Has had a thumping sound in the right ear over the left ear for the past 5 months unclear as to the etiology.     3/12/24 she has been doing quite well for the last year.  Only using loratadine at this time.  No longer using montelukast or nasal sprays.  Does feel a fullness in her left ear chews gum all day denies grinding or clenching behavior.  Wishes to have her ears cleaned of cerumen impaction.      Social History     Socioeconomic History    Marital status:    Tobacco Use    Smoking status: Every Day     Years: 50     Types: Cigarettes    Smokeless tobacco: Never   Vaping Use    Vaping Use: Never used   Substance and Sexual Activity    Alcohol use: No     Alcohol/week: 0.0 standard drinks of alcohol    Drug use: No   Other Topics Concern    Caffeine Concern Yes     Comment: coffee       Family History   Problem Relation Age of Onset    Cancer Father     Cancer Mother     Breast Cancer Mother        Past Medical History:   Diagnosis Date    Arthritis     Chronic sinusitis 2013    halitosis    Coronary atherosclerosis     Diabetes (HCC)     Esophageal reflux     Essential hypertension     High blood pressure     High cholesterol     Hyperlipidemia     Osteoarthritis     Visual impairment     glasses       Past Surgical History:   Procedure Laterality  Date    HYSTERECTOMY      KNEE REPLACEMENT SURGERY Left          REVIEW OF SYSTEMS    System Neg/Pos Details   Constitutional Negative Fatigue, fever and weight loss.   ENMT Negative Drooling.   Eyes Negative Blurred vision and vision changes.   Respiratory Negative Dyspnea and wheezing.   Cardio Negative Chest pain, irregular heartbeat/palpitations and syncope.   GI Negative Abdominal pain and diarrhea.   Endocrine Negative Cold intolerance and heat intolerance.   Neuro Negative Tremors.   Psych Negative Anxiety and depression.   Integumentary Negative Frequent skin infections, pigment change and rash.   Hema/Lymph Negative Easy bleeding and easy bruising.           PHYSICAL EXAM    Temp 98.1 °F (36.7 °C) (Tympanic)   Ht 5' 6\" (1.676 m)   Wt 178 lb (80.7 kg)   BMI 28.73 kg/m²        Constitutional Normal Overall appearance - Normal.   Psychiatric Normal Orientation - Oriented to time, place, person & situation. Appropriate mood and affect.   Neck Exam Normal Inspection - Normal. Palpation - Normal. Parotid gland - Normal. Thyroid gland - Normal.   Eyes Normal Conjunctiva - Right: Normal, Left: Normal. Pupil - Right: Normal, Left: Normal. Fundus - Right: Normal, Left: Normal.   Neurological Normal Memory - Normal. Cranial nerves - Cranial nerves II through XII grossly intact.   Head/Face Normal Facial features - Normal. Eyebrows - Normal. Skull - Normal.        Nasopharynx Normal External nose - Normal. Lips/teeth/gums - Normal. Tonsils - Normal. Oropharynx - Normal.   Ears Normal Inspection - Right: Normal, Left: Normal. Canal - Right: Normal, Left: Normal. TM - Right: Normal, Left: Normal.   Skin Normal Inspection - Normal.        Lymph Detail Normal Submental. Submandibular. Anterior cervical. Posterior cervical. Supraclavicular.        Nose/Mouth/Throat Normal External nose - Normal. Lips/teeth/gums - Normal. Tonsils - Normal. Oropharynx - Normal.   Nose/Mouth/Throat Normal Nares - Right: Normal Left: Normal.  Septum -Normal  Turbinates - Right: Normal, Left: Normal.   Microscopy  Binocular microscopy was performed. The affected ear(s) was/were examined and all debris removed using suction. The findings are described in the physical exam.   Ears cleaned of cerumen impaction bilaterally    Current Outpatient Medications:     LORATADINE 10 MG Oral Tab, TAKE 1 TABLET BY MOUTH DAILY, Disp: 30 tablet, Rfl: 0    acetaminophen 500 MG Oral Tab, Take 1 tablet (500 mg total) by mouth nightly as needed for Pain., Disp: 20 tablet, Rfl: 0    diphenhydrAMINE (BANOPHEN) 25 MG Oral Cap, Take 1 capsule (25 mg total) by mouth nightly as needed for Sleep., Disp: 10 capsule, Rfl: 0    Acetaminophen 500 MG Oral Cap, Take 1-2 capsules (500-1,000 mg total) by mouth every 12 (twelve) hours as needed for Pain. Do not take more than 4000 mg of acetaminophen from all sources in 24 hours., Disp: 40 capsule, Rfl: 0    pantoprazole 40 MG Oral Tab EC, , Disp: , Rfl:     diphenhydrAMINE 25 MG Oral Cap, Take 1 capsule (25 mg total) by mouth every 6 (six) hours as needed for Itching., Disp: 20 capsule, Rfl: 0    aspirin 81 MG Oral Tab EC, Take 1 tablet (81 mg total) by mouth 2 (two) times daily with meals. Take with breakfast and dinner for 1 month.  Then resume aspirin 81 mg once daily., Disp: 60 tablet, Rfl: 0    calcium carbonate-vitamin D 250-3.125 MG-MCG Oral Tab, Take 1 tablet by mouth 2 (two) times daily with meals., Disp: 60 tablet, Rfl: 0    multivitamin with minerals Oral Tab, Take 1 tablet by mouth daily., Disp: 30 tablet, Rfl: 0    docusate sodium 100 MG Oral Cap, Take 1 capsule by mouth daily., Disp: , Rfl:     montelukast 10 MG Oral Tab, Take 1 tablet (10 mg total) by mouth nightly., Disp: 30 tablet, Rfl: 3    ticagrelor (BRILINTA) 90 MG Oral Tab, Take 1 tablet (90 mg total) by mouth every 12 (twelve) hours., Disp: 60 tablet, Rfl: 3    atorvastatin 20 MG Oral Tab, Take 1 tablet (20 mg total) by mouth nightly., Disp: 30 tablet, Rfl: 5     JANUVIA 100 MG Oral Tab, Take 1 tablet (100 mg total) by mouth daily., Disp: , Rfl:     alprazolam 0.25 MG Oral Tab, Take 1 tablet (0.25 mg total) by mouth 4 (four) times daily as needed., Disp: , Rfl: 5    glipiZIDE 5 MG Oral Tab, Take 2 tablets (10 mg total) by mouth 2 (two) times daily before meals., Disp: , Rfl: 2    losartan 25 MG Oral Tab, Take 1 tablet (25 mg total) by mouth daily., Disp: , Rfl: 0    LYRICA 300 MG Oral Cap, Take 1 capsule (300 mg total) by mouth nightly., Disp: , Rfl: 3  ASSESSMENT AND PLAN    1. Allergic rhinitis, unspecified seasonality, unspecified trigger    2. Impacted cerumen of left ear  Cerumen removed from both ears.  Doing very well with her allergies only using loratadine daily.  No longer using montelukast or sprays.  Continue loratadine for now wean as tolerated return to see me in 1 year for refills or sooner for any new problems.  Ear issues appear to be more musculoskeletal as she chews a lot of gum        This note was prepared using Dragon [employer] voice recognition dictation software. As a result errors may occur. When identified these errors have been corrected. While every attempt is made to correct errors during dictation discrepancies may still exist    Fredi Ayers MD    3/12/2024    12:17 PM

## 2024-04-09 ENCOUNTER — TELEPHONE (OUTPATIENT)
Dept: ORTHOPEDICS CLINIC | Facility: CLINIC | Age: 74
End: 2024-04-09

## 2024-04-09 NOTE — TELEPHONE ENCOUNTER
Lvm for patient advising him the appointment on 4/15 was rescheduled to 4/30 at 11:40. If appointment does not work I advised patient to call back and reschedule.

## 2024-04-16 ENCOUNTER — LAB ENCOUNTER (OUTPATIENT)
Dept: LAB | Facility: HOSPITAL | Age: 74
End: 2024-04-16
Attending: INTERNAL MEDICINE
Payer: MEDICARE

## 2024-04-16 DIAGNOSIS — E78.5 HYPERLIPEMIA: ICD-10-CM

## 2024-04-16 DIAGNOSIS — I25.10 CORONARY ATHEROSCLEROSIS OF NATIVE CORONARY ARTERY: Primary | ICD-10-CM

## 2024-04-16 LAB
ALBUMIN SERPL-MCNC: 4.1 G/DL (ref 3.2–4.8)
ALBUMIN/GLOB SERPL: 1.3 {RATIO} (ref 1–2)
ALP LIVER SERPL-CCNC: 156 U/L
ALT SERPL-CCNC: 19 U/L
ANION GAP SERPL CALC-SCNC: 2 MMOL/L (ref 0–18)
AST SERPL-CCNC: 20 U/L (ref ?–34)
BILIRUB SERPL-MCNC: 0.4 MG/DL (ref 0.2–1.1)
BUN BLD-MCNC: 23 MG/DL (ref 9–23)
BUN/CREAT SERPL: 18.5 (ref 10–20)
CALCIUM BLD-MCNC: 9.5 MG/DL (ref 8.7–10.4)
CHLORIDE SERPL-SCNC: 109 MMOL/L (ref 98–112)
CHOLEST SERPL-MCNC: 154 MG/DL (ref ?–200)
CK SERPL-CCNC: 84 U/L
CO2 SERPL-SCNC: 26 MMOL/L (ref 21–32)
CREAT BLD-MCNC: 1.24 MG/DL
EGFRCR SERPLBLD CKD-EPI 2021: 45 ML/MIN/1.73M2 (ref 60–?)
FASTING PATIENT LIPID ANSWER: YES
FASTING STATUS PATIENT QL REPORTED: YES
GLOBULIN PLAS-MCNC: 3.1 G/DL (ref 2.8–4.4)
GLUCOSE BLD-MCNC: 224 MG/DL (ref 70–99)
HDLC SERPL-MCNC: 48 MG/DL (ref 40–59)
LDLC SERPL CALC-MCNC: 86 MG/DL (ref ?–100)
NONHDLC SERPL-MCNC: 106 MG/DL (ref ?–130)
OSMOLALITY SERPL CALC.SUM OF ELEC: 295 MOSM/KG (ref 275–295)
POTASSIUM SERPL-SCNC: 4.1 MMOL/L (ref 3.5–5.1)
PROT SERPL-MCNC: 7.2 G/DL (ref 5.7–8.2)
SODIUM SERPL-SCNC: 137 MMOL/L (ref 136–145)
TRIGL SERPL-MCNC: 112 MG/DL (ref 30–149)
VLDLC SERPL CALC-MCNC: 18 MG/DL (ref 0–30)

## 2024-04-16 PROCEDURE — 80061 LIPID PANEL: CPT

## 2024-04-16 PROCEDURE — 80053 COMPREHEN METABOLIC PANEL: CPT

## 2024-04-16 PROCEDURE — 36415 COLL VENOUS BLD VENIPUNCTURE: CPT

## 2024-04-16 PROCEDURE — 82550 ASSAY OF CK (CPK): CPT

## 2024-04-30 ENCOUNTER — HOSPITAL ENCOUNTER (OUTPATIENT)
Dept: GENERAL RADIOLOGY | Facility: HOSPITAL | Age: 74
Discharge: HOME | End: 2024-04-30
Attending: ORTHOPAEDIC SURGERY
Payer: MEDICARE

## 2024-04-30 ENCOUNTER — OFFICE VISIT (OUTPATIENT)
Dept: ORTHOPEDICS CLINIC | Facility: CLINIC | Age: 74
End: 2024-04-30

## 2024-04-30 DIAGNOSIS — Z96.651 S/P TKR (TOTAL KNEE REPLACEMENT) USING CEMENT, RIGHT: Primary | ICD-10-CM

## 2024-04-30 DIAGNOSIS — M17.11 PRIMARY OSTEOARTHRITIS OF RIGHT KNEE: ICD-10-CM

## 2024-04-30 DIAGNOSIS — Z47.89 ORTHOPEDIC AFTERCARE: ICD-10-CM

## 2024-04-30 PROCEDURE — 73562 X-RAY EXAM OF KNEE 3: CPT | Performed by: ORTHOPAEDIC SURGERY

## 2024-04-30 PROCEDURE — 99213 OFFICE O/P EST LOW 20 MIN: CPT

## 2024-04-30 NOTE — PROGRESS NOTES
NURSING INTAKE COMMENTS:   Chief Complaint   Patient presents with    Follow - Up     1 year follow up Right TKA done on 4/20. Patient denies any pain at this time.        HPI: This 73 year old female presents today approximately 1 year status post right total knee replacement.  Patient states her knee is \"great and Dr. Rivera did a great job\".  Patient is very happy with her right knee replacement.  She reports that she is back to walking and doing her normal daily activities.  She denies any numbness, tingling, or calf pain today.    The patient also had a left knee replacement by Dr. Melo in 2013 and a revision in 2015.  She had intermittent pain on and off, and had questionable loosening to the medial tibial stem.  At the last visit, the patient was not interested in any CT scan to confirm loosening.  At today's visit, she reports that her pain in her left knee is better and she still does not want a CT scan to confirm loosening.  She reports she is \"done with knee surgeries\".      Past Medical History:    Arthritis    Chronic sinusitis    halitosis    Coronary atherosclerosis    Diabetes (HCC)    Esophageal reflux    Essential hypertension    High blood pressure    High cholesterol    Hyperlipidemia    Osteoarthritis    Visual impairment    glasses     Past Surgical History:   Procedure Laterality Date    Hysterectomy      Knee replacement surgery Left      Current Outpatient Medications   Medication Sig Dispense Refill    loratadine 10 MG Oral Tab Take 1 tablet (10 mg total) by mouth daily. 90 tablet 3    LORATADINE 10 MG Oral Tab TAKE 1 TABLET BY MOUTH DAILY 30 tablet 0    acetaminophen 500 MG Oral Tab Take 1 tablet (500 mg total) by mouth nightly as needed for Pain. 20 tablet 0    diphenhydrAMINE (BANOPHEN) 25 MG Oral Cap Take 1 capsule (25 mg total) by mouth nightly as needed for Sleep. 10 capsule 0    Acetaminophen 500 MG Oral Cap Take 1-2 capsules (500-1,000 mg total) by mouth every 12 (twelve) hours  as needed for Pain. Do not take more than 4000 mg of acetaminophen from all sources in 24 hours. 40 capsule 0    pantoprazole 40 MG Oral Tab EC       diphenhydrAMINE 25 MG Oral Cap Take 1 capsule (25 mg total) by mouth every 6 (six) hours as needed for Itching. 20 capsule 0    aspirin 81 MG Oral Tab EC Take 1 tablet (81 mg total) by mouth 2 (two) times daily with meals. Take with breakfast and dinner for 1 month.  Then resume aspirin 81 mg once daily. 60 tablet 0    calcium carbonate-vitamin D 250-3.125 MG-MCG Oral Tab Take 1 tablet by mouth 2 (two) times daily with meals. 60 tablet 0    multivitamin with minerals Oral Tab Take 1 tablet by mouth daily. 30 tablet 0    docusate sodium 100 MG Oral Cap Take 1 capsule by mouth daily.      montelukast 10 MG Oral Tab Take 1 tablet (10 mg total) by mouth nightly. 30 tablet 3    ticagrelor (BRILINTA) 90 MG Oral Tab Take 1 tablet (90 mg total) by mouth every 12 (twelve) hours. 60 tablet 3    atorvastatin 20 MG Oral Tab Take 1 tablet (20 mg total) by mouth nightly. 30 tablet 5    JANUVIA 100 MG Oral Tab Take 1 tablet (100 mg total) by mouth daily.      alprazolam 0.25 MG Oral Tab Take 1 tablet (0.25 mg total) by mouth 4 (four) times daily as needed.  5    glipiZIDE 5 MG Oral Tab Take 2 tablets (10 mg total) by mouth 2 (two) times daily before meals.  2    losartan 25 MG Oral Tab Take 1 tablet (25 mg total) by mouth daily.  0    LYRICA 300 MG Oral Cap Take 1 capsule (300 mg total) by mouth nightly.  3     Allergies   Allergen Reactions    Insulin Aspart ITCHING     Family History   Problem Relation Age of Onset    Cancer Father     Cancer Mother     Breast Cancer Mother      No family Hx of DVT/PE    Social History     Occupational History    Not on file   Tobacco Use    Smoking status: Every Day     Types: Cigarettes    Smokeless tobacco: Never   Vaping Use    Vaping status: Never Used   Substance and Sexual Activity    Alcohol use: No     Alcohol/week: 0.0 standard drinks of  alcohol    Drug use: No    Sexual activity: Not on file        Review of Systems:  GENERAL: feels generally well, no significant weight loss or weight gain  SKIN: no ulcerated or worrisome skin lesions  EYES:denies blurred vision or double vision  HEENT: denies new nasal congestion, sinus pain or ST  LUNGS: denies shortness of breath  CARDIOVASCULAR: denies chest pain  GI: no hematemesis, no worsening heartburn, no diarrhea  : no dysuria, no blood in urine, no difficulty urinating, no incontinence  MUSCULOSKELETAL: no other musculoskeletal complaints other than in HPI  NEURO: no numbness or tingling, no weakness or balance disorder  PSYCHE: no depression or anxiety  HEMATOLOGIC: no hx of blood dyscrasia, no Hx DVT/PE  ENDOCRINE: no thyroid or diabetes issues  ALL/ASTHMA: no new hx of severe allergy or asthma    Physical Examination:    There were no vitals taken for this visit.  Constitutional: appears well hydrated, alert and responsive, no acute distress noted  Extremities: Lower extremity skin healthy, no pitting edema.  Calf soft nontender.  Musculoskeletal: No asymmetric swelling or warmth to either knee.  Right knee incision healed cosmetically.  Right knee range of motion 0 to 120 degrees today, and no instability.  Left knee range of motion 0 to 110 degrees, no instability.  Patient has no tenderness to lateral medial joint lines at either knee.  Both patellas tracking well.  Patient able to dorsiflex and plantarflex bilateral feet.  Able to do straight leg raise against resistance on both legs.  Neurological: Normal motor and sensory function.    Imaging: X-rays taken in office today show right knee implants are well-fixed and in proper alignment.  From what can be seen on frontal view of the left knee there is questionable loosening to the medial tibial component which has been a chronic finding since 2020.      No results found.     Lab Results   Component Value Date    WBC 5.9 07/09/2023    HGB 11.3  (L) 07/09/2023    .0 07/09/2023      Lab Results   Component Value Date     (H) 04/16/2024    BUN 23 04/16/2024    CREATSERUM 1.24 (H) 04/16/2024    GFRNAA 57 (L) 05/19/2022    GFRAA 65 05/19/2022        Assessment and Plan:  Diagnoses and all orders for this visit:    S/P TKR (total knee replacement) using cement, right    Orthopedic aftercare  -     XR KNEE (3 VIEWS), RIGHT (CPT=73562); Future    Primary osteoarthritis of right knee        Assessment: As above    Plan: Patient is doing very well approximately 1 year status post right total knee replacement.  She is very happy with her replacement and very happy with the care that she received by Dr. Rivera.  Discussed with the patient she should continue doing her exercises and continue walking.  We will see her in 1 year for her 2-year anniversary x-rays.  Again, the patient is not interested in further surgery for her left knee.  Discussed with her if she needs us before then she is more than welcome to visit us earlier.    Follow Up: No follow-ups on file.    PATRICA Vázquez

## 2024-05-21 ENCOUNTER — LAB ENCOUNTER (OUTPATIENT)
Dept: LAB | Facility: HOSPITAL | Age: 74
End: 2024-05-21
Attending: INTERNAL MEDICINE

## 2024-05-21 DIAGNOSIS — I10 ESSENTIAL HYPERTENSION: ICD-10-CM

## 2024-05-21 LAB
ANION GAP SERPL CALC-SCNC: 7 MMOL/L (ref 0–18)
BUN BLD-MCNC: 23 MG/DL (ref 9–23)
BUN/CREAT SERPL: 20.9 (ref 10–20)
CALCIUM BLD-MCNC: 9 MG/DL (ref 8.7–10.4)
CHLORIDE SERPL-SCNC: 108 MMOL/L (ref 98–112)
CO2 SERPL-SCNC: 26 MMOL/L (ref 21–32)
CREAT BLD-MCNC: 1.1 MG/DL
EGFRCR SERPLBLD CKD-EPI 2021: 52 ML/MIN/1.73M2 (ref 60–?)
FASTING STATUS PATIENT QL REPORTED: NO
GLUCOSE BLD-MCNC: 172 MG/DL (ref 70–99)
OSMOLALITY SERPL CALC.SUM OF ELEC: 300 MOSM/KG (ref 275–295)
POTASSIUM SERPL-SCNC: 3.8 MMOL/L (ref 3.5–5.1)
SODIUM SERPL-SCNC: 141 MMOL/L (ref 136–145)

## 2024-05-21 PROCEDURE — 36415 COLL VENOUS BLD VENIPUNCTURE: CPT

## 2024-05-21 PROCEDURE — 80048 BASIC METABOLIC PNL TOTAL CA: CPT

## 2024-06-25 ENCOUNTER — HOSPITAL ENCOUNTER (OUTPATIENT)
Dept: GENERAL RADIOLOGY | Facility: HOSPITAL | Age: 74
Discharge: HOME | End: 2024-06-25
Attending: FAMILY MEDICINE

## 2024-06-25 DIAGNOSIS — M79.673 FOOT PAIN: ICD-10-CM

## 2024-06-25 PROCEDURE — 73630 X-RAY EXAM OF FOOT: CPT | Performed by: FAMILY MEDICINE

## 2024-07-09 ENCOUNTER — LAB ENCOUNTER (OUTPATIENT)
Dept: LAB | Age: 74
End: 2024-07-09
Attending: INTERNAL MEDICINE
Payer: MEDICARE

## 2024-07-09 DIAGNOSIS — E11.9 DM TYPE 2 (DIABETES MELLITUS, TYPE 2) (HCC): Primary | ICD-10-CM

## 2024-07-09 DIAGNOSIS — E78.5 HYPERLIPIDEMIA: ICD-10-CM

## 2024-07-09 LAB
ALBUMIN SERPL-MCNC: 4 G/DL (ref 3.2–4.8)
ALBUMIN/GLOB SERPL: 1.4 {RATIO} (ref 1–2)
ALP LIVER SERPL-CCNC: 113 U/L
ALT SERPL-CCNC: 12 U/L
ANION GAP SERPL CALC-SCNC: 6 MMOL/L (ref 0–18)
AST SERPL-CCNC: 16 U/L (ref ?–34)
BILIRUB SERPL-MCNC: 0.4 MG/DL (ref 0.2–1.1)
BUN BLD-MCNC: 27 MG/DL (ref 9–23)
BUN/CREAT SERPL: 22.1 (ref 10–20)
CALCIUM BLD-MCNC: 9.1 MG/DL (ref 8.7–10.4)
CHLORIDE SERPL-SCNC: 110 MMOL/L (ref 98–112)
CHOLEST SERPL-MCNC: 135 MG/DL (ref ?–200)
CO2 SERPL-SCNC: 26 MMOL/L (ref 21–32)
CREAT BLD-MCNC: 1.22 MG/DL
CREAT UR-SCNC: 137 MG/DL
EGFRCR SERPLBLD CKD-EPI 2021: 46 ML/MIN/1.73M2 (ref 60–?)
EST. AVERAGE GLUCOSE BLD GHB EST-MCNC: 186 MG/DL (ref 68–126)
FASTING PATIENT LIPID ANSWER: YES
FASTING STATUS PATIENT QL REPORTED: YES
GLOBULIN PLAS-MCNC: 2.9 G/DL (ref 2–3.5)
GLUCOSE BLD-MCNC: 145 MG/DL (ref 70–99)
HBA1C MFR BLD: 8.1 % (ref ?–5.7)
HDLC SERPL-MCNC: 39 MG/DL (ref 40–59)
LDLC SERPL CALC-MCNC: 78 MG/DL (ref ?–100)
MICROALBUMIN UR-MCNC: 0.8 MG/DL
MICROALBUMIN/CREAT 24H UR-RTO: 5.8 UG/MG (ref ?–30)
NONHDLC SERPL-MCNC: 96 MG/DL (ref ?–130)
OSMOLALITY SERPL CALC.SUM OF ELEC: 302 MOSM/KG (ref 275–295)
POTASSIUM SERPL-SCNC: 4.2 MMOL/L (ref 3.5–5.1)
PROT SERPL-MCNC: 6.9 G/DL (ref 5.7–8.2)
SODIUM SERPL-SCNC: 142 MMOL/L (ref 136–145)
TRIGL SERPL-MCNC: 95 MG/DL (ref 30–149)
VLDLC SERPL CALC-MCNC: 15 MG/DL (ref 0–30)

## 2024-07-09 PROCEDURE — 83036 HEMOGLOBIN GLYCOSYLATED A1C: CPT

## 2024-07-09 PROCEDURE — 80061 LIPID PANEL: CPT

## 2024-07-09 PROCEDURE — 36415 COLL VENOUS BLD VENIPUNCTURE: CPT

## 2024-07-09 PROCEDURE — 80053 COMPREHEN METABOLIC PANEL: CPT

## 2024-07-09 PROCEDURE — 82043 UR ALBUMIN QUANTITATIVE: CPT

## 2024-07-09 PROCEDURE — 82570 ASSAY OF URINE CREATININE: CPT

## 2024-08-17 DIAGNOSIS — M17.0 BILATERAL PRIMARY OSTEOARTHRITIS OF KNEE: Primary | ICD-10-CM

## 2024-08-19 ENCOUNTER — HOSPITAL ENCOUNTER (OUTPATIENT)
Dept: GENERAL RADIOLOGY | Facility: HOSPITAL | Age: 74
Discharge: HOME | End: 2024-08-19
Attending: FAMILY MEDICINE
Payer: MEDICARE

## 2024-08-19 DIAGNOSIS — M17.0 BILATERAL PRIMARY OSTEOARTHRITIS OF KNEE: ICD-10-CM

## 2024-08-19 PROCEDURE — 73560 X-RAY EXAM OF KNEE 1 OR 2: CPT | Performed by: FAMILY MEDICINE

## 2024-09-10 ENCOUNTER — OFFICE VISIT (OUTPATIENT)
Dept: ORTHOPEDICS CLINIC | Facility: CLINIC | Age: 74
End: 2024-09-10

## 2024-09-10 VITALS — DIASTOLIC BLOOD PRESSURE: 78 MMHG | SYSTOLIC BLOOD PRESSURE: 160 MMHG | HEART RATE: 84 BPM

## 2024-09-10 DIAGNOSIS — M17.11 PRIMARY OSTEOARTHRITIS OF RIGHT KNEE: ICD-10-CM

## 2024-09-10 DIAGNOSIS — Z96.651 S/P TKR (TOTAL KNEE REPLACEMENT) USING CEMENT, RIGHT: ICD-10-CM

## 2024-09-10 DIAGNOSIS — M76.891 PES ANSERINUS TENDINITIS OF RIGHT LOWER EXTREMITY: Primary | ICD-10-CM

## 2024-09-10 PROCEDURE — 99213 OFFICE O/P EST LOW 20 MIN: CPT

## 2024-09-10 PROCEDURE — 20610 DRAIN/INJ JOINT/BURSA W/O US: CPT

## 2024-09-10 RX ORDER — TRIAMCINOLONE ACETONIDE 40 MG/ML
40 INJECTION, SUSPENSION INTRA-ARTICULAR; INTRAMUSCULAR ONCE
Status: COMPLETED | OUTPATIENT
Start: 2024-09-10 | End: 2024-09-10

## 2024-09-10 RX ADMIN — TRIAMCINOLONE ACETONIDE 40 MG: 40 INJECTION, SUSPENSION INTRA-ARTICULAR; INTRAMUSCULAR at 12:50:00

## 2024-09-10 NOTE — PROGRESS NOTES
Per verbal order from Dr. Rivera, draw up 2 ml of 0.5% Marcaine and 1ml of Kenalog 40 for cortisone injection to right knee Krystyna GARZA MA  Patient provided education handout for cortisone injection.

## 2024-09-10 NOTE — PROGRESS NOTES
NURSING INTAKE COMMENTS:   Chief Complaint   Patient presents with    Knee Pain     S/p R TKA sx on 4/20/2023- pain returned 2 mos ago - rates pain 4-9/10 on and off- worse at night       HPI: This 73 year old female presents today for new onset right knee pain.  We last saw the patient in April for her 1 year anniversary x-ray for her right knee replacement.  She has been doing well with her right knee pain.  She reports that she joined the choir and was doing a lot of standing and stairs, which she feel like irritated her knee.  Her pain is worse with stairs.  Patient does note that she does have some pain at times behind her knee as well.  She denies any numbness or tingling.  Denies any injuries or accidents.    Past Medical History:    Arthritis    Chronic sinusitis    halitosis    Coronary atherosclerosis    Diabetes (HCC)    Esophageal reflux    Essential hypertension    High blood pressure    High cholesterol    Hyperlipidemia    Osteoarthritis    Visual impairment    glasses     Past Surgical History:   Procedure Laterality Date    Hysterectomy      Knee replacement surgery Left      Current Outpatient Medications   Medication Sig Dispense Refill    loratadine 10 MG Oral Tab Take 1 tablet (10 mg total) by mouth daily. 90 tablet 3    LORATADINE 10 MG Oral Tab TAKE 1 TABLET BY MOUTH DAILY 30 tablet 0    acetaminophen 500 MG Oral Tab Take 1 tablet (500 mg total) by mouth nightly as needed for Pain. 20 tablet 0    diphenhydrAMINE (BANOPHEN) 25 MG Oral Cap Take 1 capsule (25 mg total) by mouth nightly as needed for Sleep. 10 capsule 0    Acetaminophen 500 MG Oral Cap Take 1-2 capsules (500-1,000 mg total) by mouth every 12 (twelve) hours as needed for Pain. Do not take more than 4000 mg of acetaminophen from all sources in 24 hours. 40 capsule 0    pantoprazole 40 MG Oral Tab EC       diphenhydrAMINE 25 MG Oral Cap Take 1 capsule (25 mg total) by mouth every 6 (six) hours as needed for Itching. 20 capsule 0     aspirin 81 MG Oral Tab EC Take 1 tablet (81 mg total) by mouth 2 (two) times daily with meals. Take with breakfast and dinner for 1 month.  Then resume aspirin 81 mg once daily. 60 tablet 0    calcium carbonate-vitamin D 250-3.125 MG-MCG Oral Tab Take 1 tablet by mouth 2 (two) times daily with meals. 60 tablet 0    multivitamin with minerals Oral Tab Take 1 tablet by mouth daily. 30 tablet 0    docusate sodium 100 MG Oral Cap Take 1 capsule by mouth daily.      montelukast 10 MG Oral Tab Take 1 tablet (10 mg total) by mouth nightly. 30 tablet 3    ticagrelor (BRILINTA) 90 MG Oral Tab Take 1 tablet (90 mg total) by mouth every 12 (twelve) hours. 60 tablet 3    atorvastatin 20 MG Oral Tab Take 1 tablet (20 mg total) by mouth nightly. 30 tablet 5    JANUVIA 100 MG Oral Tab Take 1 tablet (100 mg total) by mouth daily.      alprazolam 0.25 MG Oral Tab Take 1 tablet (0.25 mg total) by mouth 4 (four) times daily as needed.  5    glipiZIDE 5 MG Oral Tab Take 2 tablets (10 mg total) by mouth 2 (two) times daily before meals.  2    losartan 25 MG Oral Tab Take 1 tablet (25 mg total) by mouth daily.  0    LYRICA 300 MG Oral Cap Take 1 capsule (300 mg total) by mouth nightly.  3     Allergies   Allergen Reactions    Insulin Aspart ITCHING     Family History   Problem Relation Age of Onset    Cancer Father     Cancer Mother     Breast Cancer Mother      No family Hx of DVT/PE    Social History     Occupational History    Not on file   Tobacco Use    Smoking status: Every Day     Types: Cigarettes    Smokeless tobacco: Never   Vaping Use    Vaping status: Never Used   Substance and Sexual Activity    Alcohol use: No     Alcohol/week: 0.0 standard drinks of alcohol    Drug use: No    Sexual activity: Not on file        Review of Systems:  GENERAL: feels generally well, no significant weight loss or weight gain  SKIN: no ulcerated or worrisome skin lesions  EYES:denies blurred vision or double vision  HEENT: denies new nasal  congestion, sinus pain or ST  LUNGS: denies shortness of breath  CARDIOVASCULAR: denies chest pain  GI: no hematemesis, no worsening heartburn, no diarrhea  : no dysuria, no blood in urine, no difficulty urinating, no incontinence  MUSCULOSKELETAL: no other musculoskeletal complaints other than in HPI  NEURO: no numbness or tingling, no weakness or balance disorder  PSYCHE: no depression or anxiety  HEMATOLOGIC: no hx of blood dyscrasia, no Hx DVT/PE  ENDOCRINE: no thyroid or diabetes issues  ALL/ASTHMA: no new hx of severe allergy or asthma    Physical Examination:    /78 (BP Location: Right arm, Patient Position: Sitting, Cuff Size: large)   Pulse 84   Constitutional: appears well hydrated, alert and responsive, no acute distress noted  Extremities: Lower extremity skin healthy, no pitting edema.  Calf soft nontender.  Musculoskeletal: Right knee range of motion 0 to 120 degrees, no instability.  No tenderness to medial lateral joint line.  Tenderness to pes bursa.  No tenderness to Tamica's tubercle.  Able to straight leg raise against resistance.  Able to dorsiflex and plantarflex against resistance.  No Baker's cyst palpated.  Neurological: Motor and sensory function intact.    Imaging: X-rays taken on 8/19/2024 show right knee replacement is well-fixed and in proper alignment.      XR KNEE BILAT (NED=99209)    Result Date: 8/19/2024  PROCEDURE: XR KNEE BILAT EM (CPT=73560)  COMPARISON: None.  INDICATIONS: Right anterior and posterior knee pain x2 months. No known injury.  TECHNIQUE: Three-view Findings and impression:  Right knee:  Intact TKA with no evidence of loosening.  Normal alignment with no fracture.  There is a small joint effusion  Left knee:  Intact TKA with long stem femoral and tibial components.  No evidence of loosening.  Normal alignment with no fracture.  No significant joint effusion Finalized by (CST): Thomas Walsh MD on 8/19/2024 at 4:23 PM             Lab Results   Component  Value Date    WBC 5.9 07/09/2023    HGB 11.3 (L) 07/09/2023    .0 07/09/2023      Lab Results   Component Value Date     (H) 07/09/2024    BUN 27 (H) 07/09/2024    CREATSERUM 1.22 (H) 07/09/2024    GFRNAA 57 (L) 05/19/2022    GFRAA 65 05/19/2022        Assessment and Plan:  Diagnoses and all orders for this visit:    Pes anserinus tendinitis of right lower extremity  -     Arthrocentesis aspiration and injection major Right joint bursa w/o US  -     triamcinolone acetonide (Kenalog-40) 40 MG/ML injection 40 mg    S/P TKR (total knee replacement) using cement, right  -     Arthrocentesis aspiration and injection major Right joint bursa w/o US  -     triamcinolone acetonide (Kenalog-40) 40 MG/ML injection 40 mg    Primary osteoarthritis of right knee  -     Arthrocentesis aspiration and injection major Right joint bursa w/o US  -     triamcinolone acetonide (Kenalog-40) 40 MG/ML injection 40 mg        Assessment: As above    Plan: Discussed with patient that she could potentially have a Baker's cyst.  I could not palpate on exam so I discussed with her that I did not want to attempt to aspirate this.  I offered her an appointment with interventional radiology, she did not want to proceed with this at this time.  She reports that if it gets worse, she may try this procedure.    For the patient's pes bursitis I discussed we could try cortisone injection.  Discussed with her that there is a risk of bleaching of her skin.  Also discussed with her that there is an increased risk of bleeding, bruising, and swelling with this injection.  She excepted this.  Patient is also diabetic.  Discussed with her if her glucose goes over 300 she should call her PCP.    Aspiration of pes negative.  2 cc 0.5% bupivacaine, 1 cc Kenalog injected, patient tolerated injection well.  Discussed that she can have this every 4 months.    Follow Up: No follow-ups on file.    PATRICA Vázquez

## 2024-09-23 NOTE — TELEPHONE ENCOUNTER
September 23, 2024       Darline Singh MD  [address]  [address]  [facility] [address]  Via Fax: [phone]      Patient: Noé Chen   YOB: 2010   Date of Visit: 9/23/2024       Dear Dr. Singh:    I saw your patient, Noé Chen, for an evaluation. Below are my notes for this visit with her.    If you have questions, please do not hesitate to call me.      Sincerely,        Daja Buchanan MD        CC: No Recipients    Daja Buchanan MD  9/23/2024 11:45 AM  Sign when Signing Visit  Subjective  Patient ID: Noé is a 14 year old female who is accompanied by:mother     Chief Complaint   Patient presents with   • Follow-up       HPI  Follow up for dysmenorrhea--on patch  Was seen over the weekend for chest pain--initially increased HR and then chest pain  -patient doing cross country running  Has hx of anxiety but does not seem to be correlated to increased HR  Mom was questioning if we could give referral to a therapist   2 years ago had syncope--evaluated by Peds Cardiology--felt it was not cardiac related    LAST MENSTRUAL PERIOD was 9/7/2024   Duration--5 days   Flow--changing pad every 3 hours  No clotting as before      Review of Systems   Constitutional: Negative.    HENT: Negative.     Eyes: Negative.    Respiratory: Negative.     Cardiovascular: Negative.    Gastrointestinal: Negative.    Endocrine: Negative.    Genitourinary: Negative.    Musculoskeletal: Negative.    Skin: Negative.    Allergic/Immunologic: Negative.    Neurological: Negative.    Hematological: Negative.    Psychiatric/Behavioral: Negative.         Patient's medications, allergies, past medical, surgical, social and family histories were reviewed and updated as appropriate.    Objective  Vitals:/74 (BP Location: LUE - Left upper extremity, Patient Position: Sitting, Cuff Size: Small Adult)   Pulse 76   Temp 97 °F (36.1 °C)   Ht 5' 2\" (1.575 m)   Wt 47.2 kg (104 lb 0.9 oz)    Patient requesting to speak to [facility] regarding procedure that had to be done prior to surgery on 01/19. Just to give update.  Please advise LMP 09/07/2024 (Exact Date)   BMI 19.03 kg/m²   BSA 1.45 m²   Physical Exam  Vitals reviewed.   Constitutional:       Appearance: Normal appearance.   HENT:      Head: Normocephalic and atraumatic.      Right Ear: External ear normal.      Left Ear: External ear normal.      Nose: Nose normal.      Mouth/Throat:      Mouth: Mucous membranes are moist.      Neck: Normal range of motion.   Eyes:      Conjunctiva/sclera: Conjunctivae normal.   Cardiovascular:      Rate and Rhythm: Normal rate and regular rhythm.      Pulses: Normal pulses.      Heart sounds: Normal heart sounds.   Pulmonary:      Effort: Pulmonary effort is normal.      Breath sounds: Normal breath sounds.   Abdominal:      General: Bowel sounds are normal.      Palpations: Abdomen is soft.   Musculoskeletal:         General: Normal range of motion.   Skin:     General: Skin is warm.      Capillary Refill: Capillary refill takes less than 2 seconds.   Neurological:      Mental Status: She is alert and oriented to person, place, and time.   Psychiatric:         Mood and Affect: Mood normal.         Speech: Speech normal.         Behavior: Behavior normal. Behavior is cooperative.         Cognition and Memory: Cognition normal.       Assessment  Problem List Items Addressed This Visit    None  Visit Diagnoses       Dysmenorrhea    -  Primary    Dysmenorrhea in the adolescent        Relevant Medications    norelgestromin-ethinyl estradiol (Zafemy) 150-35 MCG/24HR patch        -refilled medication for the next year  -for chest pain--advised if increased HR --then recommend stopping activit  -discussed techniques to decrease anxiety  -recommend seeing a therapist--Mom to call PCP for referrals  -I attest that I spent 30 total minutes for this patient’s encounter.   This total time included the following components: reviewing patient’s chart, reviewing results, obtaining a medical history, performing a physical examination, counseling the patient/family  member/caregiver, ordering any necessary medications, tests, or procedures, documenting clinical information in the EHR, referring to or communicating with other health care professionals if needed, independently interpreting any results if applicable, and providing care coordination if applicable

## 2024-12-11 RX ORDER — LORATADINE 10 MG/1
10 TABLET ORAL DAILY
Qty: 90 TABLET | Refills: 0 | Status: SHIPPED | OUTPATIENT
Start: 2024-12-11

## 2025-01-21 ENCOUNTER — LAB ENCOUNTER (OUTPATIENT)
Dept: LAB | Age: 75
End: 2025-01-21
Attending: INTERNAL MEDICINE
Payer: MEDICARE

## 2025-01-21 DIAGNOSIS — E78.2 MIXED HYPERLIPIDEMIA: ICD-10-CM

## 2025-01-21 DIAGNOSIS — E11.9 DM TYPE 2 (DIABETES MELLITUS, TYPE 2) (HCC): Primary | ICD-10-CM

## 2025-01-21 LAB
ALBUMIN SERPL-MCNC: 4.2 G/DL (ref 3.2–4.8)
ALBUMIN/GLOB SERPL: 1.2 {RATIO} (ref 1–2)
ALP LIVER SERPL-CCNC: 161 U/L
ALT SERPL-CCNC: 11 U/L
ANION GAP SERPL CALC-SCNC: 6 MMOL/L (ref 0–18)
AST SERPL-CCNC: 13 U/L (ref ?–34)
BILIRUB SERPL-MCNC: 0.3 MG/DL (ref 0.2–1.1)
BUN BLD-MCNC: 25 MG/DL (ref 9–23)
BUN/CREAT SERPL: 21.2 (ref 10–20)
CALCIUM BLD-MCNC: 9.6 MG/DL (ref 8.7–10.4)
CHLORIDE SERPL-SCNC: 103 MMOL/L (ref 98–112)
CHOLEST SERPL-MCNC: 146 MG/DL (ref ?–200)
CO2 SERPL-SCNC: 30 MMOL/L (ref 21–32)
CREAT BLD-MCNC: 1.18 MG/DL
CREAT UR-SCNC: 177.1 MG/DL
EGFRCR SERPLBLD CKD-EPI 2021: 48 ML/MIN/1.73M2 (ref 60–?)
EST. AVERAGE GLUCOSE BLD GHB EST-MCNC: 220 MG/DL (ref 68–126)
FASTING PATIENT LIPID ANSWER: YES
FASTING STATUS PATIENT QL REPORTED: YES
GLOBULIN PLAS-MCNC: 3.4 G/DL (ref 2–3.5)
GLUCOSE BLD-MCNC: 209 MG/DL (ref 70–99)
HBA1C MFR BLD: 9.3 % (ref ?–5.7)
HDLC SERPL-MCNC: 41 MG/DL (ref 40–59)
LDLC SERPL CALC-MCNC: 89 MG/DL (ref ?–100)
MICROALBUMIN UR-MCNC: 2.1 MG/DL
MICROALBUMIN/CREAT 24H UR-RTO: 11.9 UG/MG (ref ?–30)
NONHDLC SERPL-MCNC: 105 MG/DL (ref ?–130)
OSMOLALITY SERPL CALC.SUM OF ELEC: 299 MOSM/KG (ref 275–295)
POTASSIUM SERPL-SCNC: 4.2 MMOL/L (ref 3.5–5.1)
PROT SERPL-MCNC: 7.6 G/DL (ref 5.7–8.2)
SODIUM SERPL-SCNC: 139 MMOL/L (ref 136–145)
TRIGL SERPL-MCNC: 85 MG/DL (ref 30–149)
VLDLC SERPL CALC-MCNC: 14 MG/DL (ref 0–30)

## 2025-01-21 PROCEDURE — 82043 UR ALBUMIN QUANTITATIVE: CPT

## 2025-01-21 PROCEDURE — 83036 HEMOGLOBIN GLYCOSYLATED A1C: CPT

## 2025-01-21 PROCEDURE — 82570 ASSAY OF URINE CREATININE: CPT

## 2025-01-21 PROCEDURE — 36415 COLL VENOUS BLD VENIPUNCTURE: CPT

## 2025-01-21 PROCEDURE — 80053 COMPREHEN METABOLIC PANEL: CPT

## 2025-01-21 PROCEDURE — 80061 LIPID PANEL: CPT

## 2025-03-05 ENCOUNTER — HOSPITAL ENCOUNTER (EMERGENCY)
Facility: HOSPITAL | Age: 75
Discharge: HOME | End: 2025-03-05
Attending: EMERGENCY MEDICINE
Payer: MEDICARE

## 2025-03-05 ENCOUNTER — APPOINTMENT (OUTPATIENT)
Dept: ULTRASOUND IMAGING | Facility: HOSPITAL | Age: 75
End: 2025-03-05
Attending: EMERGENCY MEDICINE
Payer: MEDICARE

## 2025-03-05 ENCOUNTER — APPOINTMENT (OUTPATIENT)
Dept: GENERAL RADIOLOGY | Facility: HOSPITAL | Age: 75
End: 2025-03-05
Attending: EMERGENCY MEDICINE
Payer: MEDICARE

## 2025-03-05 VITALS
SYSTOLIC BLOOD PRESSURE: 181 MMHG | BODY MASS INDEX: 28.13 KG/M2 | RESPIRATION RATE: 18 BRPM | HEIGHT: 66 IN | DIASTOLIC BLOOD PRESSURE: 71 MMHG | HEART RATE: 83 BPM | OXYGEN SATURATION: 99 % | TEMPERATURE: 99 F | WEIGHT: 175 LBS

## 2025-03-05 DIAGNOSIS — M25.461 PAIN AND SWELLING OF RIGHT KNEE: ICD-10-CM

## 2025-03-05 DIAGNOSIS — I10 ELEVATED BLOOD PRESSURE READING WITH DIAGNOSIS OF HYPERTENSION: ICD-10-CM

## 2025-03-05 DIAGNOSIS — M25.561 CHRONIC PAIN OF RIGHT KNEE: Primary | ICD-10-CM

## 2025-03-05 DIAGNOSIS — G89.29 CHRONIC PAIN OF RIGHT KNEE: Primary | ICD-10-CM

## 2025-03-05 DIAGNOSIS — D50.9 MICROCYTIC ANEMIA: ICD-10-CM

## 2025-03-05 DIAGNOSIS — M25.561 PAIN AND SWELLING OF RIGHT KNEE: ICD-10-CM

## 2025-03-05 LAB
ANION GAP SERPL CALC-SCNC: 9 MMOL/L (ref 0–18)
BASOPHILS # BLD AUTO: 0.03 X10(3) UL (ref 0–0.2)
BASOPHILS NFR BLD AUTO: 0.3 %
BUN BLD-MCNC: 21 MG/DL (ref 9–23)
BUN/CREAT SERPL: 16.8 (ref 10–20)
CALCIUM BLD-MCNC: 8.8 MG/DL (ref 8.7–10.4)
CHLORIDE SERPL-SCNC: 100 MMOL/L (ref 98–112)
CO2 SERPL-SCNC: 25 MMOL/L (ref 21–32)
CREAT BLD-MCNC: 1.25 MG/DL
CRP SERPL-MCNC: 24.3 MG/DL (ref ?–1)
DEPRECATED RDW RBC AUTO: 44.8 FL (ref 35.1–46.3)
EGFRCR SERPLBLD CKD-EPI 2021: 45 ML/MIN/1.73M2 (ref 60–?)
EOSINOPHIL # BLD AUTO: 0.13 X10(3) UL (ref 0–0.7)
EOSINOPHIL NFR BLD AUTO: 1.1 %
ERYTHROCYTE [DISTWIDTH] IN BLOOD BY AUTOMATED COUNT: 14.6 % (ref 11–15)
ERYTHROCYTE [SEDIMENTATION RATE] IN BLOOD: 50 MM/HR
GLUCOSE BLD-MCNC: 174 MG/DL (ref 70–99)
HCT VFR BLD AUTO: 27.5 %
HGB BLD-MCNC: 9.2 G/DL
IMM GRANULOCYTES # BLD AUTO: 0.05 X10(3) UL (ref 0–1)
IMM GRANULOCYTES NFR BLD: 0.4 %
LYMPHOCYTES # BLD AUTO: 2.78 X10(3) UL (ref 1–4)
LYMPHOCYTES NFR BLD AUTO: 23.2 %
MCH RBC QN AUTO: 28.3 PG (ref 26–34)
MCHC RBC AUTO-ENTMCNC: 33.5 G/DL (ref 31–37)
MCV RBC AUTO: 84.6 FL
MONOCYTES # BLD AUTO: 1.14 X10(3) UL (ref 0.1–1)
MONOCYTES NFR BLD AUTO: 9.5 %
NEUTROPHILS # BLD AUTO: 7.85 X10 (3) UL (ref 1.5–7.7)
NEUTROPHILS # BLD AUTO: 7.85 X10(3) UL (ref 1.5–7.7)
NEUTROPHILS NFR BLD AUTO: 65.5 %
OSMOLALITY SERPL CALC.SUM OF ELEC: 285 MOSM/KG (ref 275–295)
PLATELET # BLD AUTO: 273 10(3)UL (ref 150–450)
POTASSIUM SERPL-SCNC: 3.8 MMOL/L (ref 3.5–5.1)
RBC # BLD AUTO: 3.25 X10(6)UL
SODIUM SERPL-SCNC: 134 MMOL/L (ref 136–145)
WBC # BLD AUTO: 12 X10(3) UL (ref 4–11)

## 2025-03-05 PROCEDURE — 20610 DRAIN/INJ JOINT/BURSA W/O US: CPT

## 2025-03-05 PROCEDURE — 85652 RBC SED RATE AUTOMATED: CPT | Performed by: EMERGENCY MEDICINE

## 2025-03-05 PROCEDURE — 99285 EMERGENCY DEPT VISIT HI MDM: CPT

## 2025-03-05 PROCEDURE — 93971 EXTREMITY STUDY: CPT | Performed by: EMERGENCY MEDICINE

## 2025-03-05 PROCEDURE — 80048 BASIC METABOLIC PNL TOTAL CA: CPT | Performed by: EMERGENCY MEDICINE

## 2025-03-05 PROCEDURE — 36415 COLL VENOUS BLD VENIPUNCTURE: CPT

## 2025-03-05 PROCEDURE — 73560 X-RAY EXAM OF KNEE 1 OR 2: CPT | Performed by: EMERGENCY MEDICINE

## 2025-03-05 PROCEDURE — 86140 C-REACTIVE PROTEIN: CPT | Performed by: EMERGENCY MEDICINE

## 2025-03-05 PROCEDURE — 85025 COMPLETE CBC W/AUTO DIFF WBC: CPT | Performed by: EMERGENCY MEDICINE

## 2025-03-05 RX ORDER — IBUPROFEN 600 MG/1
600 TABLET, FILM COATED ORAL EVERY 6 HOURS PRN
Qty: 28 TABLET | Refills: 0 | Status: SHIPPED | OUTPATIENT
Start: 2025-03-05 | End: 2025-03-14

## 2025-03-05 RX ORDER — IBUPROFEN 600 MG/1
600 TABLET, FILM COATED ORAL ONCE
Status: COMPLETED | OUTPATIENT
Start: 2025-03-05 | End: 2025-03-05

## 2025-03-05 RX ORDER — METHYLPREDNISOLONE 4 MG/1
TABLET ORAL
Qty: 21 TABLET | Refills: 0 | Status: SHIPPED | OUTPATIENT
Start: 2025-03-05 | End: 2025-03-14 | Stop reason: ALTCHOICE

## 2025-03-05 RX ORDER — LIDOCAINE 50 MG/G
1 PATCH TOPICAL EVERY 24 HOURS
Qty: 14 PATCH | Refills: 0 | Status: SHIPPED | OUTPATIENT
Start: 2025-03-05 | End: 2025-03-14

## 2025-03-05 RX ORDER — HYDROCODONE BITARTRATE AND ACETAMINOPHEN 5; 325 MG/1; MG/1
1 TABLET ORAL ONCE
Status: COMPLETED | OUTPATIENT
Start: 2025-03-05 | End: 2025-03-05

## 2025-03-05 NOTE — DISCHARGE INSTRUCTIONS
Thank you for seeking care at [facility] Emergency Department.  You have been seen and evaluated.    We discussed the results of your workup   Please read the instructions provided   If given prescriptions, take as instructed    Remember, your care process does not end after your visit today. Please follow-up with your doctor within 1-2 days for a follow-up check to ensure you are  improving, to see if you need any further evaluation/testing, or to evaluate for any alternate diagnoses.     Your blood pressure was noted to be elevated in the ER today. Please follow up with your primary doctor within the next 2-3 months for a reevaluation. Uncontrolled high blood pressure can lead to strokes, heart attacks, and kidney failure.     Please return to the emergency department if you develop numbness/tingling, fever, redness of the knee, chest pain, difficulty breathing, inability to drink liquids without vomiting, one sided numbness or weakness, slurred speech, severe headache, or if you develop any other new or concerning symptoms as these could be signs of more serious medical illness.    We hope you feel better.

## 2025-03-05 NOTE — ED INITIAL ASSESSMENT (HPI)
Pt w/ cane through triage c/o R knee pain and swelling. Pt was supposed to see ortho doc (dorene) to get a shot ~6 months ago and unable to make it.     
Prior to admission, pt required partial assist from wife for all ADLs/mobility. Pt used straight cane for ambulation at home, no other AD/AE. Pt has tub-shower at home.

## 2025-03-05 NOTE — ED PROVIDER NOTES
Patient Seen in: [facility] Emergency Department    History     Chief Complaint   Patient presents with    Knee Pain     Stated Complaint: Leg swelling    HPI    HPI: Nina Baker is a 74 year old female  hx of CAD, DM, HTN, HLD, sp R knee TKA in June of 2023 with Dr. Rivera who presents due to right knee pain and swelling.  Patient states that she was supposed to see her orthopedist about 8 months ago to get her right knee drained and has had worsening swelling and pain since then but has not been able to follow-up as she was told her orthopedist is leaving the practice.  Denies fevers but does feel like her right lower leg is swollen as well.  No chest pain or shortness of breath.  No recent travel.  No history of DVT or PE.  She has been taking \"a pain pill\" but she is not exactly sure what it is at home without relief.  She has had a hard time putting weight on the leg due to pain today. Pain worse with movement/weightbearing, improved at rest. No recent fall or injury. No numbness/tingling in her leg.     Past Medical History:    Arthritis    Chronic sinusitis    halitosis    Coronary atherosclerosis    Diabetes (HCC)    Esophageal reflux    Essential hypertension    High blood pressure    High cholesterol    Hyperlipidemia    Osteoarthritis    Visual impairment    glasses       Past Surgical History:   Procedure Laterality Date    Hysterectomy      Knee replacement surgery Left             Family History   Problem Relation Age of Onset    Cancer Father     Cancer Mother     Breast Cancer Mother        Social History     Socioeconomic History    Marital status:    Tobacco Use    Smoking status: Every Day     Types: Cigarettes    Smokeless tobacco: Never   Vaping Use    Vaping status: Never Used   Substance and Sexual Activity    Alcohol use: No     Alcohol/week: 0.0 standard drinks of alcohol    Drug use: No   Other Topics Concern    Caffeine Concern Yes     Comment: coffee       Review of  Systems    Positive for stated complaint: Leg swelling  Other systems are as noted in HPI.  Constitutional and vital signs reviewed.      All other systems reviewed and negative except as noted above.    PSFH elements reviewed from today and agreed except as otherwise stated in HPI.    Physical Exam     ED Triage Vitals [03/05/25 1230]   /89   Pulse 100   Resp 20   Temp 98.1 °F (36.7 °C)   Temp src Temporal   SpO2 100 %   O2 Device None (Room air)       Current:BP (!) 181/71   Pulse 83   Temp 99.3 °F (37.4 °C) (Oral)   Resp 18   Ht 167.6 cm (5' 6\")   Wt 79.4 kg   SpO2 99%   BMI 28.25 kg/m²         Physical Exam  Vitals and nursing note reviewed.   Constitutional:       General: She is not in acute distress.     Appearance: She is not ill-appearing.   HENT:      Head: Normocephalic and atraumatic.      Right Ear: External ear normal.      Left Ear: External ear normal.      Nose: Nose normal.      Mouth/Throat:      Mouth: Mucous membranes are moist.   Eyes:      Conjunctiva/sclera: Conjunctivae normal.   Cardiovascular:      Rate and Rhythm: Normal rate and regular rhythm.      Heart sounds: No murmur heard.  Pulmonary:      Effort: Pulmonary effort is normal. No respiratory distress.      Breath sounds: No wheezing or rales.   Abdominal:      General: There is no distension.      Palpations: Abdomen is soft.      Tenderness: There is no abdominal tenderness. There is no guarding or rebound.   Musculoskeletal:         General: Swelling and tenderness present. No deformity.      Comments: There is 1+ right lower leg edema, trace left ankle edema, no calf tenderness.  There is swelling of the right knee with pain on active range of motion though patient is able to flex the knee to approximately 90 degrees and fully extend it.  No ligamentous laxity   Skin:     General: Skin is warm and dry.      Capillary Refill: Capillary refill takes less than 2 seconds.   Neurological:      General: No focal deficit  present.      Mental Status: She is alert.             ED Course     Labs Reviewed   BASIC METABOLIC PANEL (8) - Abnormal; Notable for the following components:       Result Value    Glucose 174 (*)     Sodium 134 (*)     Creatinine 1.25 (*)     eGFR-Cr 45 (*)     All other components within normal limits   CBC WITH DIFFERENTIAL WITH PLATELET - Abnormal; Notable for the following components:    WBC 12.0 (*)     RBC 3.25 (*)     HGB 9.2 (*)     HCT 27.5 (*)     Neutrophil Absolute Prelim 7.85 (*)     Neutrophil Absolute 7.85 (*)     Monocyte Absolute 1.14 (*)     All other components within normal limits   SED RATE, WESTERGREN (AUTOMATED) - Abnormal; Notable for the following components:    Sed Rate 50 (*)     All other components within normal limits   C-REACTIVE PROTEIN - Abnormal; Notable for the following components:    C-Reactive Protein 24.30 (*)     All other components within normal limits       Reviewed XR of bilateral knees from August 2024  \"Right knee:  Intact TKA with no evidence of loosening.  Normal alignment with no fracture.  There is a small joint effusion      Left knee:  Intact TKA with long stem femoral and tibial components.  No evidence of loosening.  Normal alignment with no fracture.  No significant joint effusion \"   MDM     This patient presents with acute on chronic right knee pain and swelling, s/p R knee TKA nearly 2 years ago with Dr. Rivera with orthopedics. VSS on arrival to ED, she is afebrile, no erythema/warmth to knee.     Differential diagnoses considered includes, but is not limited to:   Acute on chronic knee pain after TKA   Knee effusion  Dvt   Less likely septic arthritis given pt able to actively range her knee, possibly gouty arthritis     I attempted knee joint aspiration unable to obtain fluid after multiple attempts under sterile technique without success.     Will obtain the following tests: cbc, bmp, esr/crp, xr and R leg doppler   Will administer the following  medications/therapies: norco, ibuprofen, lido patch, will d/w orthopedics pending ER workup     Please see ED course for my independent review of these tests/imaging results.    Chronic conditions affecting care: CAD, DM, HTN, HLD     Workup and medications considered but not ordered: n/a     Social Determinants of Health that impacted care: n/a     ED Course as of 03/05/25 2321  ------------------------------------------------------------  Time: 03/05 1533  Value: Hemoglobin(!): 9.2  Comment: Pt does have anemia on prior lab but no history. Advised she f/u with PMD about this incidental finding. Plt ok. Mild leukocytosis is nonspecific   ------------------------------------------------------------  Time: 03/05 1539  Value: SED RATE(!): 50  Comment: ESR mildly elevated   ------------------------------------------------------------  Time: 03/05 1548  Value: XR KNEE (1 OR 2 VIEWS), RIGHT (CPT=73560)  Comment:   Impression  CONCLUSION:  1. No acute fracture or suspicious bone lesion.  2. Anatomically aligned uncomplicated right total knee arthroplasty.  Small stable knee joint effusion.  3. Mild soft tissue swelling around the knee.    ------------------------------------------------------------  Time: 03/05 1632  Value: US VENOUS DOPPLER LEG RIGHT - DIAG IMG (CPT=93971)  Comment:   Impression  CONCLUSION: No sonographic evidence of right lower extremity DVT.       ------------------------------------------------------------  Time: 03/05 1716  Comment: Discussed with orthopedics office, states they would like patient to call tomorrow to follow-up in the office.  Advised patient of her results, she is feeling better at this time and would like to go home.  Advised her on findings of anemia and elevated blood pressure, states she will follow-up with her doctor about this.  She already takes tramadol so we will hold off on further opioids for home but will give steroid Dosepak, sparing ibuprofen to use with Tylenol and  lido patches.  Return if new or worsening symptoms occur right away or difficulty following up.  Patient comfortable with the plan             Disposition and Plan     Clinical Impression:  1. Chronic pain of right knee    2. Pain and swelling of right knee    3. Microcytic anemia    4. Elevated blood pressure reading with diagnosis of hypertension        Disposition:  Discharge    Follow-up:  Rosalino Zhang MD  [address] [facility]  [address]  [facility] [address]  [phone]    Schedule an appointment as soon as possible for a visit in 2 day(s)      [facility]  [address] [facility] [address]  [phone]  Go to  If symptoms worsen    Orthopedic Surgical Follow Up  [address], [place] [address]    [phone]  Schedule an appointment as soon as possible for a visit in 1 week(s)  Call tomorrow to schedule follow up within 7 days      Medications Prescribed:  Discharge Medication List as of 3/5/2025  5:20 PM        START taking these medications    Details   methylPREDNISolone 4 MG Oral Tablet Therapy Pack Take as directed on dose pack instructions, Normal, Disp-21 tablet, R-0      lidocaine 5 % External Patch Place 1 patch onto the skin daily., Normal, Disp-14 patch, R-0      ibuprofen 600 MG Oral Tab Take 1 tablet (600 mg total) by mouth every 6 (six) hours as needed., Normal, Disp-28 tablet, R-0             Yoly Batista,   Attending Physician  Emergency Medicine

## 2025-03-11 ENCOUNTER — OFFICE VISIT (OUTPATIENT)
Dept: ORTHOPEDICS CLINIC | Facility: CLINIC | Age: 75
End: 2025-03-11

## 2025-03-11 ENCOUNTER — HOSPITAL ENCOUNTER (OUTPATIENT)
Dept: GENERAL RADIOLOGY | Facility: HOSPITAL | Age: 75
Discharge: HOME | End: 2025-03-11
Attending: ORTHOPAEDIC SURGERY
Payer: MEDICARE

## 2025-03-11 VITALS
WEIGHT: 175 LBS | DIASTOLIC BLOOD PRESSURE: 78 MMHG | HEIGHT: 66 IN | BODY MASS INDEX: 28.13 KG/M2 | HEART RATE: 100 BPM | SYSTOLIC BLOOD PRESSURE: 147 MMHG

## 2025-03-11 DIAGNOSIS — M17.11 PRIMARY OSTEOARTHRITIS OF RIGHT KNEE: Primary | ICD-10-CM

## 2025-03-11 DIAGNOSIS — R52 PAIN: ICD-10-CM

## 2025-03-11 DIAGNOSIS — T84.53XD INFECTION ASSOCIATED WITH INTERNAL RIGHT KNEE PROSTHESIS, SUBSEQUENT ENCOUNTER: ICD-10-CM

## 2025-03-11 LAB
BASOPHILS NFR SNV: 0 %
EOSINOPHIL NFR SNV: 1 %
LYMPHOCYTES NFR SNV: 1 %
MONOS+MACROS NFR SNV: 5 %
NEUTROPHILS NFR FLD: 93 %
RBC # FLD AUTO: ABNORMAL /CUMM (ref ?–1)
TOTAL CELLS COUNTED FLD: 100
TOTAL CELLS COUNTED SNV: 5904 /CUMM (ref 0–200)
WBC # SNV: 5904 /CUMM

## 2025-03-11 PROCEDURE — 73562 X-RAY EXAM OF KNEE 3: CPT | Performed by: ORTHOPAEDIC SURGERY

## 2025-03-11 PROCEDURE — 99214 OFFICE O/P EST MOD 30 MIN: CPT | Performed by: ORTHOPAEDIC SURGERY

## 2025-03-11 RX ORDER — DOXYCYCLINE 100 MG/1
100 CAPSULE ORAL 2 TIMES DAILY
Qty: 20 CAPSULE | Refills: 0 | Status: SHIPPED | OUTPATIENT
Start: 2025-03-11

## 2025-03-11 RX ORDER — SULFAMETHOXAZOLE AND TRIMETHOPRIM 800; 160 MG/1; MG/1
1 TABLET ORAL 2 TIMES DAILY
Qty: 30 TABLET | Refills: 0 | Status: SHIPPED | OUTPATIENT
Start: 2025-03-11

## 2025-03-11 NOTE — H&P
NURSING INTAKE COMMENTS:   Chief Complaint   Patient presents with    Er F/u     Pt was in the ER 3/5/25 for Right knee swelling and discharge coming out of knee pt is walking with cane. pt had a Right TKA June 2023.  Pt pain score 7/10          HPI: This 74 year old female presents today with draining wound from right knee replacement.  I replaced the right knee in June 2023 and she was doing well until about 9 days ago.  She developed itchy sores on the medial left and right knees and has been itching them.  About 9 days ago there developed drainage from the right knee and she presented to the [facility] emergency room.  Sed rate and CRP as well as white count were high.  The patient denies fever chills or sweats however.  She seems to point to pain only in the superficial medial right knee and not the deep knee replacement.  She is ambulating.  She has not been started on antibiotics at this point.  She is diabetic.    Past Medical History:    Arthritis    Chronic sinusitis    halitosis    Coronary atherosclerosis    Diabetes (HCC)    Esophageal reflux    Essential hypertension    High blood pressure    High cholesterol    Hyperlipidemia    Osteoarthritis    Visual impairment    glasses     Past Surgical History:   Procedure Laterality Date    Hysterectomy      Knee replacement surgery Left      Current Outpatient Medications   Medication Sig Dispense Refill    doxycycline 100 MG Oral Cap Take 1 capsule (100 mg total) by mouth 2 (two) times daily. 20 capsule 0    sulfamethoxazole-trimethoprim -160 MG Oral Tab per tablet Take 1 tablet by mouth 2 (two) times daily. 30 tablet 0    methylPREDNISolone 4 MG Oral Tablet Therapy Pack Take as directed on dose pack instructions 21 tablet 0    lidocaine 5 % External Patch Place 1 patch onto the skin daily. 14 patch 0    ibuprofen 600 MG Oral Tab Take 1 tablet (600 mg total) by mouth every 6 (six) hours as needed. 28 tablet 0    loratadine 10 MG Oral Tab TAKE 1  TABLET BY MOUTH EVERY DAY 90 tablet 0    acetaminophen 500 MG Oral Tab Take 1 tablet (500 mg total) by mouth nightly as needed for Pain. 20 tablet 0    diphenhydrAMINE (BANOPHEN) 25 MG Oral Cap Take 1 capsule (25 mg total) by mouth nightly as needed for Sleep. 10 capsule 0    Acetaminophen 500 MG Oral Cap Take 1-2 capsules (500-1,000 mg total) by mouth every 12 (twelve) hours as needed for Pain. Do not take more than 4000 mg of acetaminophen from all sources in 24 hours. 40 capsule 0    pantoprazole 40 MG Oral Tab EC       diphenhydrAMINE 25 MG Oral Cap Take 1 capsule (25 mg total) by mouth every 6 (six) hours as needed for Itching. 20 capsule 0    aspirin 81 MG Oral Tab EC Take 1 tablet (81 mg total) by mouth 2 (two) times daily with meals. Take with breakfast and dinner for 1 month.  Then resume aspirin 81 mg once daily. 60 tablet 0    calcium carbonate-vitamin D 250-3.125 MG-MCG Oral Tab Take 1 tablet by mouth 2 (two) times daily with meals. 60 tablet 0    multivitamin with minerals Oral Tab Take 1 tablet by mouth daily. 30 tablet 0    docusate sodium 100 MG Oral Cap Take 1 capsule by mouth daily.      montelukast 10 MG Oral Tab Take 1 tablet (10 mg total) by mouth nightly. 30 tablet 3    ticagrelor (BRILINTA) 90 MG Oral Tab Take 1 tablet (90 mg total) by mouth every 12 (twelve) hours. 60 tablet 3    atorvastatin 20 MG Oral Tab Take 1 tablet (20 mg total) by mouth nightly. 30 tablet 5    JANUVIA 100 MG Oral Tab Take 1 tablet (100 mg total) by mouth daily.      alprazolam 0.25 MG Oral Tab Take 1 tablet (0.25 mg total) by mouth 4 (four) times daily as needed.  5    glipiZIDE 5 MG Oral Tab Take 2 tablets (10 mg total) by mouth 2 (two) times daily before meals.  2    losartan 25 MG Oral Tab Take 1 tablet (25 mg total) by mouth daily.  0    LYRICA 300 MG Oral Cap Take 1 capsule (300 mg total) by mouth nightly.  3     Allergies[1]  Family History   Problem Relation Age of Onset    Cancer Father     Cancer Mother      Breast Cancer Mother      No family Hx of DVT/PE    Social History     Occupational History    Not on file   Tobacco Use    Smoking status: Every Day     Types: Cigarettes    Smokeless tobacco: Never   Vaping Use    Vaping status: Never Used   Substance and Sexual Activity    Alcohol use: No     Alcohol/week: 0.0 standard drinks of alcohol    Drug use: No    Sexual activity: Not on file        Review of Systems:  GENERAL: feels generally well, no significant weight loss or weight gain  SKIN: no ulcerated or worrisome skin lesions  EYES:denies blurred vision or double vision  HEENT: denies new nasal congestion, sinus pain or ST  LUNGS: denies shortness of breath  CARDIOVASCULAR: denies chest pain  GI: no hematemesis, no worsening heartburn, no diarrhea  : no dysuria, no blood in urine, no difficulty urinating, no incontinence  MUSCULOSKELETAL: no other musculoskeletal complaints other than in HPI  NEURO: no numbness or tingling, no weakness or balance disorder  PSYCHE: no depression or anxiety  HEMATOLOGIC: no hx of blood dyscrasia, no Hx DVT/PE  ENDOCRINE: no thyroid or diabetes issues  ALL/ASTHMA: no new hx of severe allergy or asthma    Physical Examination:    /78   Pulse 100   Ht 5' 6\" (1.676 m)   Wt 175 lb (79.4 kg)   BMI 28.25 kg/m²   Constitutional: appears well hydrated, alert and responsive, no acute distress noted  Extremities: Left knee replacement looks benign.  Limited motion however with permanent stiffness from many years ago 0 to 50 degrees.  Musculoskeletal: Right knee replacement wound all normal except for the very distal few centimeters with draining sinus with purulent liquid.  No significant tenderness to the suprapatella pouch medially or laterally.  The sores on the medial right knee were little worse than the left and look almost like measles or chickenpox.  Similar sores on the medial left knee but not as red.  No calf tenderness.  No pitting edema distally.  Motion 0 to 90  degrees.  Neurological: Normal motor and sensory distally.    Imaging: X-rays of the right knee replacement show 2 areas of lucency on the medial and lateral tibial plateaus unchanged since previous x-ray March 2024.      US VENOUS DOPPLER LEG RIGHT - DIAG IMG (CPT=93971)    Result Date: 3/5/2025  PROCEDURE: US VENOUS DOPPLER LEG RIGHT-DIAG IMG (CPT=93971)  COMPARISON: None.  INDICATIONS: eval for DVT  TECHNIQUE: Color duplex Doppler venous ultrasound of the right lower extremity was performed in the usual manner.  FINDINGS: The femoral and popliteal veins appear normal.  Normal flow was demonstrated with color and pulsed Doppler.  Visualized portions of the great and small saphenous, posterior tibial, and peroneal veins appear normal.   THROMBI: None visible. COMPRESSIBILITY: Normal. OTHER: Negative.         CONCLUSION: No sonographic evidence of right lower extremity DVT.    Dictated by (CST): Pio Davis MD on 3/05/2025 at 4:30 PM     Finalized by (CST): Pio Davis MD on 3/05/2025 at 4:30 PM          XR KNEE (1 OR 2 VIEWS), RIGHT (CPT=73560)    Result Date: 3/5/2025  PROCEDURE: XR KNEE (1 OR 2 VIEWS), RIGHT (CPT=73560)  COMPARISON: [facility], XR KNEE BILAT EM (ECH=[id number]), 8/19/2024, 12:35 PM.  INDICATIONS: Right knee pain and swelling x 1 week.  TECHNIQUE: 2 views were obtained without weightbearing technique.          CONCLUSION:  1. No acute fracture or suspicious bone lesion. 2. Anatomically aligned uncomplicated right total knee arthroplasty.  Small stable knee joint effusion. 3. Mild soft tissue swelling around the knee.    Dictated by (CST): Robson Herring MD on 3/05/2025 at 3:44 PM     Finalized by (CST): Robson Herring MD on 3/05/2025 at 3:46 PM             Lab Results   Component Value Date    WBC 12.0 (H) 03/05/2025    HGB 9.2 (L) 03/05/2025    .0 03/05/2025      Lab Results   Component Value Date     (H) 03/05/2025    BUN 21 03/05/2025    CREATSERUM  1.25 (H) 03/05/2025    GFRNAA 57 (L) 05/19/2022    GFRAA 65 05/19/2022        Assessment and Plan:  Diagnoses and all orders for this visit:    Primary osteoarthritis of right knee  -     Body Fluid Cult Aerobic and Anaerobic; Future  -     Cell Count/Diff & Crystals, Synovial; Future  -     Body Fluid Cult Aerobic and Anaerobic; Future  -     doxycycline 100 MG Oral Cap; Take 1 capsule (100 mg total) by mouth 2 (two) times daily.  -     sulfamethoxazole-trimethoprim -160 MG Oral Tab per tablet; Take 1 tablet by mouth 2 (two) times daily.  -     [facility] WOUND EVAL  -     Infectious Disease Referral - In Network    Pain  -     XR KNEE (3 VIEWS), RIGHT (CPT=73562); Future  -     doxycycline 100 MG Oral Cap; Take 1 capsule (100 mg total) by mouth 2 (two) times daily.  -     sulfamethoxazole-trimethoprim -160 MG Oral Tab per tablet; Take 1 tablet by mouth 2 (two) times daily.  -     [facility] WOUND EVAL    Infection associated with internal right knee prosthesis, subsequent encounter  -     Body Fluid Cult Aerobic and Anaerobic; Future  -     Cell Count/Diff & Crystals, Synovial; Future  -     Body Fluid Cult Aerobic and Anaerobic; Future  -     doxycycline 100 MG Oral Cap; Take 1 capsule (100 mg total) by mouth 2 (two) times daily.  -     sulfamethoxazole-trimethoprim -160 MG Oral Tab per tablet; Take 1 tablet by mouth 2 (two) times daily.  -     [facility] WOUND EVAL  -     Infectious Disease Referral - In Network        Assessment: Superficial wound infection right knee replacement and possible deep knee replacement for about 9 to 10 days.  No fevers, chills, or sweats.    Plan: I spent quite a bit of time with her.  We probed the wound with the back of a Betadine stick.  It goes about 5 cm medially.  I did not seem to probe deep to the knee replacement however.  No creamy pus, but liquid purulence drainage not under pressure.    Using a 18-gauge needle sterile technique, aspiration of the suprapatellar pouch  yielded only a little blood and no andres effusion.  No purulence was obvious.  I sent the swab from the wound and the aspiration from the knee separately for culture.    She is on Brilinta for several years.  She has not taken it today and I told her to hold for now.  We will talk to Dr. Peterson, her cardiologist to see if we can hold it for now as she will likely need surgery either this week or next.  I placed her on doxycycline and Bactrim DS both twice daily for now and also placed wound care consult and infectious disease consult.    I explained that we are trying to save her prosthesis although it may have to be removed depending on surgical findings.  If the infection is deep, we can try simple irrigation and debridement with a drain since this is only 9 days old.  I told her if this fails, she will have to have the prosthesis removed.  Will try to put her on the schedule later this week if we can get clearance.    Follow Up: No follow-ups on file.    Houston Rivera MD         [1]   Allergies  Allergen Reactions    Insulin Aspart ITCHING

## 2025-03-12 ENCOUNTER — TELEPHONE (OUTPATIENT)
Dept: ORTHOPEDICS CLINIC | Facility: CLINIC | Age: 75
End: 2025-03-12

## 2025-03-12 NOTE — TELEPHONE ENCOUNTER
Per patient received call from Dr. Rivera today informing her that she is scheduled for surgery on 3/18, patient needs to confirm this, states she was also told office would be reaching out with further details. Please call thank you.

## 2025-03-13 NOTE — TELEPHONE ENCOUNTER
Spoke with patient to inform her that after March 14th, Dr. Rivera will no longer be seeing patients at [facility]. I explained that she needs to call over to the [facility]. Patient stated that she was at a doctor's appointment and didn't have anything to write with at the time. She will call me back for the information once she's done.

## 2025-03-13 NOTE — TELEPHONE ENCOUNTER
Patient called back and I provided her with the phone number to [facility] office.  She is to call there to discuss her surgery.

## 2025-03-14 RX ORDER — TRAMADOL HYDROCHLORIDE 50 MG/1
50 TABLET ORAL EVERY 6 HOURS PRN
COMMUNITY
End: 2025-03-21

## 2025-03-15 ENCOUNTER — LAB ENCOUNTER (OUTPATIENT)
Dept: LAB | Facility: HOSPITAL | Age: 75
End: 2025-03-15
Attending: ORTHOPAEDIC SURGERY
Payer: MEDICARE

## 2025-03-15 DIAGNOSIS — Z01.818 PRE-OP TESTING: ICD-10-CM

## 2025-03-15 DIAGNOSIS — E78.5 HYPERLIPIDEMIA: Primary | ICD-10-CM

## 2025-03-15 LAB
ALBUMIN SERPL-MCNC: 4 G/DL (ref 3.2–4.8)
ALBUMIN/GLOB SERPL: 1.1 {RATIO} (ref 1–2)
ALP LIVER SERPL-CCNC: 125 U/L
ALT SERPL-CCNC: 17 U/L
ANION GAP SERPL CALC-SCNC: 10 MMOL/L (ref 0–18)
ANTIBODY SCREEN: NEGATIVE
AST SERPL-CCNC: 16 U/L (ref ?–34)
BILIRUB SERPL-MCNC: 0.2 MG/DL (ref 0.2–1.1)
BUN BLD-MCNC: 34 MG/DL (ref 9–23)
BUN/CREAT SERPL: 21.1 (ref 10–20)
CALCIUM BLD-MCNC: 9 MG/DL (ref 8.7–10.4)
CHLORIDE SERPL-SCNC: 103 MMOL/L (ref 98–112)
CHOLEST SERPL-MCNC: 118 MG/DL (ref ?–200)
CK SERPL-CCNC: 69 U/L
CO2 SERPL-SCNC: 22 MMOL/L (ref 21–32)
CREAT BLD-MCNC: 1.61 MG/DL
EGFRCR SERPLBLD CKD-EPI 2021: 33 ML/MIN/1.73M2 (ref 60–?)
FASTING PATIENT LIPID ANSWER: NO
FASTING STATUS PATIENT QL REPORTED: NO
GLOBULIN PLAS-MCNC: 3.8 G/DL (ref 2–3.5)
GLUCOSE BLD-MCNC: 184 MG/DL (ref 70–99)
HDLC SERPL-MCNC: 34 MG/DL (ref 40–59)
LDLC SERPL CALC-MCNC: 70 MG/DL (ref ?–100)
NONHDLC SERPL-MCNC: 84 MG/DL (ref ?–130)
OSMOLALITY SERPL CALC.SUM OF ELEC: 292 MOSM/KG (ref 275–295)
POTASSIUM SERPL-SCNC: 4.5 MMOL/L (ref 3.5–5.1)
PROT SERPL-MCNC: 7.8 G/DL (ref 5.7–8.2)
RH BLOOD TYPE: POSITIVE
SODIUM SERPL-SCNC: 135 MMOL/L (ref 136–145)
TRIGL SERPL-MCNC: 68 MG/DL (ref 30–149)
VLDLC SERPL CALC-MCNC: 10 MG/DL (ref 0–30)

## 2025-03-15 PROCEDURE — 80053 COMPREHEN METABOLIC PANEL: CPT

## 2025-03-15 PROCEDURE — 36415 COLL VENOUS BLD VENIPUNCTURE: CPT

## 2025-03-15 PROCEDURE — 87641 MR-STAPH DNA AMP PROBE: CPT

## 2025-03-15 PROCEDURE — 80061 LIPID PANEL: CPT

## 2025-03-15 PROCEDURE — 86850 RBC ANTIBODY SCREEN: CPT

## 2025-03-15 PROCEDURE — 86901 BLOOD TYPING SEROLOGIC RH(D): CPT

## 2025-03-15 PROCEDURE — 82550 ASSAY OF CK (CPK): CPT

## 2025-03-15 PROCEDURE — 86900 BLOOD TYPING SEROLOGIC ABO: CPT

## 2025-03-15 PROCEDURE — 93005 ELECTROCARDIOGRAM TRACING: CPT

## 2025-03-15 PROCEDURE — 93010 ELECTROCARDIOGRAM REPORT: CPT | Performed by: INTERNAL MEDICINE

## 2025-03-16 LAB
ATRIAL RATE: 81 BPM
MRSA DNA SPEC QL NAA+PROBE: NEGATIVE
P AXIS: 38 DEGREES
P-R INTERVAL: 198 MS
Q-T INTERVAL: 378 MS
QRS DURATION: 74 MS
QTC CALCULATION (BEZET): 439 MS
R AXIS: 38 DEGREES
T AXIS: 75 DEGREES
VENTRICULAR RATE: 81 BPM

## 2025-03-17 PROCEDURE — 0M9N3ZX DRAINAGE OF RIGHT KNEE BURSA AND LIGAMENT, PERCUTANEOUS APPROACH, DIAGNOSTIC: ICD-10-PCS | Performed by: ORTHOPAEDIC SURGERY

## 2025-03-17 NOTE — H&P
Houston Rivera MD   Physician  Specialty: SURGERY, ORTHOPEDIC     H&P     Signed       Signed       Expand All Collapse All    NURSING INTAKE COMMENTS:        Chief Complaint   Patient presents with    Er F/u       Pt was in the ER 3/5/25 for Right knee swelling and discharge coming out of knee pt is walking with cane. pt had a Right TKA June 2023.  Pt pain score 7/10             HPI: This 74 year old female presents today with draining wound from right knee replacement.  I replaced the right knee in June 2023 and she was doing well until about 9 days ago.  She developed itchy sores on the medial left and right knees and has been itching them.  About 9 days ago there developed drainage from the right knee and she presented to the [facility] emergency room.  Sed rate and CRP as well as white count were high.  The patient denies fever chills or sweats however.  She seems to point to pain only in the superficial medial right knee and not the deep knee replacement.  She is ambulating.  She has not been started on antibiotics at this point.  She is diabetic.     Past Medical History       Past Medical History:    Arthritis    Chronic sinusitis     halitosis    Coronary atherosclerosis    Diabetes (HCC)    Esophageal reflux    Essential hypertension    High blood pressure    High cholesterol    Hyperlipidemia    Osteoarthritis    Visual impairment     glasses         Past Surgical History         Past Surgical History:   Procedure Laterality Date    Hysterectomy        Knee replacement surgery Left           Current Medications          Current Outpatient Medications   Medication Sig Dispense Refill    doxycycline 100 MG Oral Cap Take 1 capsule (100 mg total) by mouth 2 (two) times daily. 20 capsule 0    sulfamethoxazole-trimethoprim -160 MG Oral Tab per tablet Take 1 tablet by mouth 2 (two) times daily. 30 tablet 0    methylPREDNISolone 4 MG Oral Tablet Therapy Pack Take as directed on dose pack  instructions 21 tablet 0    lidocaine 5 % External Patch Place 1 patch onto the skin daily. 14 patch 0    ibuprofen 600 MG Oral Tab Take 1 tablet (600 mg total) by mouth every 6 (six) hours as needed. 28 tablet 0    loratadine 10 MG Oral Tab TAKE 1 TABLET BY MOUTH EVERY DAY 90 tablet 0    acetaminophen 500 MG Oral Tab Take 1 tablet (500 mg total) by mouth nightly as needed for Pain. 20 tablet 0    diphenhydrAMINE (BANOPHEN) 25 MG Oral Cap Take 1 capsule (25 mg total) by mouth nightly as needed for Sleep. 10 capsule 0    Acetaminophen 500 MG Oral Cap Take 1-2 capsules (500-1,000 mg total) by mouth every 12 (twelve) hours as needed for Pain. Do not take more than 4000 mg of acetaminophen from all sources in 24 hours. 40 capsule 0    pantoprazole 40 MG Oral Tab EC          diphenhydrAMINE 25 MG Oral Cap Take 1 capsule (25 mg total) by mouth every 6 (six) hours as needed for Itching. 20 capsule 0    aspirin 81 MG Oral Tab EC Take 1 tablet (81 mg total) by mouth 2 (two) times daily with meals. Take with breakfast and dinner for 1 month.  Then resume aspirin 81 mg once daily. 60 tablet 0    calcium carbonate-vitamin D 250-3.125 MG-MCG Oral Tab Take 1 tablet by mouth 2 (two) times daily with meals. 60 tablet 0    multivitamin with minerals Oral Tab Take 1 tablet by mouth daily. 30 tablet 0    docusate sodium 100 MG Oral Cap Take 1 capsule by mouth daily.        montelukast 10 MG Oral Tab Take 1 tablet (10 mg total) by mouth nightly. 30 tablet 3    ticagrelor (BRILINTA) 90 MG Oral Tab Take 1 tablet (90 mg total) by mouth every 12 (twelve) hours. 60 tablet 3    atorvastatin 20 MG Oral Tab Take 1 tablet (20 mg total) by mouth nightly. 30 tablet 5    JANUVIA 100 MG Oral Tab Take 1 tablet (100 mg total) by mouth daily.        alprazolam 0.25 MG Oral Tab Take 1 tablet (0.25 mg total) by mouth 4 (four) times daily as needed.   5    glipiZIDE 5 MG Oral Tab Take 2 tablets (10 mg total) by mouth 2 (two) times daily before meals.   2     losartan 25 MG Oral Tab Take 1 tablet (25 mg total) by mouth daily.   0    LYRICA 300 MG Oral Cap Take 1 capsule (300 mg total) by mouth nightly.   3         [Allergies]    [Allergies]       Allergen Reactions    Insulin Aspart ITCHING     Family History         Family History   Problem Relation Age of Onset    Cancer Father      Cancer Mother      Breast Cancer Mother           No family Hx of DVT/PE     Social History            Occupational History    Not on file   Tobacco Use    Smoking status: Every Day       Types: Cigarettes    Smokeless tobacco: Never   Vaping Use    Vaping status: Never Used   Substance and Sexual Activity    Alcohol use: No       Alcohol/week: 0.0 standard drinks of alcohol    Drug use: No    Sexual activity: Not on file         Review of Systems:  GENERAL: feels generally well, no significant weight loss or weight gain  SKIN: no ulcerated or worrisome skin lesions  EYES:denies blurred vision or double vision  HEENT: denies new nasal congestion, sinus pain or ST  LUNGS: denies shortness of breath  CARDIOVASCULAR: denies chest pain  GI: no hematemesis, no worsening heartburn, no diarrhea  : no dysuria, no blood in urine, no difficulty urinating, no incontinence  MUSCULOSKELETAL: no other musculoskeletal complaints other than in HPI  NEURO: no numbness or tingling, no weakness or balance disorder  PSYCHE: no depression or anxiety  HEMATOLOGIC: no hx of blood dyscrasia, no Hx DVT/PE  ENDOCRINE: no thyroid or diabetes issues  ALL/ASTHMA: no new hx of severe allergy or asthma     Physical Examination:    /78   Pulse 100   Ht 5' 6\" (1.676 m)   Wt 175 lb (79.4 kg)   BMI 28.25 kg/m²   Constitutional: appears well hydrated, alert and responsive, no acute distress noted  Extremities: Left knee replacement looks benign.  Limited motion however with permanent stiffness from many years ago 0 to 50 degrees.  Musculoskeletal: Right knee replacement wound all normal except for the very  distal few centimeters with draining sinus with purulent liquid.  No significant tenderness to the suprapatella pouch medially or laterally.  The sores on the medial right knee were little worse than the left and look almost like measles or chickenpox.  Similar sores on the medial left knee but not as red.  No calf tenderness.  No pitting edema distally.  Motion 0 to 90 degrees.  Neurological: Normal motor and sensory distally.     Imaging: X-rays of the right knee replacement show 2 areas of lucency on the medial and lateral tibial plateaus unchanged since previous x-ray March 2024.        US VENOUS DOPPLER LEG RIGHT - DIAG IMG (CPT=93971)     Result Date: 3/5/2025  PROCEDURE:            US VENOUS DOPPLER LEG RIGHT-DIAG IMG (CPT=93971)  COMPARISON:          None.  INDICATIONS:            eval for DVT  TECHNIQUE:    Color duplex Doppler venous ultrasound of the right lower extremity was performed in the usual manner.  FINDINGS:             The femoral and popliteal veins appear normal.  Normal flow was demonstrated with color and pulsed Doppler.  Visualized portions of the great and small saphenous, posterior tibial, and peroneal veins appear normal.   THROMBI:        None visible. COMPRESSIBILITY: Normal. OTHER:         Negative.          CONCLUSION:           No sonographic evidence of right lower extremity DVT.    Dictated by (CST): Pio Davis MD on 3/05/2025 at 4:30 PM     Finalized by (CST): Pio Davis MD on 3/05/2025 at 4:30 PM           XR KNEE (1 OR 2 VIEWS), RIGHT (CPT=73560)     Result Date: 3/5/2025  PROCEDURE:            XR KNEE (1 OR 2 VIEWS), RIGHT (CPT=73560)  COMPARISON:          [facility], XR KNEE BILAT EM (BAP=[id number]), 8/19/2024, 12:35 PM.  INDICATIONS:     Right knee pain and swelling x 1 week.  TECHNIQUE:        2 views were obtained without weightbearing technique.           CONCLUSION:            1. No acute fracture or suspicious bone lesion. 2.  Anatomically aligned uncomplicated right total knee arthroplasty.  Small stable knee joint effusion. 3. Mild soft tissue swelling around the knee.    Dictated by (CST): Robson Herring MD on 3/05/2025 at 3:44 PM     Finalized by (CST): Robson Herring MD on 3/05/2025 at 3:46 PM                    Lab Results   Component Value Date     WBC 12.0 (H) 03/05/2025     HGB 9.2 (L) 03/05/2025     .0 03/05/2025            Lab Results   Component Value Date      (H) 03/05/2025     BUN 21 03/05/2025     CREATSERUM 1.25 (H) 03/05/2025     GFRNAA 57 (L) 05/19/2022     GFRAA 65 05/19/2022         Assessment and Plan:  Diagnoses and all orders for this visit:     Primary osteoarthritis of right knee  -     Body Fluid Cult Aerobic and Anaerobic; Future  -     Cell Count/Diff & Crystals, Synovial; Future  -     Body Fluid Cult Aerobic and Anaerobic; Future  -     doxycycline 100 MG Oral Cap; Take 1 capsule (100 mg total) by mouth 2 (two) times daily.  -     sulfamethoxazole-trimethoprim -160 MG Oral Tab per tablet; Take 1 tablet by mouth 2 (two) times daily.  -     [facility] WOUND EVAL  -     Infectious Disease Referral - In Network     Pain  -     XR KNEE (3 VIEWS), RIGHT (CPT=73562); Future  -     doxycycline 100 MG Oral Cap; Take 1 capsule (100 mg total) by mouth 2 (two) times daily.  -     sulfamethoxazole-trimethoprim -160 MG Oral Tab per tablet; Take 1 tablet by mouth 2 (two) times daily.  -     [facility] WOUND EVAL     Infection associated with internal right knee prosthesis, subsequent encounter  -     Body Fluid Cult Aerobic and Anaerobic; Future  -     Cell Count/Diff & Crystals, Synovial; Future  -     Body Fluid Cult Aerobic and Anaerobic; Future  -     doxycycline 100 MG Oral Cap; Take 1 capsule (100 mg total) by mouth 2 (two) times daily.  -     sulfamethoxazole-trimethoprim -160 MG Oral Tab per tablet; Take 1 tablet by mouth 2 (two) times daily.  -     [facility] WOUND EVAL  -     Infectious Disease  Referral - In Network           Assessment: Superficial wound infection right knee replacement and possible deep knee replacement for about 9 to 10 days.  No fevers, chills, or sweats.     Plan: I spent quite a bit of time with her.  We probed the wound with the back of a Betadine stick.  It goes about 5 cm medially.  I did not seem to probe deep to the knee replacement however.  No creamy pus, but liquid purulence drainage not under pressure.     Using a 18-gauge needle sterile technique, aspiration of the suprapatellar pouch yielded only a little blood and no andres effusion.  No purulence was obvious.  I sent the swab from the wound and the aspiration from the knee separately for culture.     She is on Brilinta for several years.  She has not taken it today and I told her to hold for now.  We will talk to Dr. Peterson, her cardiologist to see if we can hold it for now as she will likely need surgery either this week or next.  I placed her on doxycycline and Bactrim DS both twice daily for now and also placed wound care consult and infectious disease consult.     I explained that we are trying to save her prosthesis although it may have to be removed depending on surgical findings.  If the infection is deep, we can try simple irrigation and debridement with a drain since this is only 9 days old.  I told her if this fails, she will have to have the prosthesis removed.  Will try to put her on the schedule later this week if we can get clearance.     Follow Up: No follow-ups on file.     Houston Rivera MD

## 2025-03-18 ENCOUNTER — HOSPITAL ENCOUNTER (INPATIENT)
Facility: HOSPITAL | Age: 75
LOS: 3 days | Discharge: NURSING, REHAB OR LONG-TERM CARE | End: 2025-03-21
Attending: ORTHOPAEDIC SURGERY | Admitting: ORTHOPAEDIC SURGERY
Payer: MEDICARE

## 2025-03-18 ENCOUNTER — ANESTHESIA (OUTPATIENT)
Dept: SURGERY | Facility: HOSPITAL | Age: 75
End: 2025-03-18
Payer: MEDICARE

## 2025-03-18 ENCOUNTER — APPOINTMENT (OUTPATIENT)
Dept: GENERAL RADIOLOGY | Facility: HOSPITAL | Age: 75
End: 2025-03-18
Attending: ORTHOPAEDIC SURGERY
Payer: MEDICARE

## 2025-03-18 ENCOUNTER — ANESTHESIA EVENT (OUTPATIENT)
Dept: SURGERY | Facility: HOSPITAL | Age: 75
End: 2025-03-18
Payer: MEDICARE

## 2025-03-18 DIAGNOSIS — Z01.818 PRE-OP TESTING: Primary | ICD-10-CM

## 2025-03-18 DIAGNOSIS — M17.11 PRIMARY OSTEOARTHRITIS OF RIGHT KNEE: ICD-10-CM

## 2025-03-18 DIAGNOSIS — T84.53XD INFECTION ASSOCIATED WITH INTERNAL RIGHT KNEE PROSTHESIS, SUBSEQUENT ENCOUNTER: ICD-10-CM

## 2025-03-18 PROBLEM — T84.53XA INFECTION OF TOTAL RIGHT KNEE REPLACEMENT: Status: ACTIVE | Noted: 2025-03-18

## 2025-03-18 LAB
GLUCOSE BLDC GLUCOMTR-MCNC: 148 MG/DL (ref 70–99)
GLUCOSE BLDC GLUCOMTR-MCNC: 170 MG/DL (ref 70–99)
GLUCOSE BLDC GLUCOMTR-MCNC: 239 MG/DL (ref 70–99)
GLUCOSE BLDC GLUCOMTR-MCNC: 300 MG/DL (ref 70–99)

## 2025-03-18 PROCEDURE — 0SHC08Z INSERTION OF SPACER INTO RIGHT KNEE JOINT, OPEN APPROACH: ICD-10-PCS | Performed by: ORTHOPAEDIC SURGERY

## 2025-03-18 PROCEDURE — 0SPC0JZ REMOVAL OF SYNTHETIC SUBSTITUTE FROM RIGHT KNEE JOINT, OPEN APPROACH: ICD-10-PCS | Performed by: ORTHOPAEDIC SURGERY

## 2025-03-18 PROCEDURE — 99223 1ST HOSP IP/OBS HIGH 75: CPT | Performed by: HOSPITALIST

## 2025-03-18 PROCEDURE — 73560 X-RAY EXAM OF KNEE 1 OR 2: CPT | Performed by: ORTHOPAEDIC SURGERY

## 2025-03-18 DEVICE — IMPLANTABLE DEVICE: Type: IMPLANTABLE DEVICE | Site: KNEE | Status: FUNCTIONAL

## 2025-03-18 DEVICE — IMPLANTABLE DEVICE
Type: IMPLANTABLE DEVICE | Site: KNEE | Status: FUNCTIONAL
Brand: JUGGERKNOT SOFT ANCHORS

## 2025-03-18 DEVICE — IMPLANTABLE DEVICE
Type: IMPLANTABLE DEVICE | Site: KNEE | Status: FUNCTIONAL
Brand: BIOMET® BONE CEMENT R

## 2025-03-18 RX ORDER — PREGABALIN 75 MG/1
300 CAPSULE ORAL NIGHTLY
Status: CANCELLED | OUTPATIENT
Start: 2025-03-18

## 2025-03-18 RX ORDER — MONTELUKAST SODIUM 10 MG/1
10 TABLET ORAL NIGHTLY
Status: CANCELLED | OUTPATIENT
Start: 2025-03-18

## 2025-03-18 RX ORDER — DIPHENHYDRAMINE HCL 25 MG
25 CAPSULE ORAL EVERY 4 HOURS PRN
Status: DISCONTINUED | OUTPATIENT
Start: 2025-03-18 | End: 2025-03-21

## 2025-03-18 RX ORDER — ACETAMINOPHEN 325 MG/1
650 TABLET ORAL EVERY 8 HOURS PRN
Status: DISCONTINUED | OUTPATIENT
Start: 2025-03-18 | End: 2025-03-21

## 2025-03-18 RX ORDER — MORPHINE SULFATE 4 MG/ML
2 INJECTION, SOLUTION INTRAMUSCULAR; INTRAVENOUS EVERY 10 MIN PRN
Status: DISCONTINUED | OUTPATIENT
Start: 2025-03-18 | End: 2025-03-18 | Stop reason: HOSPADM

## 2025-03-18 RX ORDER — NICOTINE POLACRILEX 4 MG
30 LOZENGE BUCCAL
Status: DISCONTINUED | OUTPATIENT
Start: 2025-03-18 | End: 2025-03-21

## 2025-03-18 RX ORDER — LOSARTAN POTASSIUM 25 MG/1
25 TABLET ORAL DAILY
Status: CANCELLED | OUTPATIENT
Start: 2025-03-18

## 2025-03-18 RX ORDER — DEXTROSE MONOHYDRATE 25 G/50ML
50 INJECTION, SOLUTION INTRAVENOUS
Status: DISCONTINUED | OUTPATIENT
Start: 2025-03-18 | End: 2025-03-21

## 2025-03-18 RX ORDER — ONDANSETRON 2 MG/ML
4 INJECTION INTRAMUSCULAR; INTRAVENOUS EVERY 6 HOURS PRN
Status: DISCONTINUED | OUTPATIENT
Start: 2025-03-18 | End: 2025-03-21

## 2025-03-18 RX ORDER — DIPHENHYDRAMINE HCL 25 MG
25 CAPSULE ORAL EVERY 6 HOURS PRN
Status: DISCONTINUED | OUTPATIENT
Start: 2025-03-18 | End: 2025-03-21

## 2025-03-18 RX ORDER — HYDROMORPHONE HYDROCHLORIDE 1 MG/ML
INJECTION, SOLUTION INTRAMUSCULAR; INTRAVENOUS; SUBCUTANEOUS AS NEEDED
Status: DISCONTINUED | OUTPATIENT
Start: 2025-03-18 | End: 2025-03-18 | Stop reason: SURG

## 2025-03-18 RX ORDER — NICOTINE POLACRILEX 4 MG
15 LOZENGE BUCCAL
Status: DISCONTINUED | OUTPATIENT
Start: 2025-03-18 | End: 2025-03-18 | Stop reason: HOSPADM

## 2025-03-18 RX ORDER — MORPHINE SULFATE 4 MG/ML
4 INJECTION, SOLUTION INTRAMUSCULAR; INTRAVENOUS EVERY 10 MIN PRN
Status: DISCONTINUED | OUTPATIENT
Start: 2025-03-18 | End: 2025-03-18 | Stop reason: HOSPADM

## 2025-03-18 RX ORDER — DIPHENHYDRAMINE HYDROCHLORIDE 50 MG/ML
12.5 INJECTION, SOLUTION INTRAMUSCULAR; INTRAVENOUS EVERY 4 HOURS PRN
Status: DISCONTINUED | OUTPATIENT
Start: 2025-03-18 | End: 2025-03-21

## 2025-03-18 RX ORDER — DIPHENHYDRAMINE HYDROCHLORIDE 50 MG/ML
25 INJECTION, SOLUTION INTRAMUSCULAR; INTRAVENOUS ONCE AS NEEDED
Status: ACTIVE | OUTPATIENT
Start: 2025-03-18 | End: 2025-03-18

## 2025-03-18 RX ORDER — DEXTROSE MONOHYDRATE 25 G/50ML
50 INJECTION, SOLUTION INTRAVENOUS
Status: DISCONTINUED | OUTPATIENT
Start: 2025-03-18 | End: 2025-03-18 | Stop reason: HOSPADM

## 2025-03-18 RX ORDER — VANCOMYCIN HYDROCHLORIDE 1 G/20ML
INJECTION, POWDER, LYOPHILIZED, FOR SOLUTION INTRAVENOUS AS NEEDED
Status: DISCONTINUED | OUTPATIENT
Start: 2025-03-18 | End: 2025-03-18 | Stop reason: HOSPADM

## 2025-03-18 RX ORDER — ROCURONIUM BROMIDE 10 MG/ML
INJECTION, SOLUTION INTRAVENOUS AS NEEDED
Status: DISCONTINUED | OUTPATIENT
Start: 2025-03-18 | End: 2025-03-18 | Stop reason: SURG

## 2025-03-18 RX ORDER — SENNOSIDES 8.6 MG
17.2 TABLET ORAL NIGHTLY
Status: DISCONTINUED | OUTPATIENT
Start: 2025-03-18 | End: 2025-03-21

## 2025-03-18 RX ORDER — ATORVASTATIN CALCIUM 20 MG/1
20 TABLET, FILM COATED ORAL NIGHTLY
Status: DISCONTINUED | OUTPATIENT
Start: 2025-03-18 | End: 2025-03-21

## 2025-03-18 RX ORDER — PANTOPRAZOLE SODIUM 40 MG/1
40 TABLET, DELAYED RELEASE ORAL
Status: CANCELLED | OUTPATIENT
Start: 2025-03-19

## 2025-03-18 RX ORDER — NICOTINE POLACRILEX 4 MG
30 LOZENGE BUCCAL
Status: DISCONTINUED | OUTPATIENT
Start: 2025-03-18 | End: 2025-03-18 | Stop reason: HOSPADM

## 2025-03-18 RX ORDER — SODIUM CHLORIDE 9 MG/ML
INJECTION, SOLUTION INTRAVENOUS CONTINUOUS
Status: DISCONTINUED | OUTPATIENT
Start: 2025-03-18 | End: 2025-03-21

## 2025-03-18 RX ORDER — DOCUSATE SODIUM 100 MG/1
100 CAPSULE, LIQUID FILLED ORAL DAILY PRN
Status: DISCONTINUED | OUTPATIENT
Start: 2025-03-18 | End: 2025-03-20

## 2025-03-18 RX ORDER — MIDAZOLAM HYDROCHLORIDE 1 MG/ML
INJECTION INTRAMUSCULAR; INTRAVENOUS AS NEEDED
Status: DISCONTINUED | OUTPATIENT
Start: 2025-03-18 | End: 2025-03-18 | Stop reason: SURG

## 2025-03-18 RX ORDER — NALOXONE HYDROCHLORIDE 0.4 MG/ML
0.08 INJECTION, SOLUTION INTRAMUSCULAR; INTRAVENOUS; SUBCUTANEOUS AS NEEDED
Status: DISCONTINUED | OUTPATIENT
Start: 2025-03-18 | End: 2025-03-18 | Stop reason: HOSPADM

## 2025-03-18 RX ORDER — NICOTINE POLACRILEX 4 MG
15 LOZENGE BUCCAL
Status: DISCONTINUED | OUTPATIENT
Start: 2025-03-18 | End: 2025-03-21

## 2025-03-18 RX ORDER — HYDROCODONE BITARTRATE AND ACETAMINOPHEN 10; 325 MG/1; MG/1
1 TABLET ORAL EVERY 4 HOURS PRN
Status: DISCONTINUED | OUTPATIENT
Start: 2025-03-18 | End: 2025-03-19

## 2025-03-18 RX ORDER — METOCLOPRAMIDE HYDROCHLORIDE 5 MG/ML
5 INJECTION INTRAMUSCULAR; INTRAVENOUS EVERY 8 HOURS PRN
Status: DISCONTINUED | OUTPATIENT
Start: 2025-03-18 | End: 2025-03-21

## 2025-03-18 RX ORDER — ACETAMINOPHEN 500 MG
1000 TABLET ORAL ONCE
Status: COMPLETED | OUTPATIENT
Start: 2025-03-18 | End: 2025-03-18

## 2025-03-18 RX ORDER — ALPRAZOLAM 0.25 MG
0.25 TABLET ORAL 4 TIMES DAILY PRN
Status: DISCONTINUED | OUTPATIENT
Start: 2025-03-18 | End: 2025-03-21

## 2025-03-18 RX ORDER — HYDROMORPHONE HYDROCHLORIDE 1 MG/ML
0.2 INJECTION, SOLUTION INTRAMUSCULAR; INTRAVENOUS; SUBCUTANEOUS EVERY 5 MIN PRN
Status: DISCONTINUED | OUTPATIENT
Start: 2025-03-18 | End: 2025-03-18 | Stop reason: HOSPADM

## 2025-03-18 RX ORDER — HYDROMORPHONE HYDROCHLORIDE 1 MG/ML
0.6 INJECTION, SOLUTION INTRAMUSCULAR; INTRAVENOUS; SUBCUTANEOUS EVERY 5 MIN PRN
Status: DISCONTINUED | OUTPATIENT
Start: 2025-03-18 | End: 2025-03-18 | Stop reason: HOSPADM

## 2025-03-18 RX ORDER — PREGABALIN 75 MG/1
300 CAPSULE ORAL NIGHTLY
Status: DISCONTINUED | OUTPATIENT
Start: 2025-03-18 | End: 2025-03-21

## 2025-03-18 RX ORDER — HYDROMORPHONE HYDROCHLORIDE 1 MG/ML
0.4 INJECTION, SOLUTION INTRAMUSCULAR; INTRAVENOUS; SUBCUTANEOUS EVERY 5 MIN PRN
Status: DISCONTINUED | OUTPATIENT
Start: 2025-03-18 | End: 2025-03-18 | Stop reason: HOSPADM

## 2025-03-18 RX ORDER — HYDRALAZINE HYDROCHLORIDE 20 MG/ML
INJECTION INTRAMUSCULAR; INTRAVENOUS AS NEEDED
Status: DISCONTINUED | OUTPATIENT
Start: 2025-03-18 | End: 2025-03-18 | Stop reason: SURG

## 2025-03-18 RX ORDER — SODIUM CHLORIDE, SODIUM LACTATE, POTASSIUM CHLORIDE, CALCIUM CHLORIDE 600; 310; 30; 20 MG/100ML; MG/100ML; MG/100ML; MG/100ML
INJECTION, SOLUTION INTRAVENOUS CONTINUOUS
Status: DISCONTINUED | OUTPATIENT
Start: 2025-03-18 | End: 2025-03-18 | Stop reason: HOSPADM

## 2025-03-18 RX ORDER — LOSARTAN POTASSIUM 25 MG/1
25 TABLET ORAL DAILY
Status: DISCONTINUED | OUTPATIENT
Start: 2025-03-19 | End: 2025-03-21

## 2025-03-18 RX ORDER — VANCOMYCIN HYDROCHLORIDE
15 ONCE
Status: COMPLETED | OUTPATIENT
Start: 2025-03-19 | End: 2025-03-19

## 2025-03-18 RX ORDER — MORPHINE SULFATE 10 MG/ML
6 INJECTION, SOLUTION INTRAMUSCULAR; INTRAVENOUS EVERY 10 MIN PRN
Status: DISCONTINUED | OUTPATIENT
Start: 2025-03-18 | End: 2025-03-18 | Stop reason: HOSPADM

## 2025-03-18 RX ORDER — ATORVASTATIN CALCIUM 20 MG/1
20 TABLET, FILM COATED ORAL NIGHTLY
Status: CANCELLED | OUTPATIENT
Start: 2025-03-18

## 2025-03-18 RX ORDER — TRANEXAMIC ACID 10 MG/ML
INJECTION, SOLUTION INTRAVENOUS AS NEEDED
Status: DISCONTINUED | OUTPATIENT
Start: 2025-03-18 | End: 2025-03-18 | Stop reason: SURG

## 2025-03-18 RX ORDER — MONTELUKAST SODIUM 10 MG/1
10 TABLET ORAL NIGHTLY
Status: DISCONTINUED | OUTPATIENT
Start: 2025-03-18 | End: 2025-03-21

## 2025-03-18 RX ORDER — DIPHENHYDRAMINE HCL 25 MG
25 CAPSULE ORAL NIGHTLY PRN
Status: DISCONTINUED | OUTPATIENT
Start: 2025-03-18 | End: 2025-03-21

## 2025-03-18 RX ORDER — TOBRAMYCIN 1.2 G/30ML
INJECTION, POWDER, LYOPHILIZED, FOR SOLUTION INTRAVENOUS AS NEEDED
Status: DISCONTINUED | OUTPATIENT
Start: 2025-03-18 | End: 2025-03-18 | Stop reason: HOSPADM

## 2025-03-18 RX ORDER — ONDANSETRON 2 MG/ML
INJECTION INTRAMUSCULAR; INTRAVENOUS AS NEEDED
Status: DISCONTINUED | OUTPATIENT
Start: 2025-03-18 | End: 2025-03-18 | Stop reason: SURG

## 2025-03-18 RX ORDER — PANTOPRAZOLE SODIUM 40 MG/1
40 TABLET, DELAYED RELEASE ORAL
Status: DISCONTINUED | OUTPATIENT
Start: 2025-03-19 | End: 2025-03-21

## 2025-03-18 RX ORDER — SODIUM CHLORIDE, SODIUM LACTATE, POTASSIUM CHLORIDE, CALCIUM CHLORIDE 600; 310; 30; 20 MG/100ML; MG/100ML; MG/100ML; MG/100ML
INJECTION, SOLUTION INTRAVENOUS CONTINUOUS
Status: DISCONTINUED | OUTPATIENT
Start: 2025-03-18 | End: 2025-03-18

## 2025-03-18 RX ORDER — CETIRIZINE HYDROCHLORIDE 10 MG/1
10 TABLET ORAL DAILY
Status: DISCONTINUED | OUTPATIENT
Start: 2025-03-19 | End: 2025-03-21

## 2025-03-18 RX ADMIN — HYDRALAZINE HYDROCHLORIDE 5 MG: 20 INJECTION INTRAMUSCULAR; INTRAVENOUS at 15:11:00

## 2025-03-18 RX ADMIN — HYDROMORPHONE HYDROCHLORIDE 0.5 MG: 1 INJECTION, SOLUTION INTRAMUSCULAR; INTRAVENOUS; SUBCUTANEOUS at 14:13:00

## 2025-03-18 RX ADMIN — SODIUM CHLORIDE, SODIUM LACTATE, POTASSIUM CHLORIDE, CALCIUM CHLORIDE: 600; 310; 30; 20 INJECTION, SOLUTION INTRAVENOUS at 17:05:00

## 2025-03-18 RX ADMIN — SODIUM CHLORIDE, SODIUM LACTATE, POTASSIUM CHLORIDE, CALCIUM CHLORIDE: 600; 310; 30; 20 INJECTION, SOLUTION INTRAVENOUS at 13:24:00

## 2025-03-18 RX ADMIN — TRANEXAMIC ACID 1000 MG: 10 INJECTION, SOLUTION INTRAVENOUS at 16:27:00

## 2025-03-18 RX ADMIN — HYDRALAZINE HYDROCHLORIDE 5 MG: 20 INJECTION INTRAMUSCULAR; INTRAVENOUS at 15:07:00

## 2025-03-18 RX ADMIN — HYDRALAZINE HYDROCHLORIDE 10 MG: 20 INJECTION INTRAMUSCULAR; INTRAVENOUS at 14:32:00

## 2025-03-18 RX ADMIN — MIDAZOLAM HYDROCHLORIDE 2 MG: 1 INJECTION INTRAMUSCULAR; INTRAVENOUS at 13:22:00

## 2025-03-18 RX ADMIN — ONDANSETRON 4 MG: 2 INJECTION INTRAMUSCULAR; INTRAVENOUS at 16:27:00

## 2025-03-18 RX ADMIN — HYDROMORPHONE HYDROCHLORIDE 0.5 MG: 1 INJECTION, SOLUTION INTRAMUSCULAR; INTRAVENOUS; SUBCUTANEOUS at 13:44:00

## 2025-03-18 RX ADMIN — ROCURONIUM BROMIDE 10 MG: 10 INJECTION, SOLUTION INTRAVENOUS at 13:24:00

## 2025-03-18 RX ADMIN — TRANEXAMIC ACID 1000 MG: 10 INJECTION, SOLUTION INTRAVENOUS at 13:39:00

## 2025-03-18 NOTE — INTERVAL H&P NOTE
Pre-op Diagnosis: Septic right knee replacement    The above referenced H&P was reviewed by Houston Rivera MD on 3/18/2025, the patient was examined and no significant changes have occurred in the patient's condition since the H&P was performed.  I discussed with the patient and/or legal representative the potential benefits, risks and side effects of this procedure; the likelihood of the patient achieving goals; and potential problems that might occur during recuperation.  I discussed reasonable alternatives to the procedure, including risks, benefits and side effects related to the alternatives and risks related to not receiving this procedure.  We will proceed with procedure as planned.

## 2025-03-18 NOTE — ANESTHESIA PREPROCEDURE EVALUATION
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) 04/20/2023    Atherosclerosis of coronary artery of native heart without angina pectoris 04/20/2023    Essential hypertension 04/20/2023    Hyperlipidemia 04/20/2023    Small bowel obstruction (HCC) 08/17/2021       Past Medical History:    Arthritis    Chronic sinusitis    halitosis    Coronary atherosclerosis    Diabetes (HCC)    Esophageal reflux    Essential hypertension    High blood pressure    High cholesterol    Hyperlipidemia    Osteoarthritis    Visual impairment    glasses       Past Surgical History:   Procedure Laterality Date    Cath drug eluting stent      Hysterectomy      Knee replacement surgery Left     Total knee replacement Right 06/2023       Prescriptions Prior to Admission[1]  Current Medications and Prescriptions Ordered in Epic[2]    Allergies[3]    Family History   Problem Relation Age of Onset    Cancer Father     Cancer Mother     Breast Cancer Mother      Social History     Socioeconomic History    Marital status:    Tobacco Use    Smoking status: Every Day     Types: Cigarettes    Smokeless tobacco: Never    Tobacco comments:     One cigarette a day   Vaping Use    Vaping status: Never Used   Substance and Sexual Activity    Alcohol use: No     Alcohol/week: 0.0 standard drinks of alcohol    Drug use: No   Other Topics Concern    Caffeine Concern Yes      Comment: coffee       Available pre-op labs reviewed.  Lab Results   Component Value Date    WBC 12.0 (H) 03/05/2025    RBC 3.25 (L) 03/05/2025    HGB 9.2 (L) 03/05/2025    HCT 27.5 (L) 03/05/2025    MCV 84.6 03/05/2025    MCH 28.3 03/05/2025    MCHC 33.5 03/05/2025    RDW 14.6 03/05/2025    .0 03/05/2025     Lab Results   Component Value Date     (L) 03/15/2025    K 4.5 03/15/2025     03/15/2025    CO2 22.0 03/15/2025    BUN 34 (H) 03/15/2025    CREATSERUM 1.61 (H) 03/15/2025     (H) 03/15/2025    CA 9.0 03/15/2025          Vital Signs:  Body mass index is 29.86 kg/m².   height is 1.676 m (5' 6\") and weight is 83.9 kg (185 lb).   Vitals:    03/14/25 1232   Weight: 83.9 kg (185 lb)   Height: 1.676 m (5' 6\")        Anesthesia Evaluation     Patient summary reviewed and Nursing notes reviewed    Airway   Dental      Pulmonary    Cardiovascular   Exercise tolerance: good  (+) hypertension, CAD, CABG/stent    ECG reviewed    Neuro/Psych      GI/Hepatic/Renal    (+) GERD    Endo/Other    (+) diabetes mellitus  Abdominal                  Anesthesia Plan:   ASA:  3  Plan:   General  Airway:  ETT  Post-op Pain Management: IV analgesics  Informed Consent Plan and Risks Discussed With:  Patient      I have informed Nina Baker and/or legal guardian or family member of the nature of the anesthetic plan, benefits, risks including possible dental damage if relevant, major complications, and any alternative forms of anesthetic management.   All of the patient's questions were answered to the best of my ability. The patient desires the anesthetic management as planned.  Nelda Redd MD  3/18/2025 12:11 PM  Present on Admission:  **None**           [1]   Medications Prior to Admission   Medication Sig Dispense Refill Last Dose/Taking    traMADol 50 MG Oral Tab Take 1 tablet (50 mg total) by mouth every 6 (six) hours as needed for Pain.   Taking As Needed    doxycycline 100 MG Oral Cap Take 1 capsule  (100 mg total) by mouth 2 (two) times daily. 20 capsule 0 Taking    sulfamethoxazole-trimethoprim -160 MG Oral Tab per tablet Take 1 tablet by mouth 2 (two) times daily. 30 tablet 0 Taking    [] ibuprofen 600 MG Oral Tab Take 1 tablet (600 mg total) by mouth every 6 (six) hours as needed. 28 tablet 0 Taking As Needed    loratadine 10 MG Oral Tab TAKE 1 TABLET BY MOUTH EVERY DAY 90 tablet 0 Taking    diphenhydrAMINE (BANOPHEN) 25 MG Oral Cap Take 1 capsule (25 mg total) by mouth nightly as needed for Sleep. 10 capsule 0 Past Month    Acetaminophen 500 MG Oral Cap Take 1-2 capsules (500-1,000 mg total) by mouth every 12 (twelve) hours as needed for Pain. Do not take more than 4000 mg of acetaminophen from all sources in 24 hours. 40 capsule 0 Taking As Needed    pantoprazole 40 MG Oral Tab EC Take 1 tablet (40 mg total) by mouth every morning before breakfast.   Taking    diphenhydrAMINE 25 MG Oral Cap Take 1 capsule (25 mg total) by mouth every 6 (six) hours as needed for Itching. 20 capsule 0 Past Month    aspirin 81 MG Oral Tab EC Take 1 tablet (81 mg total) by mouth 2 (two) times daily with meals. Take with breakfast and dinner for 1 month.  Then resume aspirin 81 mg once daily. 60 tablet 0 Taking    calcium carbonate-vitamin D 250-3.125 MG-MCG Oral Tab Take 1 tablet by mouth 2 (two) times daily with meals. 60 tablet 0 Taking    docusate sodium 100 MG Oral Cap Take 1 capsule by mouth daily as needed (constipation).   Taking As Needed    montelukast 10 MG Oral Tab Take 1 tablet (10 mg total) by mouth nightly. 30 tablet 3 Taking    ticagrelor (BRILINTA) 90 MG Oral Tab Take 1 tablet (90 mg total) by mouth every 12 (twelve) hours. 60 tablet 3 3/12/2025    atorvastatin 20 MG Oral Tab Take 1 tablet (20 mg total) by mouth nightly. 30 tablet 5 Taking    JANUVIA 100 MG Oral Tab Take 1 tablet (100 mg total) by mouth daily.   Taking    alprazolam 0.25 MG Oral Tab Take 1 tablet (0.25 mg total) by mouth 4 (four)  times daily as needed.  5 Taking As Needed    glipiZIDE 5 MG Oral Tab Take 2 tablets (10 mg total) by mouth 2 (two) times daily before meals.  2 Taking    losartan 25 MG Oral Tab Take 1 tablet (25 mg total) by mouth daily.  0 Taking    LYRICA 300 MG Oral Cap Take 1 capsule (300 mg total) by mouth nightly.  3 Taking   [2]   Current Facility-Administered Medications Ordered in Epic   Medication Dose Route Frequency Provider Last Rate Last Admin    lactated ringers infusion   Intravenous Continuous Houston Rivera MD        acetaminophen (Tylenol Extra Strength) tab 1,000 mg  1,000 mg Oral Once Houston Rivera MD        ceFAZolin (Ancef) 2g in 10mL IV syringe premix  2 g Intravenous Once Houston Rivera MD         No current [facility]-ordered outpatient medications on file.   [3]   Allergies  Allergen Reactions    Insulin Aspart ITCHING

## 2025-03-18 NOTE — ANESTHESIA POSTPROCEDURE EVALUATION
Patient: Nina Baker    Procedure Summary       Date: 03/18/25 Room / Location: [facility] MAIN OR  / [facility] MAIN OR    Anesthesia Start: 1320 Anesthesia Stop:     Procedure: Excisional debridement, explanted components, infected right knee replacement, and implantation of antibiotic cement spacers, wound vac placement (Right: Knee) Diagnosis: (Septic right knee replacement)    Surgeons: Houston Rivera MD Anesthesiologist: Salinas Gracia MD    Anesthesia Type: general ASA Status: 3            Anesthesia Type: general    Vitals Value Taken Time   /54 03/18/25 1729   Temp 98 03/18/25 1730   Pulse 78 03/18/25 1730   Resp 16 03/18/25 1730   SpO2 97 % 03/18/25 1730   Vitals shown include unfiled device data.    [facility] AN Post Evaluation:   Patient Evaluated in PACU  Patient Participation: complete - patient participated  Level of Consciousness: awake  Airway Patency:patent  Dental exam unchanged from preop  Yes    Nausea/Vomiting: none  Cardiovascular Status: acceptable  Respiratory Status: acceptable  Postoperative Hydration acceptable      Salinas Gracia MD  3/18/2025 5:30 PM

## 2025-03-18 NOTE — ANESTHESIA PROCEDURE NOTES
Airway  Date/Time: 3/18/2025 1:28 PM  Urgency: Elective      General Information and Staff    Patient location during procedure: OR  Anesthesiologist: Nelda Redd MD  Performed: anesthesiologist   Performed by: Nelda Redd MD  Authorized by: Nelda Redd MD      Indications and Patient Condition  Indications for airway management: anesthesia  Sedation level: deep  Preoxygenated: yes  Patient position: sniffing  Mask difficulty assessment: 1 - vent by mask    Final Airway Details  Final airway type: endotracheal airway      Successful airway: ETT  Cuffed: yes   Successful intubation technique: direct laryngoscopy  Endotracheal tube insertion site: oral  Blade: Maria Elena  Blade size: #3  ETT size (mm): 7.0    Cormack-Lehane Classification: grade I - full view of glottis  Placement verified by: capnometry   Measured from: teeth  ETT to teeth (cm): 21  Number of attempts at approach: 1

## 2025-03-18 NOTE — OPERATIVE REPORT
[facility]  part of formerly [facility]    Operative Note         Nina Baker Location: OR   CSN [id number] MRN [id number]   Admission Date 3/18/2025 Operation Date 3/18/2025   Attending Physician Houston Rivera MD       Patient Name: Nina Baker     Preoperative Diagnosis: Septic right knee replacement     Postoperative Diagnosis: Septic right knee replacement     Procedure(s):  Excisional debridement, explanted components, infected right knee replacement, and implantation of antibiotic cement spacers, wound vac placement     Primary Surgeon: Houston Rivera MD     Assistants: Page Caba PA-C (first assistant); Reese Liriano CSA (second assistant)    Anesthesia: General Endotracheal anesthesia by Dr. Redd.  Adductor block in recovery room.    Specimen:   ID Type Source Tests Collected by Time Destination   1 : 1. Right Knee Tissue for FROZEN section Tissue Knee, right SURGICAL PATHOLOGY TISSUE Houston Rivera MD 3/18/2025  2:01 PM    2 : 2. Right Knee Removed Hardware Tissue Knee, right SURGICAL PATHOLOGY TISSUE Houston Rivera MD 3/18/2025  3:47 PM         Estimated Blood Loss: Blood Output: 150 mL (3/18/2025  5:05 PM)   Complications: none      Indications for procedure: Patient is a 74-year-old woman for whom I replaced her right knee in April 2023.  She was doing well until about 3 to 4 weeks ago and developed drainage of the wound.  She was seen in the office and cultures were taken.  She was cleared medically for the operating room.  The risks and complication of surgery were discussed at the explanting the implants.  She agreed.  In addition I spoke with her in the preoperative area and again she was consenting to explanting the components should be necessary.  Interestingly, she had the left knee replaced by Dr. Melo several years ago and that became infected and had to be revised.    Surgical Findings: The sinus tract went all the way to the medial tibial plateau.  Frozen  section of the inner capsule around the knee replacement confirmed acute inflammation with greater than 5 WBCs per high-power field.  High number of neutrophils.    Complexity: Difficult case as it was a revision in a woman with poor kidney function who also carries a lot of her weight in her thigh and leg.    Operative Summary: Patient was brought to the operating room placed in the supine position.  Appropriate IV access and monitors were obtained.  As we already had culture from our office,.  Ancef 2 g IV and vancomycin 1 g IV were both given.  Tranexamic acid 1 g IV was given.  GET was performed by Dr. Redd.  The patient declined to spinal anesthesia.  SCD boots on the left leg.  Tourniquet was placed in the right thigh and the right lower extremity was then prepped draped in sterile fashion with Betadine scrub and paint.  Leg was exsanguinated and the tourniquet inflated to 300 mmHg.  Tourniquet time for the case was 105 minutes.    Incision was made in the sinus area was excised in an elliptical incision.  It was confirmed that the sinus tracked all the way to the medial tibial plateau.  Mid vastus snip was using an access to the knee.  No andres purulence was seen however.  Capsule from inside the knee was excised and sent for frozen section which did come back with greater than 5 WBCs per high-powered field with 5 number neutrophils.    The femur was excised using osteotomes.  I was able to remove it with some posterior bone loss but not much. Any remaining cement was removed including the peg holes.  Tibial removal started with osteotomes to the keel.  The L-shaped blades were then impacted from the posterior aspect.  An osteotome was placed on the posterior aspect of the stem.  The Edwin tibial plate extractor was assembled and the tibia came out but the stem extension was still in place.  Using osteotomes I broke apart the cement around this and all the cement and stem were then removed.  The bone all  seem to be healthy.  Any remaining cement on the proximal surface was removed with curettes.  Good living bone was seen remaining.  There was circumferential cortical bone remaining except for small portion in the very central posterior aspect.    The patella was removed starting with a saw and then using the pencil-tip bur to remove the pegs.  Remaining cement was removed with quarter inch curved osteotome.    Wounds were irrigated first with dilute Betadine solution, saline, and Bactisure.  This was followed with 6 L of saline.  Cement spacers were sized and 2 batches of methylmethacrylate cement with vancomycin and gentamicin were prepared in a bowl.  Cement was placed out of the backside of the components and cemented the stem is in place.  This stems himself however were not cemented.  Axial pressure was held while the cement hardened.    She was a little lax and we will place her in a brace but she was able to flex from 0 to 90 degrees pretty well.  Wounds irrigated 1 last time.  Closure was done in 45 degrees of flexion.  I used a Biomet all suture anchor to reinforce the tibial tubercle region.  A portion of the patella tendon had avulsed slightly but the majority was intact.  1/8 inch Hemovac was placed in the knee joint and out the lateral side of the thigh.  Capsule was closed with #1 Vicryl suture with antibiotic impregnated.  Deep layer was closed with neurological suture and then number to assist with antibiotic impregnated.  Lateral closure was done with 3-0 Vicryl and staples.  Prevena wound VAC dressing was placed.  Patient was then placed into a hinged knee brace set 0 to 40 degrees.  She was awakened, extubated, brought to recovery room in stable condition.      Implants:   Implant Name Type Inv. Item Serial No.  Lot No. LRB No. Used Action   CEMENT BNE 40GM HI VISC RADPQ BIOMET - SN/A  CEMENT BNE 40GM HI VISC RADPQ BIOMET N/A [employer] [id number] Right 2 Implanted   SPACER  STEM EXTN L100MM 12X8MM KNEE 0.1GM - SN/A  SPACER STEM EXTN L100MM 12X8MM KNEE 0.1GM N/A [employer] [facility] [place] [id number] Right 2 Implanted   SPACER FEM COMP M [id number] KNEE - SN/A  SPACER FEM COMP M [id number] KNEE N/A [employer] [facility] [place] [id number] Right 1 Implanted   SPACER TIB COMP M [id number] KNEE - SN/A  SPACER TIB COMP M [id number] KNEE N/A [employer] [facility] [place] [id number] Right 1 Implanted   ANCHOR SUT 1.45MM 2 SHT RIG DRLBT - SN/A  ANCHOR SUT 1.45MM 2 SHT RIG DRLBT N/A [employer]  [id number] Right 1 Implanted        Drains: Hemovac to right knee    Condition: Stable to PACU.      Houston Rivera MD

## 2025-03-18 NOTE — CONSULTS
[facility]    PATIENT'S NAME: GEORGIA ESPINO   ATTENDING PHYSICIAN: Houston Rivera MD   CONSULTING PHYSICIAN: Susu Esposito MD   PATIENT ACCOUNT#:   [id number]    LOCATION:  Critical access hospital PACU 2 [facility] 10  MEDICAL RECORD #:   [id number]       YOB: 1950  ADMISSION DATE:       03/18/2025      CONSULT DATE:  03/18/2025    REPORT OF CONSULTATION      REASON FOR ADMISSION:  Post right knee prosthetic joint explantation and antibiotic spacer placement.     HISTORY OF PRESENT ILLNESS:  Patient is a 74-year-old  female with history of right total knee arthroplasty in April 2023.  Two to 3 weeks ago, started developing itching and blisters around the surgical wound and started developing drainage.  She was evaluated by her orthopedic surgeon, Dr. Rivera, who scheduled her today for above-mentioned procedure for irrigation and debridement initially.  Intraoperatively, she was found to have prosthetic joint infection, hardware was explanted, and had an antibiotic spacer placed, deep cultures were obtained, and transferred to PACU for further monitoring.    PAST MEDICAL HISTORY:  Generalized osteoarthritis, diabetes mellitus type 2, hypertension, hyperlipidemia, gastroesophageal reflux disease, coronary artery disease, status post obtuse marginal and left circumflex PCI intervention.     PAST SURGICAL HISTORY:  Bilateral total knee arthroplasty, hysterectomy, multiple small-bowel obstructions treated conservatively.     MEDICATIONS:  Please see medication reconciliation list.      ALLERGIES:  Penicillin.    SOCIAL HISTORY:  Chronic tobacco use.  No alcohol or drug use.  Independent in her basic activities of daily living.     FAMILY HISTORY:  Mother had breast cancer.  Father had cancer.    REVIEW OF SYSTEMS:  Currently resting in bed.  No right knee pain.  No chest pain.  No shortness of breath.  Other 12-point review of systems is negative.        PHYSICAL EXAMINATION:    GENERAL:   Alert, oriented to time, place, and person.  Slightly drowsy postoperatively.  No acute distress.   VITAL SIGNS:  Temperature 97.8, pulse 74, respiratory rate 15, blood pressure 140/58, pulse ox 95% on room air.    HEENT:  Atraumatic.  Oropharynx clear.  Dry mucous membranes.   NECK:  Supple.  No lymphadenopathy.  Trachea midline.    LUNGS:  Clear to auscultation bilaterally.  Normal respiratory effort.   HEART:  Regular rate and rhythm.  S1, S2 auscultated.  No murmur.    ABDOMEN:  Soft, nondistended.  No tenderness.  Positive bowel sounds.    EXTREMITIES:  Right knee dressing, Hemovac surgical drain.  No leg edema.  No clubbing or cyanosis.   NEUROLOGIC:  Motor and sensory intact.    ASSESSMENT AND PLAN:    1.   Right knee prosthetic joint infection, status post excisional debridement, explantation of components, and implantation of antibiotic spacer.  Wound VAC placement.  Pain control.  Xarelto for DVT prophylaxis.  IV vancomycin.  Follow up on intraoperative cultures.  2.   Diabetes mellitus type 2.  Hold glipizide.  Continue Januvia.  Monitor Accu-Cheks.  Sliding scale insulin.  3.   Coronary artery disease.  Hold aspirin and Brilinta.  Continue other home medications.  Monitor.  4.   Essential hypertension.  Continue home medications and monitor.  5.   Hyperlipidemia.  Continue home medications.    Dictated By Susu Esposito MD  d: 03/18/2025 18:28:08  t: 03/18/2025 18:43:48  Job [id number]  FB/

## 2025-03-19 PROBLEM — Z01.818 PRE-OP TESTING: Status: ACTIVE | Noted: 2025-03-19

## 2025-03-19 LAB
ANION GAP SERPL CALC-SCNC: 5 MMOL/L (ref 0–18)
BUN BLD-MCNC: 30 MG/DL (ref 9–23)
BUN/CREAT SERPL: 23.1 (ref 10–20)
CALCIUM BLD-MCNC: 8.6 MG/DL (ref 8.7–10.4)
CHLORIDE SERPL-SCNC: 108 MMOL/L (ref 98–112)
CO2 SERPL-SCNC: 21 MMOL/L (ref 21–32)
CREAT BLD-MCNC: 1.3 MG/DL
EGFRCR SERPLBLD CKD-EPI 2021: 43 ML/MIN/1.73M2 (ref 60–?)
GLUCOSE BLD-MCNC: 158 MG/DL (ref 70–99)
GLUCOSE BLDC GLUCOMTR-MCNC: 162 MG/DL (ref 70–99)
GLUCOSE BLDC GLUCOMTR-MCNC: 217 MG/DL (ref 70–99)
GLUCOSE BLDC GLUCOMTR-MCNC: 242 MG/DL (ref 70–99)
GLUCOSE BLDC GLUCOMTR-MCNC: 288 MG/DL (ref 70–99)
HCT VFR BLD AUTO: 24.7 %
HGB BLD-MCNC: 8.1 G/DL
OSMOLALITY SERPL CALC.SUM OF ELEC: 287 MOSM/KG (ref 275–295)
POTASSIUM SERPL-SCNC: 4.8 MMOL/L (ref 3.5–5.1)
SODIUM SERPL-SCNC: 134 MMOL/L (ref 136–145)

## 2025-03-19 PROCEDURE — 99233 SBSQ HOSP IP/OBS HIGH 50: CPT | Performed by: HOSPITALIST

## 2025-03-19 RX ORDER — OXYCODONE AND ACETAMINOPHEN 10; 325 MG/1; MG/1
1 TABLET ORAL EVERY 6 HOURS PRN
Status: DISCONTINUED | OUTPATIENT
Start: 2025-03-19 | End: 2025-03-21

## 2025-03-19 RX ORDER — HYDROMORPHONE HYDROCHLORIDE 1 MG/ML
0.2 INJECTION, SOLUTION INTRAMUSCULAR; INTRAVENOUS; SUBCUTANEOUS EVERY 2 HOUR PRN
Status: DISCONTINUED | OUTPATIENT
Start: 2025-03-19 | End: 2025-03-21

## 2025-03-19 RX ORDER — OXYCODONE AND ACETAMINOPHEN 10; 325 MG/1; MG/1
1 TABLET ORAL EVERY 4 HOURS PRN
Status: DISCONTINUED | OUTPATIENT
Start: 2025-03-19 | End: 2025-03-19

## 2025-03-19 RX ORDER — HYDROCODONE BITARTRATE AND ACETAMINOPHEN 10; 325 MG/1; MG/1
1 TABLET ORAL EVERY 6 HOURS PRN
Qty: 25 TABLET | Refills: 0 | Status: SHIPPED | OUTPATIENT
Start: 2025-03-19 | End: 2025-03-21

## 2025-03-19 NOTE — CM/SW NOTE
Cm was asked to help with dc planning.    Anticipated therapy need: Gradual Rehabilitative Therapy    PASRR previously done.  Pt case sent to [facility] for review.    Cm met w/ pt. Pt initially preferred to dc to home however, is now understanding need and agreeable to [facility].    List provided and reviewed. Pt wants to discuss with her dtr before making final decision however, she is considering [facility] and semi private room.    Pt is aware of need for 3 [place] inpt for medicare to cover.    Cm notified PP of above.    Plan  Pending confirmed [facility] location and 3 [place]    / to remain available for support and/or discharge planning.     Sri Alaniz, RN    Ext [phone]

## 2025-03-19 NOTE — PLAN OF CARE
Nina is from home with her daughter, alert and oriented x 4 . Pod 1 left knee debridement, hardware removal, placement of spacer, locked hinge knee brace in place and on rle at all times-patent hemovac/prevena drsg in place, ac/hs glucose monitoring -see emar, jonathan diet, lbm 3/18-passing flatus, voiding via purewick and brp with RC, oob with 2 assists/RW/RC, fall precautions in place, xarelto/scd lle, norco/dilaudid prn pain, iv ancef/vanco-ID consulted, SNF list provided to patient-SW to f/u tomorrow  Problem: Patient Centered Care  Goal: Patient preferences are identified and integrated in the patient's plan of care  Description: Interventions:- What would you like us to know as we care for you? I've been to [facility] for my left tkr  - Provide timely, complete, and accurate information to patient/family- Incorporate patient and family knowledge, values, beliefs, and cultural backgrounds into the planning and delivery of care- Encourage patient/family to participate in care and decision-making at the level they choose- Honor patient and family perspectives and choices  3/19/2025 1708 by Sandy Jefferson, RN  Outcome: Progressing  3/19/2025 1548 by Sandy Jefferson, RN  Outcome: Progressing     Problem: Diabetes/Glucose Control  Goal: Glucose maintained within prescribed range  Description: INTERVENTIONS:- Monitor Blood Glucose as ordered- Assess for signs and symptoms of hyperglycemia and hypoglycemia- Administer ordered medications to maintain glucose within target range- Assess barriers to adequate nutritional intake and initiate nutrition consult as needed- Instruct patient on self management of diabetes  3/19/2025 1708 by Sandy Jefferson, RN  Outcome: Progressing  3/19/2025 1548 by Sandy Jefferson, RN  Outcome: Progressing     Problem: Patient/Family Goals  Goal: Patient/Family Long Term Goal  Description: Patient's Long Term Goal: return to baseline adls  Interventions:-   Pt/ot  Pain control  Immobilizer  Use of rec equip  -  See additional Care Plan goals for specific interventions  3/19/2025 1708 by Sandy Jefferson RN  Outcome: Progressing  3/19/2025 1548 by Sandy Jefferson RN  Outcome: Progressing  Goal: Patient/Family Short Term Goal  Description: Patient's Short Term Goal: rehab this week  Interventions: - sw to coordiante  Will need to remain in hospital 3 midnights  Pain well controlled  Karoline diet  Vs/labs wnl  - See additional Care Plan goals for specific interventions  3/19/2025 1708 by Sandy Jefferson RN  Outcome: Progressing  3/19/2025 1548 by Sandy Jefferson RN  Outcome: Progressing     Problem: PAIN - ADULT  Goal: Verbalizes/displays adequate comfort level or patient's stated pain goal  Description: INTERVENTIONS:- Encourage pt to monitor pain and request assistance- Assess pain using appropriate pain scale- Administer analgesics based on type and severity of pain and evaluate response- Implement non-pharmacological measures as appropriate and evaluate response- Consider cultural and social influences on pain and pain management- Manage/alleviate anxiety- Utilize distraction and/or relaxation techniques- Monitor for opioid side effects- Notify MD/LIP if interventions unsuccessful or patient reports new pain- Anticipate increased pain with activity and pre-medicate as appropriate  3/19/2025 1708 by Sandy Jefferson RN  Outcome: Progressing  3/19/2025 1548 by Sandy Jefferson RN  Outcome: Progressing     Problem: RISK FOR INFECTION - ADULT  Goal: Absence of fever/infection during anticipated neutropenic period  Description: INTERVENTIONS- Monitor WBC- Administer growth factors as ordered- Implement neutropenic guidelines  3/19/2025 1708 by Sandy Jefferson RN  Outcome: Progressing  3/19/2025 1548 by Sandy Jefferson RN  Outcome: Progressing     Problem: SAFETY ADULT - FALL  Goal: Free from fall injury  Description: INTERVENTIONS:- Assess pt frequently for physical needs- Identify cognitive and physical deficits and behaviors that affect risk of falls.- [place]  fall precautions as indicated by assessment.- Educate pt/family on patient safety including physical limitations- Instruct pt to call for assistance with activity based on assessment- Modify environment to reduce risk of injury- Provide assistive devices as appropriate- Consider OT/PT consult to assist with strengthening/mobility- Encourage toileting schedule  3/19/2025 1708 by Sandy Jefferson, RN  Outcome: Progressing  3/19/2025 1548 by Sandy Jefferson, RN  Outcome: Progressing     Problem: DISCHARGE PLANNING  Goal: Discharge to home or other facility with appropriate resources  Description: INTERVENTIONS:- Identify barriers to discharge w/pt and caregiver- Include patient/family/discharge partner in discharge planning- Arrange for needed discharge resources and transportation as appropriate- Identify discharge learning needs (meds, wound care, etc)- Arrange for interpreters to assist at discharge as needed- Consider post-discharge preferences of patient/family/discharge partner- Complete POLST form as appropriate- Assess patient's ability to be responsible for managing their own health- Refer to Case Management Department for coordinating discharge planning if the patient needs post-hospital services based on physician/LIP order or complex needs related to functional status, cognitive ability or social support system  3/19/2025 1708 by Sandy Jefferson, RN  Outcome: Progressing  3/19/2025 1548 by Sandy Jefferson, RN  Outcome: Progressing

## 2025-03-19 NOTE — CM/SW NOTE
Department  notified of request for HHC, aidin referrals started. Assigned CM/SW to follow up with pt/family on further discharge planning.     Ingrid Lyon, DSC

## 2025-03-19 NOTE — PROGRESS NOTES
[facility]  part of [facility]    Progress Note    Nina Baker Patient Status:  Inpatient    1950 MRN [id number]   Location [facility] 4W/SW/SE Attending [place] Rivera MD   Hosp Day # 1 PCP JESSICA AGUIRRE MD       Subjective:   Subjective:  Patient awake, lying in bed.  Large neck she is doing okay.  Pain currently well-managed.  Denies any numbness or tingling.  Objective:   Blood pressure 154/71, pulse 70, temperature 97.9 °F (36.6 °C), temperature source Temporal, resp. rate 16, height 5' 6\" (1.676 m), weight 184 lb (83.5 kg), SpO2 99%.  Physical Exam  Back on, clean and dry.  Hinged knee brace at 0 to 40 degrees.  Lower extremity skin healthy, no pitting edema.  Bilateral calf soft nontender.  Able to dorsiflex and plantarflex against resistance bilaterally.  Unable to do straight leg raise.  Able to bend her knee 0 to 40 degrees.  Denies numbness and tingling.    Results:   Lab Results   Component Value Date    WBC 12.0 (H) 2025    HGB 8.1 (L) 2025    HCT 24.7 (L) 2025    .0 2025    CREATSERUM 1.30 (H) 2025    BUN 30 (H) 2025     (L) 2025    K 4.8 2025     2025    CO2 21.0 2025     (H) 2025    CA 8.6 (L) 2025    ALB 4.0 03/15/2025    ALKPHO 125 03/15/2025    BILT 0.2 03/15/2025    TP 7.8 03/15/2025    AST 16 03/15/2025    ALT 17 03/15/2025    INR 0.97 2023    LIP 19 2023    ESRML 50 (H) 2025    CRP 24.30 (H) 2025    MG 2.3 2021    TROP <0.045 06/10/2020    TROPHS 12 2023    CK 69 03/15/2025       XR KNEE (1 OR 2 VIEWS), RIGHT (CPT=73560)    Result Date: 3/18/2025  CONCLUSION:  1. Knee prosthesis explanted.  Antibiotic impregnated cement spacers in place.  Anatomic alignment.    Dictated by (CST): Robson Herring MD on 3/18/2025 at 6:43 PM     Finalized by (CST): Robson Herring MD on 3/18/2025 at 6:45 PM               Assessment & Plan:      Infection of total right knee replacement  Patient postop day #1 excisional debridement, explant of components, and inflation of antibiotic cement spacers following septic right knee replacement.  Discussed with the patient she is toe-touch weightbearing.  At this time patient wishes to return home, but I discussed with the patient she may not be safe to do so.  She will begin working with physical therapy to see if she is safe to go home.  Patient is agreeable to go to a subacute rehab if therapy deems her unsafe to discharge home.  Discussed hinged knee brace will stay 0 to 40 degrees.  Continue IV vancomycin.      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

## 2025-03-19 NOTE — PLAN OF CARE
Patient is alert and oriented. On RA. SCDs on for DVT prophylaxis. Voiding freely, using purewick. . PRN norco and dilaudid given for pain management. Gel ice packs. Upon arrival from PACU patients prevena wound vac was alarming, attempted troubleshooting, let MD know, unable to troubleshoot, removed prevena and replaced with new wound vac sponge. . ACHS accucheck. Call light within reach, bed alarm active.    Problem: Patient Centered Care  Goal: Patient preferences are identified and integrated in the patient's plan of care- Provide timely, complete, and accurate information to patient/family- Incorporate patient and family knowledge, values, beliefs, and cultural backgrounds into the planning and delivery of care- Encourage patient/family to participate in care and decision-making at the level they choose- Honor patient and family perspectives and choices  Outcome: Progressing     Problem: Diabetes/Glucose Control  Goal: Glucose maintained within prescribed range  Description: INTERVENTIONS:- Monitor Blood Glucose as ordered- Assess for signs and symptoms of hyperglycemia and hypoglycemia- Administer ordered medications to maintain glucose within target range- Assess barriers to adequate nutritional intake and initiate nutrition consult as needed- Instruct patient on self management of diabetes  Outcome: Progressing     Problem: PAIN - ADULT  Goal: Verbalizes/displays adequate comfort level or patient's stated pain goal  Description: INTERVENTIONS:- Encourage pt to monitor pain and request assistance- Assess pain using appropriate pain scale- Administer analgesics based on type and severity of pain and evaluate response- Implement non-pharmacological measures as appropriate and evaluate response- Consider cultural and social influences on pain and pain management- Manage/alleviate anxiety- Utilize distraction and/or relaxation techniques- Monitor for opioid side effects- Notify MD/LIP if interventions  unsuccessful or patient reports new pain- Anticipate increased pain with activity and pre-medicate as appropriate  Outcome: Progressing     Problem: RISK FOR INFECTION - ADULT  Goal: Absence of fever/infection during anticipated neutropenic period  Description: INTERVENTIONS- Monitor WBC- Administer growth factors as ordered- Implement neutropenic guidelines  Outcome: Progressing     Problem: SAFETY ADULT - FALL  Goal: Free from fall injury  Description: INTERVENTIONS:- Assess pt frequently for physical needs- Identify cognitive and physical deficits and behaviors that affect risk of falls.- [place] fall precautions as indicated by assessment.- Educate pt/family on patient safety including physical limitations- Instruct pt to call for assistance with activity based on assessment- Modify environment to reduce risk of injury- Provide assistive devices as appropriate- Consider OT/PT consult to assist with strengthening/mobility- Encourage toileting schedule  Outcome: Progressing     Problem: DISCHARGE PLANNING  Goal: Discharge to home or other facility with appropriate resources  Description: INTERVENTIONS:- Identify barriers to discharge w/pt and caregiver- Include patient/family/discharge partner in discharge planning- Arrange for needed discharge resources and transportation as appropriate- Identify discharge learning needs (meds, wound care, etc)- Arrange for interpreters to assist at discharge as needed- Consider post-discharge preferences of patient/family/discharge partner- Complete POLST form as appropriate- Assess patient's ability to be responsible for managing their own health- Refer to Case Management Department for coordinating discharge planning if the patient needs post-hospital services based on physician/LIP order or complex needs related to functional status, cognitive ability or social support system  Outcome: Progressing

## 2025-03-19 NOTE — CM/SW NOTE
CM requested department  (DSC) to initiate AIDIN referral for HHC and PETE.     PASRR done.  Pt case sent to [facility] for review.      SW/SEAN will continue to follow.     Regina Willis RN, BSN  Nurse   [phone]

## 2025-03-19 NOTE — PHYSICAL THERAPY NOTE
PHYSICAL THERAPY EVALUATION - INPATIENT     Room Number: 427/427-A  Evaluation Date: 3/19/2025  Type of Evaluation: Initial   Physician Order: PT Eval and Treat    Presenting Problem: s/p excisional debridement, explanted components, infection right knee replacement and implantation of antibiotic cement spacers, wound vac placement on 3/18     Reason for Therapy: Mobility Dysfunction and Discharge Planning    PHYSICAL THERAPY ASSESSMENT   Patient is a 74 year old female admitted 3/18/2025 for excisional debridement, explanted components, infection right knee replacement and implantation of antibiotic cement spacers, wound vac placement.  Prior to admission, patient's baseline is independent with ADLs and functional mobility with SPC.  Patient is currently functioning below baseline with bed mobility, transfers, gait, stair negotiation, standing prolonged periods, and performing household tasks.  Patient is requiring moderate assist x2 as a result of the following impairments: decreased functional strength, decreased endurance/aerobic capacity, pain, impaired dynamic standing balance, decreased muscular endurance, medical status, and limited R knee ROM and weight bearing restrictions.  Physical Therapy will continue to follow for duration of hospitalization.    Patient will benefit from continued skilled PT Services to promote return to prior level of function and safety with continuous assistance and gradual rehabilitative therapy .    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     PHYSICAL THERAPY EXAMINATION   OBJECTIVE  Precautions: Drain(s)  Fall Risk: High fall risk    WEIGHT BEARING RESTRICTION  R Lower Extremity: Toe Touch Weight Bearing    PAIN ASSESSMENT  Rating:  (did not rate)  Location: R knee  Management Techniques: Activity promotion, Body mechanics, Repositioning, Breathing techniques    COGNITION  Overall Cognitive Status:  WFL - within functional limits    RANGE OF MOTION AND STRENGTH ASSESSMENT  Upper extremity ROM and strength are within functional limits.  Lower extremity ROM is within functional limits.  Lower extremity strength is within functional limits.    BALANCE  Static Sitting: Good  Dynamic Sitting: Fair +  Static Standing: Poor +  Dynamic Standing: Poor    AM-PAC '6-Clicks' INPATIENT SHORT FORM - BASIC MOBILITY  How much difficulty does the patient currently have...  Patient Difficulty: Turning over in bed (including adjusting bedclothes, sheets and blankets)?: A Little   Patient Difficulty: Sitting down on and standing up from a chair with arms (e.g., wheelchair, bedside commode, etc.): A Lot   Patient Difficulty: Moving from lying on back to sitting on the side of the bed?: A Little   How much help from another person does the patient currently need...   Help from Another: Moving to and from a bed to a chair (including a wheelchair)?: A Lot   Help from Another: Need to walk in  hospital room?: A Lot   Help from Another: Climbing 3-5 steps with a railing?: A Lot     AM-PAC Score:  Raw Score: 14   Approx Degree of Impairment: 61.29%   Standardized Score (AM-PAC Scale): 38.1   CMS Modifier (G-Code): CL    FUNCTIONAL ABILITY STATUS  Functional Mobility/Gait Assessment  Gait Assistance: Moderate assistance (x2)  Distance (ft): 3  Assistive Device: Rolling walker  Pattern: Shuffle, R Decreased stance time (needs repeated cues to maintain TTWB on RLE, overall unsteady gait, cues for sequencing and management of RW)  Supine to Sit: minimal assist  Sit to Stand: moderate assist    Exercise/Education Provided:  Education Provided To: Patient     Patient Education: Role of Physical Therapy, Plan of Care, DME Recommendations, Functional Transfer Techniques, Fall Prevention, Weight Bear Status, Surgical Precautions, Posture/Positioning, Energy Conservation, Proper Body Mechanics, Gait Training     Patient's Response to Education: Verbalized Understanding, Requires Further Education     Skilled Therapy Provided: Pt received resting in bed and agreeable to activity. Educated on wearing hinged knee brace locked at 0-40, and educated on TTWB RLE. Pt c/o pain throughout session and yells out intermittently d/t increased pain. Able to transition supine>sit at EOB with HOB elevated and cues given to use bed rail, required increased time and min A. CGA for static sitting balance at EOB. Demos STS from bed with RW and mod A, and able to take steps to chair with RW and mod A x 2. Needs repeated cues to maintain safe hand placement, body mechanics and TTWB with transfer and steps. Pt was left sitting in chair with BLE elevated, needs within reach, handoff to RN complete.     The patient's Approx Degree of Impairment: 61.29% has been calculated based on documentation in the [facility] '6 clicks' Inpatient Basic Mobility Short Form.  Research supports that patients with this level of impairment may benefit from rehab  facility.  Final disposition will be made by interdisciplinary medical team.    Patient End of Session: Up in chair, Needs met, RN aware of session/findings, Call light within reach, All patient questions and concerns addressed, Hospital anti-slip socks    CURRENT GOALS  Goals to be met by: 3/29/25  Patient Goal Patient's self-stated goal is: go home   Goal #1 Patient is able to demonstrate supine - sit EOB @ level: supervision     Goal #1   Current Status    Goal #2 Patient is able to demonstrate transfers Sit to/from Stand at assistance level: supervision with walker - rolling     Goal #2  Current Status    Goal #3 Patient is able to ambulate 50 feet with assist device: walker - rolling at assistance level: supervision   Goal #3   Current Status    Goal #4 Stair goal TBD   Goal #4   Current Status    Goal #5 Patient to demonstrate independence with home activity/exercise instructions provided to patient in preparation for discharge.   Goal #5   Current Status    Goal #6    Goal #6  Current Status      Patient Evaluation Complexity Level:  History Moderate - 1 or 2 personal factors and/or co-morbidities   Examination of body systems Moderate - addressing a total of 3 or more elements   Clinical Presentation  Moderate - Evolving   Clinical Decision Making  Moderate Complexity     Therapeutic Activity:  20 minutes

## 2025-03-19 NOTE — CM/SW NOTE
Department  notified of request for jackie FREEMAN referrals started. Assigned CM/SW to follow up with pt/family on further discharge planning.     Ingrid Lyon, DSC

## 2025-03-19 NOTE — PROGRESS NOTES
[facility]  part of [facility]  Hospitalist Progress Note     Nina Baker Patient Status:  Inpatient    1950  74 year old CSN [id number]   Location 427/427-A Attending Chip Foreman MD   Hosp Day # 1 PCP JESSICA AGUIRRE MD     Assessment & Plan:   ----------------------------------  Prosthetic joint infection, right knee.  Status post explantation, antibiotic spacer 3/18.  Overall doing well.  -PT/OT  -IV vancomycin  -ID consult  -Orthopedics following  -Subacute rehab versus home  -Monitor cultures    Other problems  Type 2 diabetes  Coronary artery disease  Hypertension  Dyslipidemia  CKD 3    Supplementary Documentation:   DVT Mechanical Prophylaxis: KENNY hose, SCDs, Early ambuation  DVT Pharmacologic Prophylaxis   Medication    rivaroxaban (Xarelto) tab 10 mg                Code Status: Full Code  F[place]: No urinary catheter in place  [place] Duration (in days):   Central line:    DARLIN: 3/20/2025        **Certification      PHYSICIAN Certification of Need for Inpatient Hospitalization - Initial Certification    Patient will require inpatient services that will reasonably be expected to span two midnight's based on the clinical documentation in H+P.   Based on patients current state of illness, I anticipate that, after discharge, patient will require TBD.           I personally reviewed the available laboratories, imaging including. I discussed/will discuss the case with consultants. I ordered laboratories and/or radiographic studies. I adjusted medications as detailed above.  Medical decision making high, risk is high.    Subjective:   ----------------------------------  Pain moderate, she was able to walk a little bit with PT.  No chest pain shortness of breath.      Objective:   Chief Complaint: No chief complaint on file.    ----------------------------------  Temp:  [97.4 °F (36.3 °C)-98.1 °F (36.7 °C)] 98 °F (36.7 °C)  Pulse:  [68-94] 90  Resp:  [10-18] 18  BP: (125-187)/(54-77)  125/67  SpO2:  [95 %-100 %] 98 %  Gen: A+Ox3.  No distress.   HEENT: NCAT, neck supple, no carotid bruit.  CV: RRR, S1S2, and intact distal pulses. No gallop, rub, murmur.  Pulm: Effort and breath sounds normal. No distress, wheezes, rales, rhonchi.  Abd: Soft, NTND, BS normal, no mass, no HSM, no rebound/guarding.   Neuro: Normal reflexes, CN. Sensory/motor exams grossly normal deficit.   MS: No joint effusions.  Right leg in immobilizer, Ace bandage.  Wound VAC in place  Skin: Skin is warm and dry. No rashes, erythema, diaphoresis.   Psych: Normal mood and affect. Calm, cooperative    Labs:  Lab Results   Component Value Date    HGB 8.1 (L) 03/19/2025    WBC 12.0 (H) 03/05/2025    .0 03/05/2025     (L) 03/19/2025    K 4.8 03/19/2025    CREATSERUM 1.30 (H) 03/19/2025    INR 0.97 01/03/2023    AST 16 03/15/2025    ALT 17 03/15/2025    TROP <0.045 06/10/2020            calcium carbonate-vitamin D  1 tablet Oral BID with meals    cetirizine  10 mg Oral Daily    sennosides  17.2 mg Oral Nightly    rivaroxaban  10 mg Oral Q24H    ceFAZolin  2 g Intravenous Q8H    atorvastatin  20 mg Oral Nightly    linaGLIPtin  5 mg Oral Daily    losartan  25 mg Oral Daily    pregabalin  300 mg Oral Nightly    pantoprazole  40 mg Oral QAM AC    montelukast  10 mg Oral Nightly    vancomycin  15 mg/kg Intravenous Once    insulin regular human  1-7 Units Subcutaneous TID AC and HS       HYDROmorphone    diphenhydrAMINE    diphenhydrAMINE    docusate sodium    glucose **OR** glucose **OR** glucose-vitamin C **OR** dextrose **OR** glucose **OR** glucose **OR** glucose-vitamin C    sodium chloride    ondansetron    metoclopramide    diphenhydrAMINE **OR** diphenhydrAMINE    acetaminophen    HYDROcodone-acetaminophen    ALPRAZolam

## 2025-03-19 NOTE — OCCUPATIONAL THERAPY NOTE
OCCUPATIONAL THERAPY EVALUATION - INPATIENT     Room Number: 427/427-A  Evaluation Date: 3/19/2025  Type of Evaluation: Initial       Physician Order: IP Consult to Occupational Therapy  Reason for Therapy: ADL/IADL Dysfunction and Discharge Planning    OCCUPATIONAL THERAPY ASSESSMENT   RN cleared pt for participation in OT session, which was completed in collaboration with PT. Upon arrival, pt was supine in bed and agreeable to activity. No visitors present during session. Pt was left in chair. Call light and all needs left in reach. Handoff given to RN.    Patient is a 74 year old female admitted 3/18/2025 for Excisional debridement, explanted components, infected right knee replacement, and implantation of antibiotic cement spacers, wound vac placement (Right: Knee) .  Prior to admission, pt was independent with SPC.  Patient is currently requiring up to max A for ADLs overall along with min A for supine to sit, CGA for sitting at EOB, mod A for STS, and mod A of 2 for functional transfer at RW level (therapist initially holding R foot off floor to maintain). Pt tolerated about 1 minutes of <1 supported standing.  Pt has the following impairments: decreased functional strength, decreased functional reach, decreased endurance, pain, and impaired standing balance.    Education provided  Educated pt about role of OT and hospital therapy process as well as proper safety techniques including proper hand placement, body mechanics, safety techniques, RLE TTWB status. Pt verbalized/demonstrated fair carryover.  Physical and verbal cues to maintain TTWB**  Patient will benefit from continued skilled OT Services to promote return to prior level of function and safety with continuous assistance and gradual rehabilitative therapy    PLAN  OT Treatment Plan: Balance activities;Energy conservation/work simplification techniques;Continued evaluation;Compensatory technique education;Fine motor coordination  activities;Neuromuscluar reeducation;Equipment eval/education;Patient/Family training;Patient/Family education;Cognitive reorientation;Endurance training;UE strengthening/ROM;Functional transfer training;Visual perceptual training;IADL training;ADL training      OCCUPATIONAL THERAPY MEDICAL/SOCIAL HISTORY     Problem List  Principal Problem:    Infection of total right knee replacement  Active Problems:    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)    Atherosclerosis of coronary artery of native heart without angina pectoris    Essential hypertension    Hyperlipidemia    Infection associated with internal right knee prosthesis, subsequent encounter    Pre-op testing      Past Medical History  Past Medical History:    Arthritis    Chronic sinusitis    halitosis    Coronary atherosclerosis    Diabetes (HCC)    Esophageal reflux    Essential hypertension    High blood pressure    High cholesterol    Hyperlipidemia    Osteoarthritis    Visual impairment    glasses       Past Surgical History  Past Surgical History:   Procedure Laterality Date    Cath drug eluting stent      Hysterectomy      Knee replacement surgery Left     Total knee replacement Right 06/2023       HOME SITUATION  Type of Home: House  Home Layout: Two level  Lives With: Daughter                              SUBJECTIVE  \"I am so nervous.\"    OCCUPATIONAL THERAPY EXAMINATION      OBJECTIVE  Precautions: Drain(s)  Fall Risk: High fall risk    PAIN ASSESSMENT  Rating: Unable to rate              COORDINATION  Gross Motor: WFL   Fine Motor: WFL     ACTIVITIES OF DAILY LIVING ASSESSMENT  AM-[facility] ‘6-Clicks’ Inpatient Daily Activity Short Form  How much help from another person does the patient currently need…  -   Putting on and taking off regular lower body clothing?: A Lot  -   Bathing (including washing, rinsing, drying)?: A Lot  -   Toileting, which includes using toilet, bedpan or urinal? : A Lot  -   Putting on and taking off regular upper  body clothing?: A Little  -   Taking care of personal grooming such as brushing teeth?: A Little  -   Eating meals?: None    AM-PAC Score:  Score: 16  Approx Degree of Impairment: 53.32%  Standardized Score (AM-PAC Scale): 35.96  CMS Modifier (G-Code): CK      FUNCTIONAL TRANSFER ASSESSMENT    Supine to sit: min A  Sit to stand: mod A  Toilet Transfer: mod A of 2  Chair Transfer: mod A of 2    FUNCTIONAL ADL ASSESSMENT  Overall max A    The patient's Approx Degree of Impairment: 53.32% has been calculated based on documentation in the [facility] '6 clicks' Inpatient Daily Activity Short Form.  Research supports that patients with this level of impairment may benefit from GR. Final disposition will be made by interdisciplinary medical team.    OT Goals  Patients self stated goal is: to get stronger     Patient will complete functional transfer with CGA  Comment:     Patient will complete toileting with CGA  Comment:     Patient will tolerate standing for 1 minutes in prep for adls with CGA   Comment:    Patient will complete item retrieval with CGA  Comment:          Goals  on:   Frequency: 3-5x/wk    Patient Evaluation Complexity Level:   Occupational Profile/Medical History MODERATE - Expanded review of history including review of medical or therapy record   Specific performance deficits impacting engagement in ADL/IADL MODERATE  3 - 5 performance deficits   Client Assessment/Performance Deficits MODERATE - Comorbidities and min to mod modifications of tasks    Clinical Decision Making MODERATE - Analysis of occupational profile, detailed assessments, several treatment options    Overall Complexity MODERATE     OT Session Time: 20 minutes  Self-Care Home Management: 10 minutes        Linda Scott OT  [facility]  Inpatient Rehabilitation  Occupational Therapy  [phone]

## 2025-03-19 NOTE — CONSULTS
Spiritual Care Visit Note    Patient Name: Nina Baker Date of Spiritual Care Visit: 25   : 1950 Primary Dx: Infection of total right knee replacement       Referred By: Referral From: Care Coordination    Spiritual Care Taxonomy:    Intended Effects: Demonstrate caring and concern    Methods: Collaborate with care team member, Offer support    Interventions: Active listening, Ask guided questions, Assist someone with Advance Directives, Silent prayer    Visit Type/Summary:     - Spiritual Care: Consulted with RN prior to visit. Offered empathic listening and emotional support. Patient and family expressed appreciation for  visit. Provided information regarding how to contact Spiritual Care and left a Spiritual Care information card.  - PoA: New PoAH Created: Visited patient in response to PoA for Healthcare consult/request. Created a new PoAH for Healthcare document that, per the patient, accurately reflects their wishes. Patient named daughter, Patti Baker ([phone]) as primary agent and Cruzito Prather ([phone]) as second. Gave the patient the original PoAH document along with copies. Confirmed that the PoAH primary agent name and contact information have been entered into the Epic, Advance Directives section. A copy of the new PoAH document has been placed on the patient's paper chart.  remains available for follow up.     CHICHI Orozco CAMII   [id number]     Spiritual Care support can be requested via an [facility] consult. For urgent/immediate needs, please contact the On Call  at: [place]: ext [phone]

## 2025-03-20 ENCOUNTER — APPOINTMENT (OUTPATIENT)
Dept: PICC SERVICES | Facility: HOSPITAL | Age: 75
End: 2025-03-20
Attending: PHYSICIAN ASSISTANT
Payer: MEDICARE

## 2025-03-20 LAB
ANION GAP SERPL CALC-SCNC: 8 MMOL/L (ref 0–18)
BASOPHILS # BLD AUTO: 0.07 X10(3) UL (ref 0–0.2)
BASOPHILS NFR BLD AUTO: 0.6 %
BUN BLD-MCNC: 22 MG/DL (ref 9–23)
BUN/CREAT SERPL: 20.2 (ref 10–20)
CALCIUM BLD-MCNC: 8.2 MG/DL (ref 8.7–10.4)
CHLORIDE SERPL-SCNC: 105 MMOL/L (ref 98–112)
CO2 SERPL-SCNC: 21 MMOL/L (ref 21–32)
CREAT BLD-MCNC: 1.09 MG/DL
DEPRECATED RDW RBC AUTO: 46.7 FL (ref 35.1–46.3)
EGFRCR SERPLBLD CKD-EPI 2021: 53 ML/MIN/1.73M2 (ref 60–?)
EOSINOPHIL # BLD AUTO: 0.15 X10(3) UL (ref 0–0.7)
EOSINOPHIL NFR BLD AUTO: 1.2 %
ERYTHROCYTE [DISTWIDTH] IN BLOOD BY AUTOMATED COUNT: 15.3 % (ref 11–15)
GLUCOSE BLD-MCNC: 204 MG/DL (ref 70–99)
GLUCOSE BLDC GLUCOMTR-MCNC: 148 MG/DL (ref 70–99)
GLUCOSE BLDC GLUCOMTR-MCNC: 178 MG/DL (ref 70–99)
GLUCOSE BLDC GLUCOMTR-MCNC: 228 MG/DL (ref 70–99)
GLUCOSE BLDC GLUCOMTR-MCNC: 243 MG/DL (ref 70–99)
GLUCOSE BLDC GLUCOMTR-MCNC: 252 MG/DL (ref 70–99)
HCT VFR BLD AUTO: 24.6 %
HGB BLD-MCNC: 8.2 G/DL
IMM GRANULOCYTES # BLD AUTO: 0.06 X10(3) UL (ref 0–1)
IMM GRANULOCYTES NFR BLD: 0.5 %
LYMPHOCYTES # BLD AUTO: 2.15 X10(3) UL (ref 1–4)
LYMPHOCYTES NFR BLD AUTO: 17.9 %
MCH RBC QN AUTO: 27.7 PG (ref 26–34)
MCHC RBC AUTO-ENTMCNC: 33.3 G/DL (ref 31–37)
MCV RBC AUTO: 83.1 FL
MONOCYTES # BLD AUTO: 0.99 X10(3) UL (ref 0.1–1)
MONOCYTES NFR BLD AUTO: 8.2 %
NEUTROPHILS # BLD AUTO: 8.61 X10 (3) UL (ref 1.5–7.7)
NEUTROPHILS # BLD AUTO: 8.61 X10(3) UL (ref 1.5–7.7)
NEUTROPHILS NFR BLD AUTO: 71.6 %
OSMOLALITY SERPL CALC.SUM OF ELEC: 287 MOSM/KG (ref 275–295)
PLATELET # BLD AUTO: 429 10(3)UL (ref 150–450)
POTASSIUM SERPL-SCNC: 4.4 MMOL/L (ref 3.5–5.1)
RBC # BLD AUTO: 2.96 X10(6)UL
SODIUM SERPL-SCNC: 134 MMOL/L (ref 136–145)
WBC # BLD AUTO: 12 X10(3) UL (ref 4–11)

## 2025-03-20 PROCEDURE — 99233 SBSQ HOSP IP/OBS HIGH 50: CPT | Performed by: HOSPITALIST

## 2025-03-20 PROCEDURE — B548ZZA ULTRASONOGRAPHY OF SUPERIOR VENA CAVA, GUIDANCE: ICD-10-PCS | Performed by: HOSPITALIST

## 2025-03-20 PROCEDURE — 3E04329 INTRODUCTION OF OTHER ANTI-INFECTIVE INTO CENTRAL VEIN, PERCUTANEOUS APPROACH: ICD-10-PCS | Performed by: HOSPITALIST

## 2025-03-20 PROCEDURE — 02HV33Z INSERTION OF INFUSION DEVICE INTO SUPERIOR VENA CAVA, PERCUTANEOUS APPROACH: ICD-10-PCS | Performed by: HOSPITALIST

## 2025-03-20 RX ORDER — CEFTRIAXONE SODIUM 2 G/50ML
2 INJECTION, SOLUTION INTRAVENOUS EVERY 24 HOURS
Qty: 2000 ML | Refills: 0 | Status: SHIPPED | OUTPATIENT
Start: 2025-03-20 | End: 2025-04-29

## 2025-03-20 RX ORDER — BISACODYL 10 MG
10 SUPPOSITORY, RECTAL RECTAL
Status: DISCONTINUED | OUTPATIENT
Start: 2025-03-20 | End: 2025-03-21

## 2025-03-20 RX ORDER — TRIAMCINOLONE ACETONIDE 5 MG/G
CREAM TOPICAL 2 TIMES DAILY
Status: DISCONTINUED | OUTPATIENT
Start: 2025-03-20 | End: 2025-03-21

## 2025-03-20 RX ORDER — HYDRALAZINE HYDROCHLORIDE 20 MG/ML
10 INJECTION INTRAMUSCULAR; INTRAVENOUS EVERY 4 HOURS PRN
Status: DISCONTINUED | OUTPATIENT
Start: 2025-03-20 | End: 2025-03-21

## 2025-03-20 RX ORDER — SODIUM PHOSPHATE, DIBASIC AND SODIUM PHOSPHATE, MONOBASIC 7; 19 G/230ML; G/230ML
1 ENEMA RECTAL ONCE AS NEEDED
Status: DISCONTINUED | OUTPATIENT
Start: 2025-03-20 | End: 2025-03-21

## 2025-03-20 RX ORDER — DOCUSATE SODIUM 100 MG/1
100 CAPSULE, LIQUID FILLED ORAL 2 TIMES DAILY
Status: DISCONTINUED | OUTPATIENT
Start: 2025-03-20 | End: 2025-03-21

## 2025-03-20 RX ORDER — VANCOMYCIN HYDROCHLORIDE
15 EVERY 24 HOURS
Status: DISCONTINUED | OUTPATIENT
Start: 2025-03-20 | End: 2025-03-21

## 2025-03-20 RX ORDER — POLYETHYLENE GLYCOL 3350 17 G/17G
17 POWDER, FOR SOLUTION ORAL DAILY PRN
Status: DISCONTINUED | OUTPATIENT
Start: 2025-03-20 | End: 2025-03-21

## 2025-03-20 RX ORDER — VANCOMYCIN HYDROCHLORIDE
1.25 EVERY 24 HOURS
Qty: 10000 ML | Refills: 0 | Status: SHIPPED | OUTPATIENT
Start: 2025-03-20 | End: 2025-04-29

## 2025-03-20 RX ORDER — LIDOCAINE HYDROCHLORIDE 10 MG/ML
5 INJECTION, SOLUTION EPIDURAL; INFILTRATION; INTRACAUDAL; PERINEURAL
Status: COMPLETED | OUTPATIENT
Start: 2025-03-20 | End: 2025-03-20

## 2025-03-20 NOTE — CM/SW NOTE
ANJELICA followed up on DC planning.     SW verified with the pt that she is agreeable to go to [facility] and be in a semi private room    Clinical updates sent in [facility] notified they are choice    Soonest discharge would be tomorrow pending 3 midnights     PLAN: DC to [facility] tomorrow pending medical clearance    Radha DÍAZ LSW, MSW ext. [phone]

## 2025-03-20 NOTE — PROGRESS NOTES
[facility]  part of [facility]    Progress Note    Nina Baker Patient Status:  Inpatient    1950 MRN [id number]   Location [facility] 4W/SW/SE Attending Chip Foreman MD   Hosp Day # 2 PCP JESSICA AGUIRRE MD         Subjective:   Subjective:  Patient awake, seated in bedside chair.  Reports overall she is doing.  Pain well-controlled, had increased pain at night, discontinued [place] to Percocet overnight.  While on new pain meds.  Denies numbness and tingling.    Objective:   Blood pressure 156/61, pulse 94, temperature 98 °F (36.7 °C), temperature source Oral, resp. rate 18, height 5' 6\" (1.676 m), weight 184 lb (83.5 kg), SpO2 97%.  Physical Exam  Lower extremity mildly, no pitting edema.  Calf soft nontender.  Able to plantarflex and dorsiflex against resistance.  Wound VAC on, clean and dry.  Drain still in 100 mL over the past 24 hours.  Able to bend 0 to 40 degrees with hinged knee brace.  Denies numbness and tingling.    Results:   Lab Results   Component Value Date    WBC 12.0 (H) 2025    HGB 8.2 (L) 2025    HCT 24.6 (L) 2025    .0 2025    CREATSERUM 1.09 (H) 2025    BUN 22 2025     (L) 2025    K 4.4 2025     2025    CO2 21.0 2025     (H) 2025    CA 8.2 (L) 2025    ALB 4.0 03/15/2025    ALKPHO 125 03/15/2025    BILT 0.2 03/15/2025    TP 7.8 03/15/2025    AST 16 03/15/2025    ALT 17 03/15/2025    INR 0.97 2023    LIP 19 2023    ESRML 50 (H) 2025    CRP 24.30 (H) 2025    MG 2.3 2021    TROP <0.045 06/10/2020    TROPHS 12 2023    CK 69 03/15/2025       XR KNEE (1 OR 2 VIEWS), RIGHT (CPT=73560)    Result Date: 3/18/2025  CONCLUSION:  1. Knee prosthesis explanted.  Antibiotic impregnated cement spacers in place.  Anatomic alignment.    Dictated by (CST): Robson Herring MD on 3/18/2025 at 6:43 PM     Finalized by (CST): Robson Herring MD on  3/18/2025 at 6:45 PM               Assessment & Plan:     Infection of total right knee replacement  Patient doing well postop day #2 excisional debridement, explant of components, and implant of antibiotic cement spacers following septic right knee replacement.  Patient remains touchdown weightbearing.  She has worked with physical therapy in order to get out of bed and into her bed.  PICC line is placed today.  Patient now wishes to go to subacute rehab.  She is picking her preferred placements today.  Hinged knee brace will stay on set to 0 to 40 degrees.  She will continue IV vancomycin.      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

## 2025-03-20 NOTE — PLAN OF CARE
Patient POD#1. A&Ox4. RA. Xarelto and SCD's for DVT prophylaxis. Kraoline diet, denies nausea. Accuchecks ACHS. Voiding with purewick. Added on prn percocet for pain control per MD. C/o itchiness prn benadryl given. Hemovac intact with 0 output. Prevena wound vac intact and patent no complications. IVF. IV abx given. TTWB. Call light within reach. Plan for PETE.    Problem: Patient Centered Care  Goal: Patient preferences are identified and integrated in the patient's plan of care  Description: Interventions:- What would you like us to know as we care for you? - Provide timely, complete, and accurate information to patient/family- Incorporate patient and family knowledge, values, beliefs, and cultural backgrounds into the planning and delivery of care- Encourage patient/family to participate in care and decision-making at the level they choose- Honor patient and family perspectives and choices  3/19/2025 2324 by Gerda Reynolds  Outcome: Progressing  3/19/2025 2322 by Gerda Reynolds  Outcome: Progressing     Problem: Diabetes/Glucose Control  Goal: Glucose maintained within prescribed range  Description: INTERVENTIONS:- Monitor Blood Glucose as ordered- Assess for signs and symptoms of hyperglycemia and hypoglycemia- Administer ordered medications to maintain glucose within target range- Assess barriers to adequate nutritional intake and initiate nutrition consult as needed- Instruct patient on self management of diabetes  3/19/2025 2324 by Gerda Reynolds  Outcome: Progressing  3/19/2025 2322 by Gerda Reynolds  Outcome: Progressing     Problem: Patient/Family Goals  Goal: Patient/Family Long Term Goal  Description: Patient's Long Term Goal: Interventions:- - See additional Care Plan goals for specific interventions  3/19/2025 2324 by Gerda Reynolds  Outcome: Progressing  3/19/2025 2322 by Gerda Reynolds  Outcome: Progressing  Goal: Patient/Family Short Term Goal  Description:  Patient's Short Term Goal: Interventions: - - See additional Care Plan goals for specific interventions  3/19/2025 2324 by Gerda Reynolds  Outcome: Progressing  3/19/2025 2322 by Gerda Reynolds  Outcome: Progressing     Problem: PAIN - ADULT  Goal: Verbalizes/displays adequate comfort level or patient's stated pain goal  Description: INTERVENTIONS:- Encourage pt to monitor pain and request assistance- Assess pain using appropriate pain scale- Administer analgesics based on type and severity of pain and evaluate response- Implement non-pharmacological measures as appropriate and evaluate response- Consider cultural and social influences on pain and pain management- Manage/alleviate anxiety- Utilize distraction and/or relaxation techniques- Monitor for opioid side effects- Notify MD/LIP if interventions unsuccessful or patient reports new pain- Anticipate increased pain with activity and pre-medicate as appropriate  3/19/2025 2324 by Gerda Reynolds  Outcome: Progressing  3/19/2025 2322 by Gerda Reynolds  Outcome: Progressing     Problem: RISK FOR INFECTION - ADULT  Goal: Absence of fever/infection during anticipated neutropenic period  Description: INTERVENTIONS- Monitor WBC- Administer growth factors as ordered- Implement neutropenic guidelines  3/19/2025 2324 by Gerda Reynolds  Outcome: Progressing  3/19/2025 2322 by Gerda Reynolds  Outcome: Progressing     Problem: SAFETY ADULT - FALL  Goal: Free from fall injury  Description: INTERVENTIONS:- Assess pt frequently for physical needs- Identify cognitive and physical deficits and behaviors that affect risk of falls.- [place] fall precautions as indicated by assessment.- Educate pt/family on patient safety including physical limitations- Instruct pt to call for assistance with activity based on assessment- Modify environment to reduce risk of injury- Provide assistive devices as appropriate- Consider OT/PT consult to assist  with strengthening/mobility- Encourage toileting schedule  3/19/2025 2324 by Gerda Reynolds  Outcome: Progressing  3/19/2025 2322 by Gerda Reynolds  Outcome: Progressing     Problem: DISCHARGE PLANNING  Goal: Discharge to home or other facility with appropriate resources  Description: INTERVENTIONS:- Identify barriers to discharge w/pt and caregiver- Include patient/family/discharge partner in discharge planning- Arrange for needed discharge resources and transportation as appropriate- Identify discharge learning needs (meds, wound care, etc)- Arrange for interpreters to assist at discharge as needed- Consider post-discharge preferences of patient/family/discharge partner- Complete POLST form as appropriate- Assess patient's ability to be responsible for managing their own health- Refer to Case Management Department for coordinating discharge planning if the patient needs post-hospital services based on physician/LIP order or complex needs related to functional status, cognitive ability or social support system  3/19/2025 2324 by Gerda Reynolds  Outcome: Progressing  3/19/2025 2322 by Gerda Reynolds  Outcome: Progressing

## 2025-03-20 NOTE — PROGRESS NOTES
[facility] Pharmacy Dosing Service      Initial Pharmacokinetic Consult for Vancomycin Dosing     Nina Baker is a 74 year old female who is being initiated on vancomycin therapy for osteomyelitis.  Pharmacy has been asked to dose vancomycin by Basia Beavers.  The initial treatment and monitoring approach will be steady state AUC strategy.        Weight and Temperature:    Wt Readings from Last 1 Encounters:   25 83.5 kg (184 lb)        Temp Readings from Last 1 Encounters:   25 98 °F (36.7 °C) (Oral)      Labs:   Recent Labs   Lab 03/15/25  1301 25  0632 25  0615   CREATSERUM 1.61* 1.30* 1.09*      Estimated Creatinine Clearance: 41.1 mL/min (A) (based on SCr of 1.09 mg/dL (H)).     Recent Labs   Lab 25  0615   WBC 12.0*          The Pharmacokinetic Target is:     to 600 mg-h/L and trough <=15 mg/L    Renal Dosing Considerations:    None     Assessment/Plan:   Initial/Loading dose: 3/18: 1 gm preop@1330, 1.25 gm post op@1429      Maintenance dose: Pharmacy will dose vancomycin at 1250 mg IV every 24 hours    Monitorin) Plan for vancomycin peak and trough to be obtained in approximately 48 hours    2) Pharmacy will order SCr as clinically indicated to assess renal function.    3) Pharmacy will monitor for toxicity and efficacy, adjust vancomycin dose and/or frequency, and order vancomycin levels as appropriate per the Pharmacy and Therapeutics Committee approved protocol until discontinuation of the medication.       We appreciate the opportunity to assist in the care of this patient.     Julianne Rodriguez, PharmNICKI  3/20/2025  10:13 AM  [facility] Pharmacy Extension: [phone]

## 2025-03-20 NOTE — CONSULTS
[facility]  part of [facility] ID CONSULT NOTE    Nina Baker Patient Status:  Inpatient    1950 MRN [id number]   Location [facility] [place] 4W/SW/SE Attending Chip Foreman MD   Hosp Day # 2 PCP JESSICA AGUIRRE MD       Reason for Consultation:  R knee PJI    ASSESSMENT:    Antibiotics: vancomycin, ceftriaxone  ancef    # R knee PJI s/p RIGO with spacer placement 3/18   -Aspiration 3/11 with 5904 wbcs, 93% segs, cx NGTD  # S/p R TKA 2023  # H/o L TKA 10/2013 c/b PJI s/p spacer placement 2014, cx negative per documentation     PLAN:  -  Continue on vancomycin and ceftriaxone. Place PICC in anticipation of six week course of abx (EOT 25) with FU in [facility] clinic.  -  Follow fever curve, wbc.  -  Reviewed labs, micro, imaging reports, available old records.  -  Case d/w patient, primary, RN.    History of Present Illness:  Nina Baker is a 74 year old female with a history of diabetes mellitus, s/p L TKA  c/b PJI in  and seen by [facility] colleague Dr. Stein, who had R TKA 2023 who started having drainage from her wound about a month ago. Denies any fevers or chills at home. Had outpatient aspiration with 5904 wbcs, 93% PMNs, cx negative and presented to [facility] on 3/18 for planned RIGO with spacer placement.  ID consulted.    History:  Past Medical History:    Arthritis    Chronic sinusitis    halitosis    Coronary atherosclerosis    Diabetes (HCC)    Esophageal reflux    Essential hypertension    High blood pressure    High cholesterol    Hyperlipidemia    Osteoarthritis    Visual impairment    glasses     Past Surgical History:   Procedure Laterality Date    Cath drug eluting stent      Hysterectomy      Knee replacement surgery Left     Total knee replacement Right 2023     Family History   Problem Relation Age of Onset    Cancer Father     Cancer Mother     Breast Cancer Mother       reports that she has been smoking cigarettes. She has never used  smokeless tobacco. She reports that she does not drink alcohol and does not use drugs.    Allergies:  Allergies[1]    Medications:    Current Facility-Administered Medications:     hydrALAzine (Apresoline) 20 mg/mL injection 10 mg, 10 mg, Intravenous, Q4H PRN    triamcinolone (Kenalog) 0.5 % cream, , Topical, BID    cefTRIAXone (Rocephin) 2 g in sodium chloride 0.9% 100 mL IVPB-ADDV, 2 g, Intravenous, Q24H    vancomycin (Vancocin) 1.25 g in sodium chloride 0.9% 250mL IVPB premix, 15 mg/kg, Intravenous, Q24H    HYDROmorphone (Dilaudid) 1 MG/ML injection 0.2 mg, 0.2 mg, Intravenous, Q2H PRN    oxyCODONE-acetaminophen (Percocet)  MG per tab 1 tablet, 1 tablet, Oral, Q6H PRN    calcium carbonate-vitamin D (Oyster Shell-D) 250-3.125 MG-MCG per tab 1 tablet, 1 tablet, Oral, BID with meals    diphenhydrAMINE (Benadryl) cap/tab 25 mg, 25 mg, Oral, Nightly PRN    diphenhydrAMINE (Benadryl) cap/tab 25 mg, 25 mg, Oral, Q6H PRN    docusate sodium (Colace) cap 100 mg, 100 mg, Oral, Daily PRN    cetirizine (ZyrTEC) tab 10 mg, 10 mg, Oral, Daily    glucose (Dex4) 15 GM/59ML oral liquid 15 g, 15 g, Oral, Q15 Min PRN **OR** glucose (Glutose) 40% oral gel 15 g, 15 g, Oral, Q15 Min PRN **OR** glucose-vitamin C (Dex-4) chewable tab 4 tablet, 4 tablet, Oral, Q15 Min PRN **OR** dextrose 50% injection 50 mL, 50 mL, Intravenous, Q15 Min PRN **OR** glucose (Dex4) 15 GM/59ML oral liquid 30 g, 30 g, Oral, Q15 Min PRN **OR** glucose (Glutose) 40% oral gel 30 g, 30 g, Oral, Q15 Min PRN **OR** glucose-vitamin C (Dex-4) chewable tab 8 tablet, 8 tablet, Oral, Q15 Min PRN    sennosides (Senokot) tab 17.2 mg, 17.2 mg, Oral, Nightly    ondansetron (Zofran) 4 MG/2ML injection 4 mg, 4 mg, Intravenous, Q6H PRN    metoclopramide (Reglan) 5 mg/mL injection 5 mg, 5 mg, Intravenous, Q8H PRN    diphenhydrAMINE (Benadryl) cap/tab 25 mg, 25 mg, Oral, Q4H PRN **OR** diphenhydrAMINE (Benadryl) 50 mg/mL  injection 12.5 mg, 12.5 mg, Intravenous, Q4H PRN     sodium chloride 0.9% infusion, , Intravenous, Continuous    rivaroxaban (Xarelto) tab 10 mg, 10 mg, Oral, Q24H    acetaminophen (Tylenol) tab 650 mg, 650 mg, Oral, Q8H PRN    ALPRAZolam (Xanax) tab 0.25 mg, 0.25 mg, Oral, QID PRN    atorvastatin (Lipitor) tab 20 mg, 20 mg, Oral, Nightly    linaGLIPtin (Tradjenta) tab 5 mg, 5 mg, Oral, Daily    losartan (Cozaar) tab 25 mg, 25 mg, Oral, Daily    pregabalin (Lyrica) cap 300 mg, 300 mg, Oral, Nightly    pantoprazole (Protonix) DR tab 40 mg, 40 mg, Oral, QAM AC    montelukast (Singulair) tab 10 mg, 10 mg, Oral, Nightly    insulin regular human (Novolin R, Humulin R) 100 UNIT/ML injection 1-7 Units, 1-7 Units, Subcutaneous, TID AC and HS    Review of Systems:  CONSTITUTIONAL:  No weight loss, weakness or fatigue.  HEENT:  Eyes:  No visual loss, blurred vision, double vision or yellow sclerae. Ears, Nose, Throat:  No hearing loss, sneezing, congestion, runny nose or sore throat.  SKIN:  No rash or itching.  CARDIOVASCULAR:  No chest pain, chest pressure or chest discomfort  RESPIRATORY:  No shortness of breath, cough or sputum.  GASTROINTESTINAL:  No anorexia, nausea, vomiting or diarrhea. No abdominal pain or blood.  GENITOURINARY:  No Burning on urination.   NEUROLOGICAL:  No headache, dizziness, syncope, paralysis, ataxia, numbness or tingling in the extremities.  MUSCULOSKELETAL:  +R knee pain  HEMATOLOGIC:  No anemia, bleeding or bruising.  ALLERGIES:  No history of asthma, hives, eczema or rhinitis.    Physical Exam:  Vital signs: Blood pressure 156/61, pulse 94, temperature 98 °F (36.7 °C), temperature source Oral, resp. rate 18, height 5' 6\" (1.676 m), weight 184 lb (83.5 kg), SpO2 97%.    General: Awake, in bed  HEENT: Moist mucous membranes.   Neck: No lymphadenopathy.  Supple.  Cardiovascular: RRR  Respiratory: Clear to auscultation bilaterally.  No wheezes. No rhonchi.  Abdomen: Soft, nontender, nondistended.   Musculoskeletal: No edema noted  Integument:  Healed L knee incision, R knee with vac and immobilizer intact    Laboratory Data:  Recent Labs   Lab 03/20/25  0615   RBC 2.96*   HGB 8.2*   HCT 24.6*   MCV 83.1   MCH 27.7   MCHC 33.3   RDW 15.3*   NEPRELIM 8.61*   WBC 12.0*   .0     Recent Labs   Lab 03/15/25  1301 03/19/25  0632 03/20/25  0615   * 158* 204*   BUN 34* 30* 22   CREATSERUM 1.61* 1.30* 1.09*   CA 9.0 8.6* 8.2*   ALB 4.0  --   --    * 134* 134*   K 4.5 4.8 4.4    108 105   CO2 22.0 21.0 21.0   ALKPHO 125  --   --    AST 16  --   --    ALT 17  --   --    BILT 0.2  --   --    TP 7.8  --   --        Microbiology: Reviewed in EMR    Radiology: Reviewed    Thank you for allowing us to participate in the care of this patient. Please do not hesitate to call if you have any questions.   We will continue to follow with you and will make further recommendations based on his progress.    [facility]  [phone]  3/20/2025         [1]   Allergies  Allergen Reactions    Insulin Aspart ITCHING

## 2025-03-20 NOTE — PLAN OF CARE
Nina is from home with her daughter, alert and oriented x 4 . Pod 2 left knee debridement, hardware removal, placement of spacer, locked hinge knee brace in place and on rle at all times-patent hemovac/prevena drsg in place, ac/hs glucose monitoring -see emar, jonathan diet, lbm 3/18-passing flatus(bowel regimen ordered), voiding via purewick and brp with RC, oob with 2 assists/RW/RC, fall precautions in place, xarelto/scd lle, percocet prn, kenalog/benadryl for c/o itching to b/l feet.  iv rocephin/vanco-ID on consult, PICC placed to FLEIPE-plan 6 weeks iv abx, dc tto [facility] pending auth tentatively tomorrow as patient will have sufficed 3 midnights  Problem: Patient Centered Care  Goal: Patient preferences are identified and integrated in the patient's plan of care  Description: Interventions:- What would you like us to know as we care for you? Has one daughter- Provide timely, complete, and accurate information to patient/family- Incorporate patient and family knowledge, values, beliefs, and cultural backgrounds into the planning and delivery of care- Encourage patient/family to participate in care and decision-making at the level they choose- Honor patient and family perspectives and choices  Outcome: Progressing     Problem: Diabetes/Glucose Control  Goal: Glucose maintained within prescribed range  Description: INTERVENTIONS:- Monitor Blood Glucose as ordered- Assess for signs and symptoms of hyperglycemia and hypoglycemia- Administer ordered medications to maintain glucose within target range- Assess barriers to adequate nutritional intake and initiate nutrition consult as needed- Instruct patient on self management of diabetes  Outcome: Progressing     Problem: Patient/Family Goals  Goal: Patient/Family Long Term Goal  Description: Patient's Long Term Goal: return to baseline adls Interventions:-   pt/ot  Knee immobilizer  Pain control  Use of rec equip  - See additional Care Plan goals for specific  interventions  Outcome: Progressing  Goal: Patient/Family Short Term Goal  Description: Patient's Short Term Goal: rehab tomorrow  Interventions: -   Sw to help coordinate/facilitate  Vs/labs wnl  Pain well controlled  - See additional Care Plan goals for specific interventions  Outcome: Progressing     Problem: PAIN - ADULT  Goal: Verbalizes/displays adequate comfort level or patient's stated pain goal  Description: INTERVENTIONS:- Encourage pt to monitor pain and request assistance- Assess pain using appropriate pain scale- Administer analgesics based on type and severity of pain and evaluate response- Implement non-pharmacological measures as appropriate and evaluate response- Consider cultural and social influences on pain and pain management- Manage/alleviate anxiety- Utilize distraction and/or relaxation techniques- Monitor for opioid side effects- Notify MD/LIP if interventions unsuccessful or patient reports new pain- Anticipate increased pain with activity and pre-medicate as appropriate  Outcome: Progressing     Problem: RISK FOR INFECTION - ADULT  Goal: Absence of fever/infection during anticipated neutropenic period  Description: INTERVENTIONS- Monitor WBC- Administer growth factors as ordered- Implement neutropenic guidelines  Outcome: Progressing     Problem: SAFETY ADULT - FALL  Goal: Free from fall injury  Description: INTERVENTIONS:- Assess pt frequently for physical needs- Identify cognitive and physical deficits and behaviors that affect risk of falls.- [place] fall precautions as indicated by assessment.- Educate pt/family on patient safety including physical limitations- Instruct pt to call for assistance with activity based on assessment- Modify environment to reduce risk of injury- Provide assistive devices as appropriate- Consider OT/PT consult to assist with strengthening/mobility- Encourage toileting schedule  Outcome: Progressing     Problem: DISCHARGE PLANNING  Goal: Discharge to home  or other facility with appropriate resources  Description: INTERVENTIONS:- Identify barriers to discharge w/pt and caregiver- Include patient/family/discharge partner in discharge planning- Arrange for needed discharge resources and transportation as appropriate- Identify discharge learning needs (meds, wound care, etc)- Arrange for interpreters to assist at discharge as needed- Consider post-discharge preferences of patient/family/discharge partner- Complete POLST form as appropriate- Assess patient's ability to be responsible for managing their own health- Refer to Case Management Department for coordinating discharge planning if the patient needs post-hospital services based on physician/LIP order or complex needs related to functional status, cognitive ability or social support system  Outcome: Progressing

## 2025-03-20 NOTE — PHYSICAL THERAPY NOTE
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      Patient is requiring moderate assist as a result of the following impairments: decreased functional strength, pain, impaired dynamic standing balance, decreased muscular endurance, medical status, and limited right knee ROM, and weight bearing restrictions.     Patient continues to function below baseline with bed mobility, transfers, gait, stair negotiation, standing prolonged periods, and performing household tasks.  Next session anticipate patient to progress bed mobility, transfers, and gait.  Physical Therapy will continue to follow patient for duration of hospitalization.    Patient continues to benefit from continued skilled PT services: to promote return to prior level of function and safety with continuous assistance and gradual rehabilitative therapy .    PLAN DURING HOSPITALIZATION  Nursing Mobility Recommendation :  (2 assist)  PT Device Recommendation: Rolling walker, Gait belt  PT Treatment Plan: Body mechanics, Bed mobility, Endurance, Energy conservation, Patient education, Gait training, Strengthening, Stair training, Transfer training, Balance training  Frequency (Obs): 3-5x/week     SUBJECTIVE  Agreeable to activity.     OBJECTIVE  Precautions:  (wound vac,  hinged knee brace locked at 0-40)    WEIGHT BEARING RESTRICTION  R Lower Extremity: Toe Touch Weight Bearing      PAIN ASSESSMENT   Rating:  (did not rate)  Location: RLE  Management Techniques: Activity promotion, Body mechanics, Repositioning    BALANCE  Static Sitting: Good  Dynamic Sitting: Fair +  Static Standing: Poor +  Dynamic Standing: Poor    AM-PAC '6-Clicks' INPATIENT SHORT FORM - BASIC MOBILITY  How much difficulty does the patient currently have...  Patient Difficulty: Turning over in bed (including adjusting bedclothes, sheets and blankets)?: A Little   Patient Difficulty: Sitting down on and standing up from a chair with arms (e.g., wheelchair, bedside commode, etc.): A Lot   Patient Difficulty: Moving from lying on back to sitting on the side of the bed?: A Little   How much help from another person does the patient currently need...   Help from Another: Moving to and from a bed to a chair (including a wheelchair)?: A Lot   Help from Another: Need to walk in hospital room?: A Lot   Help from Another: Climbing 3-5 steps with a railing?: A Lot     AM-PAC Score:  Raw Score: 14   Approx Degree of Impairment: 61.29%   Standardized Score (AM-PAC Scale): 38.1   CMS Modifier (G-Code): CL    FUNCTIONAL ABILITY STATUS  Functional Mobility/Gait Assessment  Gait Assistance: Moderate assistance  Distance (ft): 5  Assistive Device: Rolling walker  Pattern: Shuffle (slow, unsteady,unable to maintain TTWB on RLE)  Supine to Sit: minimal assist  Sit to Stand: moderate assist    Skilled Therapy Provided: Pt received resting in bed and agreeable to activity. Education reinforced regarding use of knee brace locked at 0-40 and TTWB on RLE. Demonstration provided on use of RW and gait with TTWB. Pt improved with bed mobility and transfers this date, requiring min A for supine>sit, and mod A x 1 for STS and steps to chair with RW. Pt continued to be unable to maintain TTWB on RLE, despite repeated verbal cues for  sequencing and RW management, very unsteady gait with 1 small posterior LOB requiring mod A to correct. Pt was left sitting in chair with BLE elevated, needs within reach, handoff to RN complete.    The patient's Approx Degree of Impairment: 61.29% has been calculated based on documentation in the [facility] '6 clicks' Inpatient Daily Activity Short Form.  Research supports that patients with this level of impairment may benefit from rehab facility.  Final disposition will be made by interdisciplinary medical team.    Patient End of Session: Up in chair, Needs met, Call light within reach, RN aware of session/findings, All patient questions and concerns addressed, Hospital anti-slip socks    CURRENT GOALS   Goals to be met by: 3/29/25  Patient Goal Patient's self-stated goal is: go home   Goal #1 Patient is able to demonstrate supine - sit EOB @ level: supervision      Goal #1   Current Status  not met/in progress   Goal #2 Patient is able to demonstrate transfers Sit to/from Stand at assistance level: supervision with walker - rolling      Goal #2  Current Status  not met/in progress   Goal #3 Patient is able to ambulate 50 feet with assist device: walker - rolling at assistance level: supervision   Goal #3   Current Status  not met/in progress   Goal #4 Stair goal TBD   Goal #4   Current Status     Goal #5 Patient to demonstrate independence with home activity/exercise instructions provided to patient in preparation for discharge.   Goal #5   Current Status  not met/in progress   Goal #6     Goal #6  Current Status          Therapeutic Activity: 15 minutes

## 2025-03-20 NOTE — SPIRITUAL CARE NOTE
Spiritual Care Visit Note    Patient Name: Nina Baker Date of Spiritual Care Visit: 25   : 1950 Primary Dx: Infection of total right knee replacement       Referred By: Referral From: , Patient, Nurse    Spiritual Care Taxonomy:    Intended Effects: Helping someone feel comforted    Methods: Collaborate with care team member, Offer emotional support, Explore values conflict    Interventions: Active listening, Ask guided questions, Discuss concerns, Discuss frustrations with someone, Share words of hope and inspiration    Visit Type/Summary:     - Spiritual Care: Responded to a request via the on call phone Consulted with RN prior to visit. Offered empathic listening and emotional support. Patient and family expressed appreciation for  visit. Patient spoke about family dynamics and her love for singing in the [employer].   remains available as needed for follow up.    CHICHI Avelar CAMII   [id number]     Spiritual Care support can be requested via an Epic consult. For urgent/immediate needs, please contact the On Call  at: [place]: ext [phone]

## 2025-03-20 NOTE — CONGREGATE LIVING REVIEW
[facility] Living Authorization    The [facility] Review Committee has reviewed this case and the patient IS APPROVED for discharge to a facility for Short Term Skilled once the following procedure is followed:     - The physician discharge instructions (contained within the MEDHAT note for SNF) must inlcude the below appropriate and approved COVID instructions to the facility    For questions regarding CLRC approval process, please contact the CM assigned to the case.  For questions regarding RN discharge workflow, please contact the unit Clinical Leader.

## 2025-03-20 NOTE — PROGRESS NOTES
[facility]  part of [facility]  Hospitalist Progress Note     Nina Baker Patient Status:  Inpatient    1950  74 year old CSN [id number]   Location 427/427-A Attending Chip Foreman MD   Hosp Day # 2 PCP JESSICA AGUIRRE MD     Assessment & Plan:   ----------------------------------  Prosthetic joint infection, right knee.  Status post explantation, antibiotic spacer 3/18.  Overall doing well.  -PT/OT  -IV vancomycin  -ID consult  -Orthopedics following  -Subacute rehab versus home  -Monitor cultures    Other problems  Type 2 diabetes  Coronary artery disease  Hypertension  Dyslipidemia  CKD 3    Supplementary Documentation:   DVT Mechanical Prophylaxis: KENNY hose, SCDs, Early ambuation  DVT Pharmacologic Prophylaxis   Medication    rivaroxaban (Xarelto) tab 10 mg                Code Status: Full Code  Feldman: External urinary catheter in place  [place] Duration (in days):   Central line:    DARLIN: 3/21/2025        **Certification      PHYSICIAN Certification of Need for Inpatient Hospitalization - Initial Certification    Patient will require inpatient services that will reasonably be expected to span two midnight's based on the clinical documentation in H+P.   Based on patients current state of illness, I anticipate that, after discharge, patient will require TBD.           I personally reviewed the available laboratories, imaging including. I discussed/will discuss the case with consultants. I ordered laboratories and/or radiographic studies. I adjusted medications as detailed above.  Medical decision making high, risk is high.    Subjective:   ----------------------------------  Does not like immobilizer. Has some itching. Some social issues that are weighing on her mind.      Objective:   Chief Complaint: No chief complaint on file.    ----------------------------------  Temp:  [97.8 °F (36.6 °C)-98.8 °F (37.1 °C)] 97.8 °F (36.6 °C)  Pulse:  [87-91] 88  Resp:  [16-18] 18  BP:  (150-182)/(63-68) 150/63  SpO2:  [100 %] 100 %  Gen: A+Ox3.  No distress.   HEENT: NCAT, neck supple, no carotid bruit.  CV: RRR, S1S2, and intact distal pulses. No gallop, rub, murmur.  Pulm: Effort and breath sounds normal. No distress, wheezes, rales, rhonchi.  Abd: Soft, NTND, BS normal, no mass, no HSM, no rebound/guarding.   Neuro: Normal reflexes, CN. Sensory/motor exams grossly normal deficit.   MS: No joint effusions.  Right leg in immobilizer, Ace bandage.  Wound VAC in place. No rash seen in areas of pruritus, feet  Skin: Skin is warm and dry. No rashes, erythema, diaphoresis.   Psych: Normal mood and affect. Calm, cooperative    Labs:  Lab Results   Component Value Date    HGB 8.2 (L) 03/20/2025    WBC 12.0 (H) 03/20/2025    .0 03/20/2025     (L) 03/20/2025    K 4.4 03/20/2025    CREATSERUM 1.09 (H) 03/20/2025    INR 0.97 01/03/2023    AST 16 03/15/2025    ALT 17 03/15/2025    TROP <0.045 06/10/2020            triamcinolone   Topical BID    calcium carbonate-vitamin D  1 tablet Oral BID with meals    cetirizine  10 mg Oral Daily    sennosides  17.2 mg Oral Nightly    rivaroxaban  10 mg Oral Q24H    ceFAZolin  2 g Intravenous Q8H    atorvastatin  20 mg Oral Nightly    linaGLIPtin  5 mg Oral Daily    losartan  25 mg Oral Daily    pregabalin  300 mg Oral Nightly    pantoprazole  40 mg Oral QAM AC    montelukast  10 mg Oral Nightly    insulin regular human  1-7 Units Subcutaneous TID AC and HS       hydrALAzine    HYDROmorphone    oxyCODONE-acetaminophen    diphenhydrAMINE    diphenhydrAMINE    docusate sodium    glucose **OR** glucose **OR** glucose-vitamin C **OR** dextrose **OR** glucose **OR** glucose **OR** glucose-vitamin C    sodium chloride    ondansetron    metoclopramide    diphenhydrAMINE **OR** diphenhydrAMINE    acetaminophen    ALPRAZolam

## 2025-03-21 VITALS
RESPIRATION RATE: 18 BRPM | OXYGEN SATURATION: 98 % | TEMPERATURE: 98 F | HEART RATE: 86 BPM | HEIGHT: 66 IN | DIASTOLIC BLOOD PRESSURE: 56 MMHG | BODY MASS INDEX: 29.57 KG/M2 | SYSTOLIC BLOOD PRESSURE: 140 MMHG | WEIGHT: 184 LBS

## 2025-03-21 LAB
GLUCOSE BLDC GLUCOMTR-MCNC: 162 MG/DL (ref 70–99)
GLUCOSE BLDC GLUCOMTR-MCNC: 276 MG/DL (ref 70–99)
SARS-COV-2 RNA RESP QL NAA+PROBE: NOT DETECTED

## 2025-03-21 PROCEDURE — 99239 HOSP IP/OBS DSCHRG MGMT >30: CPT | Performed by: HOSPITALIST

## 2025-03-21 RX ORDER — HYDROCODONE BITARTRATE AND ACETAMINOPHEN 5; 325 MG/1; MG/1
1 TABLET ORAL EVERY 6 HOURS PRN
Qty: 20 TABLET | Refills: 0 | Status: SHIPPED | OUTPATIENT
Start: 2025-03-21

## 2025-03-21 RX ORDER — INSULIN DEGLUDEC 100 U/ML
10 INJECTION, SOLUTION SUBCUTANEOUS DAILY
Status: DISCONTINUED | OUTPATIENT
Start: 2025-03-21 | End: 2025-03-21

## 2025-03-21 RX ORDER — INSULIN DEGLUDEC 100 U/ML
10 INJECTION, SOLUTION SUBCUTANEOUS DAILY
Status: SHIPPED | COMMUNITY
Start: 2025-03-22

## 2025-03-21 RX ORDER — POLYETHYLENE GLYCOL 3350 17 G/17G
17 POWDER, FOR SOLUTION ORAL DAILY PRN
Status: SHIPPED | COMMUNITY
Start: 2025-03-21

## 2025-03-21 RX ORDER — PSEUDOEPHEDRINE HCL 30 MG
100 TABLET ORAL 2 TIMES DAILY
Status: SHIPPED | COMMUNITY
Start: 2025-03-21

## 2025-03-21 RX ORDER — ACETAMINOPHEN 325 MG/1
650 TABLET ORAL EVERY 8 HOURS PRN
Status: SHIPPED | COMMUNITY
Start: 2025-03-21

## 2025-03-21 NOTE — DISCHARGE SUMMARY
[facility]  part of [facility]     DISCHARGE SUMMARY     Nina Baker Patient Status:  Inpatient    1950 MRN [id number]   Location [facility] 4W/SW/SE Attending No att. providers found   Hosp Day # 3 PCP JESSICA AGUIRRE MD     DATE OF ADMISSION: 3/18/2025  DATE OF DISCHARGE: 3/21/2025   DISPOSITION: subacute rehab  CONDITION ON DISCHARGE: good    DISCHARGE DIAGNOSES:  Prosthetic joint infection, right knee  Type 2 diabetes  Coronary artery disease  Hypertension  Dyslipidemia  CKD 3    Procedure(s):  Excisional debridement, explanted components, infected right knee replacement, and implantation of antibiotic cement spacers, wound vac placement     HISTORY OF PRESENT ILLNESS (COPIED FROM ADMISSION H&P)  Patient is a 74-year-old  female with history of right total knee arthroplasty in 2023. Two to 3 weeks ago, started developing itching and blisters around the surgical wound and started developing drainage. She was evaluated by her orthopedic surgeon, Dr. Rivera, who scheduled her today for above-mentioned procedure for irrigation and debridement initially. Intraoperatively, she was found to have prosthetic joint infection, hardware was explanted, and had an antibiotic spacer placed, deep cultures were obtained, and transferred to PACU for further monitoring.     HOSPITAL COURSE:  Patient was admitted for elective surgery as described above.  Postoperatively she did well but deemed to be appropriate for subacute rehab.  Seen by infectious disease, will finish prolonged course of IV antibiotics.  She understands her activity restrictions.    Patient understands return to the emergency room for increased pain, fever, discharge, shortness of breath, chest pain, new neurologic symptoms, other concerning symptoms.    PHYSICAL EXAM:  Temp:  [97.8 °F (36.6 °C)-99.2 °F (37.3 °C)] 97.8 °F (36.6 °C)  Pulse:  [] 86  Resp:  [18-20] 18  BP: ()/(56-80)  140/56  SpO2:  [98 %-99 %] 98 %  Gen: A+Ox3.  No distress.   HEENT: NCAT, neck supple, no carotid bruit.  CV: RRR, S1S2, and intact distal pulses. No gallop, rub, murmur.  Pulm: Effort and breath sounds normal. No distress, wheezes, rales, rhonchi.  Abd: Soft, NTND, BS normal, no mass, no HSM, no rebound/guarding.   Neuro: Normal reflexes, CN. Sensory/motor exams grossly normal deficit. Coordination  and gait normal.   MS: No joint effusions.  No peripheral edema.  Right knee incision clean dry and intact, mini wound VAC in place  Skin: Skin is warm and dry. No rashes, erythema, diaphoresis.   Psych: Normal mood and affect. Behavior and judgment normal.     DISCHARGE MEDICATIONS     Discharge Medications        START taking these medications        Instructions Prescription details   acetaminophen 325 MG Tabs  Commonly known as: Tylenol  Replaces: Acetaminophen 500 MG Caps      Take 2 tablets (650 mg total) by mouth every 8 (eight) hours as needed.   Refills: 0     cefTRIAXone in dextrose 5% IVPB premix  Commonly known as: Rocephin      Inject 50 mL (2 g total) into the vein daily.   Stop taking on: April 29, 2025  Quantity: 2000 mL  Refills: 0     HYDROcodone-acetaminophen 5-325 MG Tabs  Commonly known as: Norco      Take 1 tablet by mouth every 6 (six) hours as needed for Pain.   Quantity: 20 tablet  Refills: 0     insulin degludec 100 units/mL Sopn  Commonly known as: Tresiba  Start taking on: March 22, 2025      Inject 10 Units into the skin daily.   Refills: 0     Naloxone HCl 4 MG/0.1ML Liqd      4 mg by Intranasal route as needed (May repeat as needed in other nostril if symptoms persist). If patient remains unresponsive, repeat dose in other nostril 2-5 minutes after first dose.   Quantity: 1 kit  Refills: 0     Polyethylene Glycol 3350 17 g Pack  Commonly known as: MIRALAX      Take 17 g by mouth daily as needed.   Refills: 0     rivaroxaban 10 MG Tabs  Commonly known as: Xarelto      Take 1 tablet (10 mg  total) by mouth daily. Give with food.   Quantity: 30 tablet  Refills: 0     vancomycin in sodium chloride 0.9% 1.25 g/250mL Soln  Commonly known as: Vancocin      Inject 250 mL (1.25 g total) into the vein daily.   Stop taking on: April 29, 2025  Quantity: 62065 mL  Refills: 0            CHANGE how you take these medications        Instructions Prescription details   docusate sodium 100 MG Caps  Commonly known as: COLACE  What changed:   how much to take  when to take this  reasons to take this      Take 100 mg by mouth 2 (two) times daily.   Refills: 0            CONTINUE taking these medications        Instructions Prescription details   atorvastatin 20 MG Tabs  Commonly known as: Lipitor      Take 1 tablet (20 mg total) by mouth nightly.   Quantity: 30 tablet  Refills: 5     calcium carbonate-vitamin D 250-3.125 MG-MCG Tabs  Commonly known as: Oyster Shell-D      Take 1 tablet by mouth 2 (two) times daily with meals.   Quantity: 60 tablet  Refills: 0     glipiZIDE 5 MG Tabs  Commonly known as: Glucotrol      Take 2 tablets (10 mg total) by mouth 2 (two) times daily before meals.   Refills: 2     Januvia 100 MG Tabs  Generic drug: SITagliptin Phosphate  Notes to patient: Continue home schedule      Take 1 tablet (100 mg total) by mouth daily.   Refills: 0     loratadine 10 MG Tabs  Commonly known as: Claritin  Notes to patient: Continue home schedule      TAKE 1 TABLET BY MOUTH EVERY DAY   Quantity: 90 tablet  Refills: 0     losartan 25 MG Tabs  Commonly known as: Cozaar      Take 1 tablet (25 mg total) by mouth daily.   Refills: 0     Lyrica 300 MG Caps  Generic drug: pregabalin      Take 1 capsule (300 mg total) by mouth nightly.   Refills: 3     montelukast 10 MG Tabs  Commonly known as: Singulair      Take 1 tablet (10 mg total) by mouth nightly.   Quantity: 30 tablet  Refills: 3     pantoprazole 40 MG Tbec  Commonly known as: Protonix      Take 1 tablet (40 mg total) by mouth every morning before  breakfast.   Refills: 0            STOP taking these medications      Acetaminophen 500 MG Caps  Replaced by: acetaminophen 325 MG Tabs        ALPRAZolam 0.25 MG Tabs  Commonly known as: Xanax        aspirin 81 MG Tbec        diphenhydrAMINE 25 MG Caps  Commonly known as: Banophen        doxycycline 100 MG Caps  Commonly known as: Vibramycin        ibuprofen 600 MG Tabs  Commonly known as: Motrin        sulfamethoxazole-trimethoprim -160 MG Tabs per tablet  Commonly known as: Bactrim DS        ticagrelor 90 MG Tabs  Commonly known as: Brilinta        traMADol 50 MG Tabs  Commonly known as: Ultram                  Where to Get Your Medications        These medications were sent to [id number] - [place], [place] - [address] [phone], [phone] [address], [facility] [address]      Phone: [phone]   Naloxone HCl 4 MG/0.1ML Liqd  rivaroxaban 10 MG Tabs       Please  your prescriptions at the location directed by your doctor or nurse    Bring a paper prescription for each of these medications  cefTRIAXone in dextrose 5% IVPB premix  HYDROcodone-acetaminophen 5-325 MG Tabs  vancomycin in sodium chloride 0.9% 1.25 g/250mL Soln         CONSULTANTS  Consultants         Provider   Role Specialty     Jarod Taylor MD      Consulting Physician INFECTIOUS DISEASES     Susu Esposito MD      Consulting Physician HOSPITALIST            FOLLOW UP:  Rosalino Zhang MD  [address] [facility] [address]  [facility] [id number] [phone]    Schedule an appointment as soon as possible for a visit      Juancho Freire MD  [address]  [place] [id number] [phone]    Schedule an appointment as soon as possible for a visit in 2 week(s)      Houston Rivera MD  [address]  [address]  [facility] [id number] [phone]    Schedule an appointment as soon as possible for a visit in 2 week(s)  Surgery follow  up    The above plan and follow-up instructions were reviewed with the patient and they verbalized understanding and agreement.  They understand to return to the emergency room for any concerning signs or symptoms.  Greater than 30 minutes spent on discharge.  -----------------------    Hospital Discharge Diagnoses:  Prosthetic joint infection    Lace+ Score: 38  59-90 High Risk  29-58 Medium Risk  0-28   Low Risk.    TCM Follow-Up Recommendation:  LACE 29-58: Moderate Risk of readmission after discharge from the hospital.  Supplementary Documentation:

## 2025-03-21 NOTE — PHYSICAL THERAPY NOTE
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      Patient is requiring moderate assist as a result of the following impairments: decreased functional strength, decreased endurance/aerobic capacity, pain, impaired static and dunamic balance, and decreased muscular endurance.     Patient continues to function below baseline with bed mobility, transfers, gait, maintaining seated position, and standing prolonged periods.  Next session anticipate patient to progress bed mobility, transfers, gait, maintaining seated position, and standing prolonged periods.  Physical Therapy will continue to follow patient for duration of hospitalization.    Patient continues to benefit from continued skilled PT services: to promote return to prior level of function and safety with continuous assistance and gradual rehabilitative therapy .    PLAN DURING HOSPITALIZATION  Nursing Mobility Recommendation :  (2 assist)  PT Device Recommendation: Rolling walker, Gait belt  PT Treatment Plan: Body mechanics, Bed mobility, Endurance, Energy conservation, Patient education, Gait training, Strengthening, Stair training, Transfer training, Balance training  Frequency (Obs): 3-5x/week     SUBJECTIVE  Pt was agreeable to therapy session.          OBJECTIVE  Precautions:  (wound vac, hinged knee brace locked at 0-40)    WEIGHT BEARING STATUS  R Lower Extremity: Toe Touch Weight Bearing      PAIN ASSESSMENT   Rating: Unable to rate  Location: R LE  Management Techniques: Activity promotion, Body mechanics, Relaxation, Repositioning    BALANCE  Static Sitting: Fair +  Dynamic Sitting: Fair  Static Standing: Poor -  Dynamic Standing: Poor -    ACTIVITY TOLERANCE                         O2 WALK       AM-PAC '6-Clicks' INPATIENT SHORT FORM - BASIC MOBILITY  How much difficulty does the patient currently have...  Patient Difficulty: Turning over in bed (including adjusting bedclothes, sheets and blankets)?: A Little   Patient Difficulty: Sitting down on and standing up from a chair with arms (e.g., wheelchair, bedside commode, etc.): A Lot   Patient Difficulty: Moving from lying on back to sitting on the side of the bed?: A Little   How much help from another person does the patient currently need...   Help from Another: Moving to and from a bed to a chair (including a wheelchair)?: A Lot   Help from Another: Need to walk in hospital room?: Total   Help from Another: Climbing 3-5 steps with a railing?: Total     AM-PAC Score:  Raw Score: 12   Approx Degree of Impairment: 68.66%   Standardized Score (AM-PAC Scale): 35.33   CMS Modifier (G-Code): CL    FUNCTIONAL ABILITY STATUS  Functional Mobility/Gait Assessment  Gait Assistance: Moderate assistance  Distance (ft): SPT  Assistive Device: Rolling walker, Other (Comment) (holding onto therapist)  Pattern: R Decreased stance time (R LE TTWB)  Rolling: minimal assist  Supine to Sit: minimal assist  Sit to Supine:  NT  Sit to Stand: moderate assist    Skilled Therapy Provided: Pt is received in the bed and was cleared for therapy session. Donned pt's KI before getting out of the bed. Pt is min A with bed mobility and to transfer to the EOB. Pt required assist with her L LE to transfer to the EOB. Pt required  slow movements due to her increased pain level. Pt sat EOB for a few minutes and denied any dizziness and light headedness. Pt is mod A with sit<>stand transfers while holding onto the therapist and performed SPT from the bed to the chair also mod A. Pt with difficulty maintaining her TTWB status on her R LE during the transfer. Pt then sat in the chair for a few minutes to rest. Pt then performed standing balance with the RW and stood about 30-45 sec with min A and was able to performed NWB on her R LE with cueing. Returned pt back to sitting in the chair with all needs within reach. Discussed with the pt on dc planning to rehab. Reported to the RN on the status of the pt.     The patient's Approx Degree of Impairment: 68.66% has been calculated based on documentation in the [facility] '6 clicks' Inpatient Basic Mobility Short Form.  Research supports that patients with this level of impairment may benefit from Rehab.  Final disposition will be made by interdisciplinary medical team.        Knee ROM                 Patient End of Session: Up in chair, Needs met, Call light within reach, RN aware of session/findings, All patient questions and concerns addressed, Hospital anti-slip socks, Alarm set    CURRENT GOALS  Goals to be met by: 3/29/25  Patient Goal Patient's self-stated goal is: go home   Goal #1 Patient is able to demonstrate supine - sit EOB @ level: supervision      Goal #1   Current Status Min A    Goal #2 Patient is able to demonstrate transfers Sit to/from Stand at assistance level: supervision with walker - rolling      Goal #2  Current Status  mod A with the RW and while holding therapist   Goal #3 Patient is able to ambulate 50 feet with assist device: walker - rolling at assistance level: supervision   Goal #3   Current Status  SPT holding therapist mod A    Goal #4 Stair goal TBD   Goal #4   Current Status  N/A   Goal #5 Patient to demonstrate independence with home activity/exercise instructions  provided to patient in preparation for discharge.   Goal #5   Current Status  IN PROGRESS   Goal #6     Goal #6  Current Status       Therapeutic Activity: 25 minutes

## 2025-03-21 NOTE — PROGRESS NOTES
INFECTIOUS DISEASE PROGRESS NOTE  [facility]  part of Located within [facility] ID PROGRESS NOTE    Nina Baker Patient Status:  Inpatient    1950 MRN [id number]   Location [facility] 4W/SW/SE Attending Chip Foreman MD   Hosp Day # 3 PCP JESSICA AGUIRRE MD     Subjective:  ROS reviewed. Pain controlled. Plans for rehab. PICC placed yesterday.    ASSESSMENT:    Antibiotics: Vancomycin, ceftriaxone  ancef     # R knee PJI s/p RIGO with spacer placement 3/18               -Aspiration 3/11 with 5904 wbcs, 93% segs, cx NGTD  # S/p R TKA 2023  # H/o L TKA 10/2013 c/b PJI s/p spacer placement 2014, cx negative per documentation      PLAN:  -  Continue on vancomycin and ceftriaxone for six week course (EOT 25) with FU in [facility] clinic.  -  Follow fever curve, wbc.  -  Reviewed labs, micro, imaging reports, available old records.  -  Case d/w patient, RN.     History of Present Illness:  Nina Baker is a 74 year old female with a history of diabetes mellitus, s/p L TKA  c/b PJI in  and seen by [facility] colleague Dr. Stein, who had R TKA 2023 who started having drainage from her wound about a month ago. Denies any fevers or chills at home. Had outpatient aspiration with 5904 wbcs, 93% PMNs, cx negative and presented to [facility] on 3/18 for planned RIGO with spacer placement.  ID consulted.    Physical Exam:  /56 (BP Location: Left arm)   Pulse 86   Temp 97.8 °F (36.6 °C) (Oral)   Resp 18   Ht 5' 6\" (1.676 m)   Wt 184 lb (83.5 kg)   SpO2 98%   BMI 29.70 kg/m²     Gen:   NAD, awake, alert  HEENT:  PERRLA/EOMI, neck supple  CV/lungs:  RRR, CTAB  Abdom:  Soft, NT/ND, +BS  Skin/extrem:  No rashes, R knee with wound vac intact  Lines:  PICC+    Laboratory Data: Reviewed    Microbiology: Reviewed    Radiology: Reviewed      [facility]  [phone]  3/21/2025

## 2025-03-21 NOTE — CM/SW NOTE
03/21/25 0956   Discharge disposition   Expected discharge disposition subacute   Post Acute Care Provider PP SNF   Discharge transportation [facility]     Destination: [place] [address]  Call for report to: [phone]  Room # 204-1  Transportation provider-[facility] Time: 1 PM    MDO placed for discharge.  CM confirmed with [facility] liaison Paula - bed is available and facility can accept patient at 1 PM today. PCS form completed. RAQUEL Andres and RAQUEL Nugent notified of above.    Patient cleared for discharge from CM/SW standpoint.    Naz Morley RN, BSN  Nurse   [phone]

## 2025-03-21 NOTE — PROGRESS NOTES
[facility]  part of [facility]    Progress Note    Nina Baker Patient Status:  Inpatient    1950 MRN [id number]   Location [facility] 4W/SW/SE Attending Chip Foreman MD   Hosp Day # 2 PCP JESSICA AGUIRRE MD       Subjective:     Constitutional:         Patient awake and alert in hospital bed.  No family visiting.  She states that pain control is good.  She denies fever, chills, or sweats.  She has ambulated for short distances around the room but not in hallway.  Shortness of breath or chest pain.  No calf pain.       Objective:   Blood pressure 147/67, pulse 106, temperature 98.4 °F (36.9 °C), temperature source Oral, resp. rate 18, height 5' 6\" (1.676 m), weight 184 lb (83.5 kg), SpO2 98%.  Physical Exam  Musculoskeletal:      Comments: Prevena wound VAC dressing clean and sealed.  Nursing reports there was drainage and that the container had to be changed once.  Hemovac put out 70 cc and then 20 cc the last 2 shifts.  Serosanguineous fluid.  No pus.  I pulled the drain.  I replaced Tegaderms and the Prevena wound VAC stayed sealed.  Normal dorsiflexion and plantarflexion right foot and toes.  Both calves soft and nontender.  She can do an active straight leg raise with a little help.  No swelling at all to the right leg and no calf tenderness.  No pitting edema.  Healthy skin.         Results:   Lab Results   Component Value Date    WBC 12.0 (H) 2025    HGB 8.2 (L) 2025    HCT 24.6 (L) 2025    .0 2025    CREATSERUM 1.09 (H) 2025    BUN 22 2025     (L) 2025    K 4.4 2025     2025    CO2 21.0 2025     (H) 2025    CA 8.2 (L) 2025    ALB 4.0 03/15/2025    ALKPHO 125 03/15/2025    BILT 0.2 03/15/2025    TP 7.8 03/15/2025    AST 16 03/15/2025    ALT 17 03/15/2025    INR 0.97 2023    LIP 19 2023    ESRML 50 (H) 2025    CRP 24.30 (H) 2025    MG 2.3 2021     TROP <0.045 06/10/2020    TROPHS 12 07/07/2023    CK 69 03/15/2025       No results found.        Assessment & Plan:     Infection of total right knee replacement  Patient received her PICC line.  She will likely be going to [facility] tomorrow.  I told her it is okay to unbuckle the brace when she is in bed but she must have it on and buckled when in chair or ambulating.  She agreed to those terms.  She should always be using a walker.  Antibiotics as per infectious disease.  Dr. Calderon covering me tomorrow and this weekend.      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

## 2025-03-21 NOTE — DISCHARGE INSTRUCTIONS
okay to unbuckle the brace when she is in bed but she must have it on and buckled when in chair or ambulating. Toe touch wait bearing.

## 2025-03-21 NOTE — PLAN OF CARE
Patient is alert and oriented. On RA. SCDs on for DVT prophylaxis. Voiding freely, using purewick. Up x1 with walker, stand pivot to rolling chair, TTWB. PRN perocet given for pain management. Gel ice packs. Dressing in place to right knee, prevena,  CDI. ACHS accucheck. Call light within reach, bed alarm active.    Problem: Patient Centered Care  Goal: Patient preferences are identified and integrated in the patient's plan of care - Provide timely, complete, and accurate information to patient/family- Incorporate patient and family knowledge, values, beliefs, and cultural backgrounds into the planning and delivery of care- Encourage patient/family to participate in care and decision-making at the level they choose- Honor patient and family perspectives and choices  Outcome: Progressing     Problem: Diabetes/Glucose Control  Goal: Glucose maintained within prescribed range  Description: INTERVENTIONS:- Monitor Blood Glucose as ordered- Assess for signs and symptoms of hyperglycemia and hypoglycemia- Administer ordered medications to maintain glucose within target range- Assess barriers to adequate nutritional intake and initiate nutrition consult as needed- Instruct patient on self management of diabetes  Outcome: Progressing     Problem: PAIN - ADULT  Goal: Verbalizes/displays adequate comfort level or patient's stated pain goal  Description: INTERVENTIONS:- Encourage pt to monitor pain and request assistance- Assess pain using appropriate pain scale- Administer analgesics based on type and severity of pain and evaluate response- Implement non-pharmacological measures as appropriate and evaluate response- Consider cultural and social influences on pain and pain management- Manage/alleviate anxiety- Utilize distraction and/or relaxation techniques- Monitor for opioid side effects- Notify MD/LIP if interventions unsuccessful or patient reports new pain- Anticipate increased pain with activity and pre-medicate as  appropriate  Outcome: Progressing     Problem: RISK FOR INFECTION - ADULT  Goal: Absence of fever/infection during anticipated neutropenic period  Description: INTERVENTIONS- Monitor WBC- Administer growth factors as ordered- Implement neutropenic guidelines  Outcome: Progressing     Problem: SAFETY ADULT - FALL  Goal: Free from fall injury  Description: INTERVENTIONS:- Assess pt frequently for physical needs- Identify cognitive and physical deficits and behaviors that affect risk of falls.- [place] fall precautions as indicated by assessment.- Educate pt/family on patient safety including physical limitations- Instruct pt to call for assistance with activity based on assessment- Modify environment to reduce risk of injury- Provide assistive devices as appropriate- Consider OT/PT consult to assist with strengthening/mobility- Encourage toileting schedule  Outcome: Progressing     Problem: DISCHARGE PLANNING  Goal: Discharge to home or other facility with appropriate resources  Description: INTERVENTIONS:- Identify barriers to discharge w/pt and caregiver- Include patient/family/discharge partner in discharge planning- Arrange for needed discharge resources and transportation as appropriate- Identify discharge learning needs (meds, wound care, etc)- Arrange for interpreters to assist at discharge as needed- Consider post-discharge preferences of patient/family/discharge partner- Complete POLST form as appropriate- Assess patient's ability to be responsible for managing their own health- Refer to Case Management Department for coordinating discharge planning if the patient needs post-hospital services based on physician/LIP order or complex needs related to functional status, cognitive ability or social support system  Outcome: Progressing

## 2025-03-24 ENCOUNTER — APPOINTMENT (OUTPATIENT)
Dept: WOUND CARE | Facility: HOSPITAL | Age: 75
End: 2025-03-24
Attending: FAMILY MEDICINE
Payer: MEDICARE

## 2025-04-18 DIAGNOSIS — M17.11 PRIMARY OSTEOARTHRITIS OF RIGHT KNEE: Primary | ICD-10-CM

## 2025-05-27 DIAGNOSIS — T84.53XD INFECTION ASSOCIATED WITH INTERNAL RIGHT KNEE PROSTHESIS, SUBSEQUENT ENCOUNTER: ICD-10-CM

## 2025-05-27 RX ORDER — HYDROCODONE BITARTRATE AND ACETAMINOPHEN 5; 325 MG/1; MG/1
1 TABLET ORAL EVERY 6 HOURS PRN
Qty: 20 TABLET | Refills: 0 | Status: SHIPPED | OUTPATIENT
Start: 2025-05-27

## 2025-06-04 ENCOUNTER — LAB REQUISITION (OUTPATIENT)
Dept: LAB | Facility: HOSPITAL | Age: 75
End: 2025-06-04
Payer: MEDICARE

## 2025-06-04 DIAGNOSIS — Z96.651 S/P TKR (TOTAL KNEE REPLACEMENT) USING CEMENT, RIGHT: ICD-10-CM

## 2025-06-04 DIAGNOSIS — T84.84XA PAIN DUE TO INTERNAL ORTHOPEDIC PROSTHETIC DEVICES, IMPLANTS AND GRAFTS, INITIAL ENCOUNTER: ICD-10-CM

## 2025-06-04 DIAGNOSIS — T84.53XD INFECTION ASSOCIATED WITH INTERNAL RIGHT KNEE PROSTHESIS, SUBSEQUENT ENCOUNTER: Primary | ICD-10-CM

## 2025-06-04 PROCEDURE — 87070 CULTURE OTHR SPECIMN AEROBIC: CPT | Performed by: ORTHOPAEDIC SURGERY

## 2025-06-04 PROCEDURE — 87205 SMEAR GRAM STAIN: CPT | Performed by: ORTHOPAEDIC SURGERY

## 2025-06-04 PROCEDURE — 87075 CULTR BACTERIA EXCEPT BLOOD: CPT | Performed by: ORTHOPAEDIC SURGERY

## 2025-06-04 RX ORDER — HYDROCODONE BITARTRATE AND ACETAMINOPHEN 5; 325 MG/1; MG/1
1 TABLET ORAL EVERY 6 HOURS PRN
Qty: 20 TABLET | Refills: 0 | Status: SHIPPED | OUTPATIENT
Start: 2025-06-04

## 2025-06-12 DIAGNOSIS — T84.53XD INFECTION ASSOCIATED WITH INTERNAL RIGHT KNEE PROSTHESIS, SUBSEQUENT ENCOUNTER: ICD-10-CM

## 2025-06-12 RX ORDER — HYDROCODONE BITARTRATE AND ACETAMINOPHEN 5; 325 MG/1; MG/1
1 TABLET ORAL EVERY 6 HOURS PRN
Qty: 20 TABLET | Refills: 0 | Status: SHIPPED | OUTPATIENT
Start: 2025-06-12

## 2025-07-07 DIAGNOSIS — T84.53XD INFECTION ASSOCIATED WITH INTERNAL RIGHT KNEE PROSTHESIS, SUBSEQUENT ENCOUNTER: Primary | ICD-10-CM

## 2025-07-07 DIAGNOSIS — Z96.651 S/P TKR (TOTAL KNEE REPLACEMENT) USING CEMENT, RIGHT: ICD-10-CM

## 2025-07-07 DIAGNOSIS — M17.11 PRIMARY OSTEOARTHRITIS OF RIGHT KNEE: ICD-10-CM

## 2025-07-10 ENCOUNTER — LAB ENCOUNTER (OUTPATIENT)
Dept: LAB | Facility: HOSPITAL | Age: 75
End: 2025-07-10
Payer: MEDICARE

## 2025-07-10 DIAGNOSIS — Z96.651 S/P TKR (TOTAL KNEE REPLACEMENT) USING CEMENT, RIGHT: ICD-10-CM

## 2025-07-10 DIAGNOSIS — M17.11 PRIMARY OSTEOARTHRITIS OF RIGHT KNEE: ICD-10-CM

## 2025-07-10 DIAGNOSIS — T84.53XD INFECTION ASSOCIATED WITH INTERNAL RIGHT KNEE PROSTHESIS, SUBSEQUENT ENCOUNTER: ICD-10-CM

## 2025-07-10 LAB
BASOPHILS # BLD AUTO: 0.04 X10(3) UL (ref 0–0.2)
BASOPHILS NFR BLD AUTO: 0.6 %
CRP SERPL-MCNC: <0.5 MG/DL (ref ?–0.5)
DEPRECATED RDW RBC AUTO: 47.6 FL (ref 35.1–46.3)
EOSINOPHIL # BLD AUTO: 0.26 X10(3) UL (ref 0–0.7)
EOSINOPHIL NFR BLD AUTO: 3.6 %
ERYTHROCYTE [DISTWIDTH] IN BLOOD BY AUTOMATED COUNT: 14.8 % (ref 11–15)
ERYTHROCYTE [SEDIMENTATION RATE] IN BLOOD: 33 MM/HR (ref 0–30)
HCT VFR BLD AUTO: 33.2 % (ref 35–48)
HGB BLD-MCNC: 10.8 G/DL (ref 12–16)
IMM GRANULOCYTES # BLD AUTO: 0.03 X10(3) UL (ref 0–1)
IMM GRANULOCYTES NFR BLD: 0.4 %
LYMPHOCYTES # BLD AUTO: 2.45 X10(3) UL (ref 1–4)
LYMPHOCYTES NFR BLD AUTO: 34.2 %
MCH RBC QN AUTO: 28.9 PG (ref 26–34)
MCHC RBC AUTO-ENTMCNC: 32.5 G/DL (ref 31–37)
MCV RBC AUTO: 88.8 FL (ref 80–100)
MONOCYTES # BLD AUTO: 0.53 X10(3) UL (ref 0.1–1)
MONOCYTES NFR BLD AUTO: 7.4 %
NEUTROPHILS # BLD AUTO: 3.85 X10 (3) UL (ref 1.5–7.7)
NEUTROPHILS # BLD AUTO: 3.85 X10(3) UL (ref 1.5–7.7)
NEUTROPHILS NFR BLD AUTO: 53.8 %
PLATELET # BLD AUTO: 213 10(3)UL (ref 150–450)
RBC # BLD AUTO: 3.74 X10(6)UL (ref 3.8–5.3)
WBC # BLD AUTO: 7.2 X10(3) UL (ref 4–11)

## 2025-07-10 PROCEDURE — 86140 C-REACTIVE PROTEIN: CPT

## 2025-07-10 PROCEDURE — 36415 COLL VENOUS BLD VENIPUNCTURE: CPT

## 2025-07-10 PROCEDURE — 85025 COMPLETE CBC W/AUTO DIFF WBC: CPT

## 2025-07-10 PROCEDURE — 85652 RBC SED RATE AUTOMATED: CPT

## 2025-07-15 DIAGNOSIS — T84.53XD INFECTION OF TOTAL RIGHT KNEE REPLACEMENT, SUBSEQUENT ENCOUNTER: Primary | ICD-10-CM

## 2025-08-08 ENCOUNTER — LAB ENCOUNTER (OUTPATIENT)
Dept: LAB | Facility: HOSPITAL | Age: 75
End: 2025-08-08

## 2025-08-08 DIAGNOSIS — I25.10 CORONARY ATHEROSCLEROSIS OF NATIVE CORONARY ARTERY: Primary | ICD-10-CM

## 2025-08-08 DIAGNOSIS — I10 ESSENTIAL HYPERTENSION, MALIGNANT: ICD-10-CM

## 2025-08-08 DIAGNOSIS — T84.53XD INFECTION OF TOTAL RIGHT KNEE REPLACEMENT, SUBSEQUENT ENCOUNTER: ICD-10-CM

## 2025-08-08 DIAGNOSIS — E78.5 HYPERLIPEMIA: ICD-10-CM

## 2025-08-08 LAB
ALBUMIN SERPL-MCNC: 4.2 G/DL (ref 3.2–4.8)
ALBUMIN/GLOB SERPL: 1.4 (ref 1–2)
ALP LIVER SERPL-CCNC: 164 U/L (ref 55–142)
ALT SERPL-CCNC: 17 U/L (ref 10–49)
ANION GAP SERPL CALC-SCNC: 11 MMOL/L (ref 0–18)
AST SERPL-CCNC: 15 U/L (ref ?–34)
BILIRUB SERPL-MCNC: 0.4 MG/DL (ref 0.2–1.1)
BUN BLD-MCNC: 25 MG/DL (ref 9–23)
BUN/CREAT SERPL: 21.9 (ref 10–20)
CALCIUM BLD-MCNC: 9.2 MG/DL (ref 8.7–10.4)
CHLORIDE SERPL-SCNC: 106 MMOL/L (ref 98–112)
CHOLEST SERPL-MCNC: 141 MG/DL (ref ?–200)
CK SERPL-CCNC: 68 U/L (ref 34–145)
CO2 SERPL-SCNC: 23 MMOL/L (ref 21–32)
CREAT BLD-MCNC: 1.14 MG/DL (ref 0.55–1.02)
EGFRCR SERPLBLD CKD-EPI 2021: 50 ML/MIN/1.73M2 (ref 60–?)
ERYTHROCYTE [SEDIMENTATION RATE] IN BLOOD: 37 MM/HR (ref 0–30)
FASTING PATIENT LIPID ANSWER: YES
FASTING STATUS PATIENT QL REPORTED: YES
GLOBULIN PLAS-MCNC: 3.1 G/DL (ref 2–3.5)
GLUCOSE BLD-MCNC: 196 MG/DL (ref 70–99)
HDLC SERPL-MCNC: 48 MG/DL (ref 40–59)
LDLC SERPL CALC-MCNC: 67 MG/DL (ref ?–100)
NONHDLC SERPL-MCNC: 93 MG/DL (ref ?–130)
OSMOLALITY SERPL CALC.SUM OF ELEC: 300 MOSM/KG (ref 275–295)
POTASSIUM SERPL-SCNC: 4.1 MMOL/L (ref 3.5–5.1)
PROT SERPL-MCNC: 7.3 G/DL (ref 5.7–8.2)
SODIUM SERPL-SCNC: 140 MMOL/L (ref 136–145)
TRIGL SERPL-MCNC: 149 MG/DL (ref 30–149)
VLDLC SERPL CALC-MCNC: 23 MG/DL (ref 0–30)

## 2025-08-08 PROCEDURE — 85652 RBC SED RATE AUTOMATED: CPT

## 2025-08-08 PROCEDURE — 80061 LIPID PANEL: CPT

## 2025-08-08 PROCEDURE — 36415 COLL VENOUS BLD VENIPUNCTURE: CPT

## 2025-08-08 PROCEDURE — 82550 ASSAY OF CK (CPK): CPT

## 2025-08-08 PROCEDURE — 80053 COMPREHEN METABOLIC PANEL: CPT

## 2025-08-12 DIAGNOSIS — T84.53XD INFECTION ASSOCIATED WITH INTERNAL RIGHT KNEE PROSTHESIS, SUBSEQUENT ENCOUNTER: Primary | ICD-10-CM

## 2025-08-13 ENCOUNTER — TELEPHONE (OUTPATIENT)
Dept: OTOLARYNGOLOGY | Facility: CLINIC | Age: 75
End: 2025-08-13

## 2025-08-15 ENCOUNTER — LAB ENCOUNTER (OUTPATIENT)
Dept: LAB | Facility: HOSPITAL | Age: 75
End: 2025-08-15
Attending: STUDENT IN AN ORGANIZED HEALTH CARE EDUCATION/TRAINING PROGRAM

## 2025-08-15 DIAGNOSIS — E78.5 HYPERLIPEMIA: ICD-10-CM

## 2025-08-15 DIAGNOSIS — I10 ESSENTIAL HYPERTENSION, MALIGNANT: ICD-10-CM

## 2025-08-15 DIAGNOSIS — T84.84XA PAIN DUE TO HIP JOINT PROSTHESIS: ICD-10-CM

## 2025-08-15 DIAGNOSIS — Z96.649 PAIN DUE TO HIP JOINT PROSTHESIS: ICD-10-CM

## 2025-08-15 DIAGNOSIS — I25.10 CORONARY ATHEROSCLEROSIS OF NATIVE CORONARY ARTERY: Primary | ICD-10-CM

## 2025-08-15 LAB
ALBUMIN SERPL-MCNC: 4.2 G/DL (ref 3.2–4.8)
ALBUMIN/GLOB SERPL: 1.4 (ref 1–2)
ALP LIVER SERPL-CCNC: 178 U/L (ref 55–142)
ALT SERPL-CCNC: 17 U/L (ref 10–49)
ANION GAP SERPL CALC-SCNC: 8 MMOL/L (ref 0–18)
AST SERPL-CCNC: 15 U/L (ref ?–34)
BILIRUB SERPL-MCNC: 0.4 MG/DL (ref 0.2–1.1)
BUN BLD-MCNC: 26 MG/DL (ref 9–23)
BUN/CREAT SERPL: 21.1 (ref 10–20)
CALCIUM BLD-MCNC: 9.1 MG/DL (ref 8.7–10.4)
CHLORIDE SERPL-SCNC: 106 MMOL/L (ref 98–112)
CHOLEST SERPL-MCNC: 135 MG/DL (ref ?–200)
CK SERPL-CCNC: 61 U/L (ref 34–145)
CO2 SERPL-SCNC: 24 MMOL/L (ref 21–32)
CREAT BLD-MCNC: 1.23 MG/DL (ref 0.55–1.02)
EGFRCR SERPLBLD CKD-EPI 2021: 46 ML/MIN/1.73M2 (ref 60–?)
ERYTHROCYTE [SEDIMENTATION RATE] IN BLOOD: 46 MM/HR (ref 0–30)
FASTING PATIENT LIPID ANSWER: YES
FASTING STATUS PATIENT QL REPORTED: YES
GLOBULIN PLAS-MCNC: 3.1 G/DL (ref 2–3.5)
GLUCOSE BLD-MCNC: 212 MG/DL (ref 70–99)
HDLC SERPL-MCNC: 50 MG/DL (ref 40–59)
LDLC SERPL CALC-MCNC: 67 MG/DL (ref ?–100)
NONHDLC SERPL-MCNC: 85 MG/DL (ref ?–130)
OSMOLALITY SERPL CALC.SUM OF ELEC: 297 MOSM/KG (ref 275–295)
POTASSIUM SERPL-SCNC: 4.4 MMOL/L (ref 3.5–5.1)
PROT SERPL-MCNC: 7.3 G/DL (ref 5.7–8.2)
SODIUM SERPL-SCNC: 138 MMOL/L (ref 136–145)
TRIGL SERPL-MCNC: 96 MG/DL (ref 30–149)
VLDLC SERPL CALC-MCNC: 14 MG/DL (ref 0–30)

## 2025-08-15 PROCEDURE — 80061 LIPID PANEL: CPT

## 2025-08-15 PROCEDURE — 82550 ASSAY OF CK (CPK): CPT

## 2025-08-15 PROCEDURE — 85652 RBC SED RATE AUTOMATED: CPT

## 2025-08-15 PROCEDURE — 80053 COMPREHEN METABOLIC PANEL: CPT

## 2025-08-15 PROCEDURE — 36415 COLL VENOUS BLD VENIPUNCTURE: CPT

## 2025-08-19 ENCOUNTER — OFFICE VISIT (OUTPATIENT)
Dept: OTOLARYNGOLOGY | Facility: CLINIC | Age: 75
End: 2025-08-19

## 2025-08-19 DIAGNOSIS — J30.9 ALLERGIC RHINITIS, UNSPECIFIED SEASONALITY, UNSPECIFIED TRIGGER: Primary | ICD-10-CM

## 2025-08-19 PROCEDURE — 99213 OFFICE O/P EST LOW 20 MIN: CPT | Performed by: OTOLARYNGOLOGY

## 2025-08-19 RX ORDER — LORATADINE 10 MG/1
10 TABLET ORAL DAILY
Qty: 90 TABLET | Refills: 3 | Status: SHIPPED | OUTPATIENT
Start: 2025-08-19

## (undated) DEVICE — GAMMEX® PI HYBRID SIZE 6.5, STERILE POWDER-FREE SURGICAL GLOVE, POLYISOPRENE AND NEOPRENE BLEND: Brand: GAMMEX

## (undated) DEVICE — NEEDLE HYPO 18GA L1.5IN PNK SS PVT SHLD BVL

## (undated) DEVICE — SLIM BODY SKIN STAPLER: Brand: APPOSE ULC

## (undated) DEVICE — GAMMEX® NON-LATEX PI ORTHO SIZE 7, STERILE POLYISOPRENE POWDER-FREE SURGICAL GLOVE: Brand: GAMMEX

## (undated) DEVICE — WRAP COOLING KNEE W/ICE PILLOW

## (undated) DEVICE — Device: Brand: STABLECUT®

## (undated) DEVICE — Device

## (undated) DEVICE — AEGIS 1" DISK 4MM HOLE, PEEL OPEN: Brand: MEDLINE

## (undated) DEVICE — SUT MONOCRYL 3-0 PS-1 Y936H

## (undated) DEVICE — HOOD: Brand: FLYTE

## (undated) DEVICE — SYRINGE CATH TIP 50ML TIP SHLD DISP

## (undated) DEVICE — SOLUTION IRRIG 1000ML 0.9% NACL USP BTL

## (undated) DEVICE — SUT VICRYL 2-0 FS-1 J443H

## (undated) DEVICE — BNDG COHESIVE W4INXL5YD TAN E

## (undated) DEVICE — VIOLET BRAIDED (POLYGLACTIN 910), SYNTHETIC ABSORBABLE SUTURE: Brand: COATED VICRYL

## (undated) DEVICE — APPLICATOR PREP 26ML CHG 2% ISO ALC 70%

## (undated) DEVICE — DRAPE SHEET LARGE 76X55

## (undated) DEVICE — PACK CDS TOTAL KNEE

## (undated) DEVICE — KIT NEG PRSS PREVENA DRAIN 20CM INCIS MGMT PEEL PALACE

## (undated) DEVICE — TOTAL KNEE: Brand: MEDLINE INDUSTRIES, INC.

## (undated) DEVICE — DRESSING 10X4IN ANMC SAFETAC

## (undated) DEVICE — SPINOCAN® 18 GA. X 3-1/2 IN. (90 MM) SPINAL NEEDLE: Brand: SPINOCAN®

## (undated) DEVICE — ANTIBACTERIAL UNDYED BRAIDED (POLYGLACTIN 910), SYNTHETIC ABSORBABLE SUTURE: Brand: COATED VICRYL

## (undated) DEVICE — 3M™ TEGADERM™ TRANSPARENT FILM DRESSING FRAME STYLE, 1626W, 4 IN X 4-3/4 IN (10 CM X 12 CM), 50/CT 4CT/CASE: Brand: 3M™ TEGADERM™

## (undated) DEVICE — DRAPE,U/ SHT,SPLIT,PLAS,STERIL: Brand: MEDLINE

## (undated) DEVICE — TOWEL,OR,DSP,ST,BLUE,DLX,2/PK,40PK/CS: Brand: MEDLINE

## (undated) DEVICE — APPLICATOR CHLORAPREP 26ML

## (undated) DEVICE — GAUZE TRAY STERILE 4X4 12PLY

## (undated) DEVICE — SYRINGE 35ML LL TIP

## (undated) DEVICE — CEMENT MIXING SYSTEM WITH FEMORAL BREAKWAY NOZZLE: Brand: REVOLUTION

## (undated) DEVICE — GAMMEX® PI HYBRID SIZE 7.5, STERILE POWDER-FREE SURGICAL GLOVE, POLYISOPRENE AND NEOPRENE BLEND: Brand: GAMMEX

## (undated) DEVICE — 2.3MM X 22.0MM TAPERED ROUTER

## (undated) DEVICE — 3M™ COBAN™ NL STERILE NON-LATEX SELF-ADHERENT WRAP, 2084S, 4 IN X 5 YD (10 CM X 4,5 M), 18 ROLLS/CASE: Brand: 3M™ COBAN™

## (undated) DEVICE — SOLUTION  .9 3000ML

## (undated) DEVICE — DERMABOND CLOSURE 0.7ML TOPICL

## (undated) DEVICE — BRACE KNEE UPTO 30.5IN FOR 17-27IN THGH UNIV

## (undated) DEVICE — DISPOSABLE TOURNIQUET CUFF SINGLE BLADDER, DUAL PORT AND QUICK CONNECT CONNECTOR: Brand: COLOR CUFF

## (undated) DEVICE — RECIPROCATING BLADE, DOUBLE SIDED, OFFSET  (70.0 X 0.64 X 12.6MM)

## (undated) DEVICE — INTENT TO BE USED WITH SUTURE MATERIAL FOR TISSUE CLOSURE: Brand: RICHARD-ALLAN® NEEDLE 3/8 CIRCLE TROCAR

## (undated) DEVICE — SCREW HEADFRAME FEM HEX 2.5MM: Type: IMPLANTABLE DEVICE | Site: KNEE

## (undated) DEVICE — PROXIMATE SKIN STAPLERS (35 WIDE) CONTAINS 35 STAINLESS STEEL STAPLES (FIXED HEAD): Brand: PROXIMATE

## (undated) DEVICE — 2DE14 2-0 PDO 24 X 24: Brand: 2DE14 2-0 PDO 24 X 24

## (undated) DEVICE — GAMMEX® NON-LATEX PI ORTHO SIZE 8, STERILE POLYISOPRENE POWDER-FREE SURGICAL GLOVE: Brand: GAMMEX

## (undated) DEVICE — DUAL CUT SAGITTAL BLADE

## (undated) DEVICE — SUT ETHBND XL 0 18IN NABSRB GRN CR 48MM CTX 1/2 CIR

## (undated) DEVICE — 3M™ TEGADERM™ TRANSPARENT FILM DRESSING FRAME STYLE, 1628, 6 IN X 8 IN (15 CM X 20 CM), 10/CT 8CT/CASE: Brand: 3M™ TEGADERM™

## (undated) DEVICE — 3M™ TEGADERM™ HP TRANSPARENT FILM DRESSING FRAME STYLE, 9534HP, 2-3/8 X 2-3/4 IN (6 CM X 7 CM), 100/CT 4CT/CASE: Brand: 3M™ TEGADERM™

## (undated) DEVICE — SUT VCRL 1-0 36IN CTX ABSRB UD L48MM 1/2 CIR

## (undated) DEVICE — SOL NACL IRRIG 0.9% 1000ML BTL

## (undated) DEVICE — OCCLUSIVE GAUZE STRIP OVERWRAP,3% BISMUTH TRIBROMOPHENATE IN PETROLATUM BLEND: Brand: XEROFORM

## (undated) DEVICE — STRYKER PERFORMANCE SERIES SAGITTAL BLADE: Brand: STRYKER PERFORMANCE SERIES

## (undated) DEVICE — SOLUTION IRRIG 1000ML WND LAV BACTISURE

## (undated) DEVICE — BANDAGE COMPR 6INX12FT SMTH FOR LIMB EXSANG

## (undated) DEVICE — SOLUTION IRRIG 3000ML 0.9% NACL FLX CONT

## (undated) DEVICE — KIT EVAC 400CC DIA1/8IN H PAT 12.5IN 3 SPR

## (undated) DEVICE — 3 BONE CEMENT MIXER: Brand: MIXEVAC

## (undated) DEVICE — 3M™ IOBAN™ 2 ANTIMICROBIAL INCISE DRAPE 6650EZ: Brand: IOBAN™ 2

## (undated) NOTE — LETTER
AUTHORIZATION FOR SURGICAL OPERATION OR OTHER PROCEDURE    1.  I hereby authorize PATRICA Kurtz, and [facility] staff assigned to my case to perform the following operation and/or procedure at the [facility]:    Cortisone i Time:  ________ A. M.  P.M.        Patient Name:  ______________________________________________________  (please print)      Patient signature:  ___________________________________________________             Relationship to Patient:

## (undated) NOTE — LETTER
AUTHORIZATION FOR SURGICAL OPERATION OR OTHER PROCEDURE    1. I hereby authorize Dr. Houston Rivera, and [facility] staff assigned to my case to perform the following operation and/or procedure at the [facility] site:    ___________________________________________Aspiration of the Right knee ____________________________________________________      _______________________________________________________________________________________________    2.  My physician has explained the nature and purpose of the operation or other procedure, possible alternative methods of treatment, the risks involved, and the possibility of complication to me.  I acknowledge that no guarantee has been made as to the result that may be obtained.  3.  I recognize that, during the course of this operation, or other procedure, unforseen conditions may necessitate additional or different procedure than those listed above.  I, therefore, further authorize and request that the above named physician, his/her physician assistants or designees perform such procedures as are, in his/her professional opinion, necessary and desirable.  4.  Any tissue or organs removed in the operation or other procedure may be disposed of by and at the discretion of the [facility] and [facility].  5.  I understand that in the event of a medical emergency, I will be transported by local paramedics to [facility] or other hospital emergency department.  6.  I certify that I have read and fully understand the above consent to operation and/or other procedure.    7.  I acknowledge that my physician has explained sedation/analgesia administration to me including the risks and benefits.  I consent to the administration of sedation/analgesia as may be necessary or desirable in the judgement of my physician.    Witness signature: ___________________________________________________ Date:   ______/______/_____                    Time:  ________ A.M.  P.M.       Patient Name:  ______________________________________________________  (please print)      Patient signature:  ___________________________________________________             Relationship to Patient:           []  Parent    Responsible person                          []  Spouse  In case of minor or                    [] Other  _____________   Incompetent name:  __________________________________________________                               (please print)      _____________      Responsible person  In case of minor or  Incompetent signature:  _______________________________________________    Statement of Physician  My signature below affirms that prior to the time of the procedure, I have explained to the patient and/or his/her guardian, the risks and benefits involved in the proposed treatment and any reasonable alternative to the proposed treatment.  I have also explained the risks and benefits involved in the refusal of the proposed treatment and have answered the patient's questions.                        Date:  ______/______/_______  Provider                      Signature:  __________________________________________________________       Time:  ___________ A.M    P.M.

## (undated) NOTE — LETTER
AUTHORIZATION FOR SURGICAL OPERATION OR OTHER PROCEDURE    1. I hereby authorize Dr. Amol Borden and [facility] staff assigned to my case to perform the following operation and/or procedure at the [facility]:    _______________________________________________________________________________________________    Cortisone injection Right knee   _______________________________________________________________________________________________    2. My physician has explained the nature and purpose of the operation or other procedure, possible alternative methods of treatment, the risks involved, and the possibility of complication to me. I acknowledge that no guarantee has been made as to the result that may be obtained. 3.  I recognize that, during the course of this operation, or other procedure, unforseen conditions may necessitate additional or different procedure than those listed above. I, therefore, further authorize and request that the above named physician, his/her physician assistants or designees perform such procedures as are, in his/her professional opinion, necessary and desirable. 4.  Any tissue or organs removed in the operation or other procedure may be disposed of by and at the discretion of the [facility] and [facility] AT [facility]. 5.  I understand that in the event of a medical emergency, I will be transported by local paramedics to [facility] or other hospital emergency department. 6.  I certify that I have read and fully understand the above consent to operation and/or other procedure. 7.  I acknowledge that my physician has explained sedation/analgesia administration to me including the risks and benefits. I consent to the administration of sedation/analgesia as may be necessary or desirable in the judgement of my physician.     Witness signature: ___________________________________________________ Date: ______/______/_____                    Time:  ________ A. M.  P.M. Patient Name:  ______________________________________________________  (please print)      Patient signature:  ___________________________________________________             Relationship to Patient:           []  Parent    Responsible person                          []  Spouse  In case of minor or                    [] Other  _____________   Incompetent name:  __________________________________________________                               (please print)      _____________      Responsible person  In case of minor or  Incompetent signature:  _______________________________________________    Statement of Physician  My signature below affirms that prior to the time of the procedure, I have explained to the patient and/or his/her guardian, the risks and benefits involved in the proposed treatment and any reasonable alternative to the proposed treatment. I have also explained the risks and benefits involved in the refusal of the proposed treatment and have answered the patient's questions.                         Date:  ______/______/_______  Provider                      Signature:  __________________________________________________________       Time:  ___________ A.M    P.M.

## (undated) NOTE — LETTER
[address]  Authorization for Surgical Operation and Procedure                                                                                           I hereby authorize Saul Baig MD, my physician and his/her assistants (if applicable), which may include medical students, residents, and/or fellows, to perform the following surgical operation/ procedure and administer such anesthesia as may be determined necessary by my physician: Operation/Procedure name (s) Right total knee arthroplasty on Maciej Estrada   2. I recognize that during the surgical operation/procedure, unforeseen conditions may necessitate additional or different procedures than those listed above. I, therefore, further authorize and request that the above-named surgeon, assistants, or designees perform such procedures as are, in their judgment, necessary and desirable. 3.   My surgeon/physician has discussed prior to my surgery the potential benefits, risks and side effects of this procedure; the likelihood of achieving goals; and potential problems that might occur during recuperation. They also discussed reasonable alternatives to the procedure, including risks, benefits, and side effects related to the alternatives and risks related to not receiving this procedure. I have had all my questions answered and I acknowledge that no guarantee has been made as to the result that may be obtained. 4.   Should the need arise during my operation/procedure, which includes change of level of care prior to discharge, I also consent to the administration of blood and/or blood products. Further, I understand that despite careful testing and screening of blood or blood products by collecting agencies, I may still be subject to ill effects as a result of receiving a blood transfusion and/or blood products.   The following are some, but not all, of the potential risks that can occur: fever and allergic reactions, hemolytic reactions, transmission of diseases such as Hepatitis, AIDS and Cytomegalovirus (CMV) and fluid overload. In the event that I wish to have an autologous transfusion of my own blood, or a directed donor transfusion, I will discuss this with my physician. Check only if Refusing Blood or Blood Products  I understand refusal of blood or blood products as deemed necessary by my physician may have serious consequences to my condition to include possible death. I hereby assume responsibility for my refusal and release the hospital, its personnel, and my physicians from any responsibility for the consequences of my refusal.    o  Refuse   5. I authorize the use of any specimen, organs, tissues, body parts or foreign objects that may be removed from my body during the operation/procedure for diagnosis, research or teaching purposes and their subsequent disposal by hospital authorities. I also authorize the release of specimen test results and/or written reports to my treating physician on the hospital medical staff or other referring or consulting physicians involved in my care, at the discretion of the Pathologist or my treating physician. 6.   I consent to the photographing or videotaping of the operations or procedures to be performed, including appropriate portions of my body for medical, scientific, or educational purposes, provided my identity is not revealed by the pictures or by descriptive texts accompanying them. If the procedure has been photographed/videotaped, the surgeon will obtain the original picture, image, videotape or CD. The hospital will not be responsible for storage, release or maintenance of the picture, image, tape or CD.    7.   I consent to the presence of a  or observers in the operating room as deemed necessary by my physician or their designees.     8.   I recognize that in the event my procedure results in extended X-Ray/fluoroscopy time, I may develop a skin reaction. 9. If I have a Do Not Attempt Resuscitation (DNAR) order in place, that status will be suspended while in the operating room, procedural suite, and during the recovery period unless otherwise explicitly stated by me (or a person authorized to consent on my behalf). The surgeon or my attending physician will determine when the applicable recovery period ends for purposes of reinstating the DNAR order. 10. Patients having a sterilization procedure: I understand that if the procedure is successful the results will be permanent and it will therefore be impossible for me to inseminate, conceive, or bear children. I also understand that the procedure is intended to result in sterility, although the result has not been guaranteed. 11. I acknowledge that my physician has explained sedation/analgesia administration to me including the risk and benefits I consent to the administration of sedation/analgesia as may be necessary or desirable in the judgment of my physician. I CERTIFY THAT I HAVE READ AND FULLY UNDERSTAND THE ABOVE CONSENT TO OPERATION and/or OTHER PROCEDURE.     _________________________________________ _________________________________     ___________________________________  Signature of Patient     Signature of Responsible Person                   Printed Name of Responsible Person                              _________________________________________ ______________________________        ___________________________________  Signature of Witness         Date  Time         Relationship to Patient    STATEMENT OF PHYSICIAN My signature below affirms that prior to the time of the procedure; I have explained to the patient and/or his/her legal representative, the risks and benefits involved in the proposed treatment and any reasonable alternative to the proposed treatment.  I have also explained the risks and benefits involved in refusal of the proposed treatment and alternatives to the proposed treatment and have answered the patient's questions.  If I have a significant financial interest in a co-management agreement or a significant financial interest in any product or implant, or other significant relationship used in this procedure/surgery, I have disclosed this and had a discussion with my patient.     _______________________________________________________________ _____________________________  [place] of Physician)                                                                                         (Date)                                   (Time)  Patient Name: Maciej Estrada    : 1950   Printed: 2023      Medical Record #: [id number]                                              Page 1 of 1

## (undated) NOTE — LETTER
[facility]  part of [facility]     PICC INSERTION CONSENT     I agree to have a Peripherally Inserted Central Catheter (PICC) placed in my arm.   1. The PICC insertion procedure, care, maintenance, risks, benefits, and complications have been explained to me by my physician, ________________________, and I understand them.   2. I understand that this may not be the only way I can receive my medication. I understand that my physician has determined that the PICC would be the safest and most effective means of administering my medication at this time. If there are other options of giving medication into my veins those options have been explained to me by my physician and I have chosen this one.   3. I realize a nurse who has been specially trained and certified by the hospital and ’s representative to insert a PICC will perform this procedure. My catheter will be inserted by _____________________________.   4. I have been informed by my doctor of the nature and purpose of this procedure and the risks involved and the possibility of complications. I realize that this is an invasive procedure and has certain risks such as air embolism (air entering the catheter or my vein), arterial puncture (a tearing of one of my arteries), infection, irregular heartbeat and venous thrombosis (a blood clot in a vein) nerve injury and fracture of the catheter with or without migration.   5. In order to numb the area where the line will be placed, a small amount of anesthetic medication will be injected as ordered by my physician.   6. I understand that while the catheter will be placed in my upper arm the end of the catheter will come to rest in an area near my heart.     7. The person performing this procedure has discussed the potential benefits, risks, and side effects of the PICC; the likelihood of achieving goals; and potential problems that might occur during recuperation. They also discussed  reasonable alternatives to the PICC, including risks, benefits, and side effects related to the alternatives and risks related to not receiving this procedure.    8.  I have expressed any questions about this procedure to my physician or the PICC Proceduralist and he/she has answered them.  I certify that I have read and understand this consent to the insertion of a PICC.      _________________________________________________________   Date     Time     Patient/Guardian Signature       ____________________________________   Printed name of Patient/Guardian          ________________________________________________________________    Date        Time                   Witnessing RN Signature      Patient Name: Nina Baker     : 1950                 Printed: 2025     Medical Record #: [id number]

## (undated) NOTE — LETTER
AUTHORIZATION FOR SURGICAL OPERATION OR OTHER PROCEDURE    1. I hereby authorize Dr. Reynaldo Rankin, and [email or web address] staff assigned to my case to perform the following operation and/or procedure at the [facility] [place]:    _______________________________________________________________________________________________    Durolane Injection Right knee  _______________________________________________________________________________________________    2. My physician has explained the nature and purpose of the operation or other procedure, possible alternative methods of treatment, the risks involved, and the possibility of complication to me. I acknowledge that no guarantee has been made as to the result that may be obtained. 3.  I recognize that, during the course of this operation, or other procedure, unforseen conditions may necessitate additional or different procedure than those listed above. I, therefore, further authorize and request that the above named physician, his/her physician assistants or designees perform such procedures as are, in his/her professional opinion, necessary and desirable. 4.  Any tissue or organs removed in the operation or other procedure may be disposed of by and at the discretion of the [facility] [place] and [facility] AT [facility]. 5.  I understand that in the event of a medical emergency, I will be transported by local paramedics to [facility] or other hospital emergency department. 6.  I certify that I have read and fully understand the above consent to operation and/or other procedure. 7.  I acknowledge that my physician has explained sedation/analgesia administration to me including the risks and benefits. I consent to the administration of sedation/analgesia as may be necessary or desirable in the judgement of my physician.     Witness signature: ___________________________________________________ Date:  ______/______/_____                    Time: ________ A. M.  P.M. Patient Name:  ______________________________________________________  (please print)      Patient signature:  ___________________________________________________             Relationship to Patient:           []  Parent    Responsible person                          []  Spouse  In case of minor or                    [] Other  _____________   Incompetent name:  __________________________________________________                               (please print)      _____________      Responsible person  In case of minor or  Incompetent signature:  _______________________________________________    Statement of Physician  My signature below affirms that prior to the time of the procedure, I have explained to the patient and/or his/her guardian, the risks and benefits involved in the proposed treatment and any reasonable alternative to the proposed treatment. I have also explained the risks and benefits involved in the refusal of the proposed treatment and have answered the patient's questions.                         Date:  ______/______/_______  Provider                      Signature:  __________________________________________________________       Time:  ___________ A.M    P.M.

## (undated) NOTE — LETTER
AUTHORIZATION FOR SURGICAL OPERATION OR OTHER PROCEDURE    1. I hereby authorize Houston Reyes, and [facility] staff assigned to my case to perform the following operation and/or procedure at the [facility] site:    _______________________________________________________________________________________________    Right knee cortisone injection  _______________________________________________________________________________________________    2.  My physician has explained the nature and purpose of the operation or other procedure, possible alternative methods of treatment, the risks involved, and the possibility of complication to me.  I acknowledge that no guarantee has been made as to the result that may be obtained.  3.  I recognize that, during the course of this operation, or other procedure, unforseen conditions may necessitate additional or different procedure than those listed above.  I, therefore, further authorize and request that the above named physician, his/her physician assistants or designees perform such procedures as are, in his/her professional opinion, necessary and desirable.  4.  Any tissue or organs removed in the operation or other procedure may be disposed of by and at the discretion of the [facility] and [facility].  5.  I understand that in the event of a medical emergency, I will be transported by local paramedics to [facility] or other hospital emergency department.  6.  I certify that I have read and fully understand the above consent to operation and/or other procedure.    7.  I acknowledge that my physician has explained sedation/analgesia administration to me including the risks and benefits.  I consent to the administration of sedation/analgesia as may be necessary or desirable in the judgement of my physician.    Witness signature: ___________________________________________________ Date:   ______/______/_____                    Time:  ________ A.M.  P.M.       Patient Name:  ______________________________________________________  (please print)      Patient signature:  ___________________________________________________             Relationship to Patient:           []  Parent    Responsible person                          []  Spouse  In case of minor or                    [] Other  _____________   Incompetent name:  __________________________________________________                               (please print)      _____________      Responsible person  In case of minor or  Incompetent signature:  _______________________________________________    Statement of Physician  My signature below affirms that prior to the time of the procedure, I have explained to the patient and/or his/her guardian, the risks and benefits involved in the proposed treatment and any reasonable alternative to the proposed treatment.  I have also explained the risks and benefits involved in the refusal of the proposed treatment and have answered the patient's questions.                        Date:  ______/______/_______  Provider                      Signature:  __________________________________________________________       Time:  ___________ A.M    P.M.

## (undated) NOTE — LETTER
Hospital Discharge Documentation  Please phone to schedule a hospital follow up appointment.    From: [facility] Hospitalist's Office  Phone: [phone]    Patient discharged time/date: 3/21/2025  1:18 PM  Patient discharge disposition:  SNF Subacute Rehab, patient discharged to [facility]       Discharge Summary - D/C Summary        Discharge Summary signed by Chip Foreman MD at 3/21/2025  1:39 PM  Version 1 of 1      Author: Chip Foreman MD Service: Hospitalist Author Type: Physician    Filed: 3/21/2025  1:39 PM Date of Service: 3/21/2025  1:38 PM Status: Signed    : Chip Foreman MD (Physician)           [facility]  part of [facility]     DISCHARGE SUMMARY     Nina Baker Patient Status:  Inpatient    1950 MRN [id number]   Location [facility] 4W/SW/SE Attending No att. providers found   Hosp Day # 3 PCP JESSICA AGUIRRE MD     DATE OF ADMISSION: 3/18/2025  DATE OF DISCHARGE: 3/21/2025   DISPOSITION: subacute rehab  CONDITION ON DISCHARGE: good    DISCHARGE DIAGNOSES:  Prosthetic joint infection, right knee  Type 2 diabetes  Coronary artery disease  Hypertension  Dyslipidemia  CKD 3    Procedure(s):  Excisional debridement, explanted components, infected right knee replacement, and implantation of antibiotic cement spacers, wound vac placement     HISTORY OF PRESENT ILLNESS (COPIED FROM ADMISSION H&P)  Patient is a 74-year-old  female with history of right total knee arthroplasty in 2023. Two to 3 weeks ago, started developing itching and blisters around the surgical wound and started developing drainage. She was evaluated by her orthopedic surgeon, Dr. Rivera, who scheduled her today for above-mentioned procedure for irrigation and debridement initially. Intraoperatively, she was found to have prosthetic joint infection, hardware was explanted, and had an antibiotic spacer placed, deep cultures were obtained, and transferred to PACU  for further monitoring.     HOSPITAL COURSE:  Patient was admitted for elective surgery as described above.  Postoperatively she did well but deemed to be appropriate for subacute rehab.  Seen by infectious disease, will finish prolonged course of IV antibiotics.  She understands her activity restrictions.    Patient understands return to the emergency room for increased pain, fever, discharge, shortness of breath, chest pain, new neurologic symptoms, other concerning symptoms.    PHYSICAL EXAM:  Temp:  [97.8 °F (36.6 °C)-99.2 °F (37.3 °C)] 97.8 °F (36.6 °C)  Pulse:  [] 86  Resp:  [18-20] 18  BP: ()/(56-80) 140/56  SpO2:  [98 %-99 %] 98 %  Gen: A+Ox3.  No distress.   HEENT: NCAT, neck supple, no carotid bruit.  CV: RRR, S1S2, and intact distal pulses. No gallop, rub, murmur.  Pulm: Effort and breath sounds normal. No distress, wheezes, rales, rhonchi.  Abd: Soft, NTND, BS normal, no mass, no HSM, no rebound/guarding.   Neuro: Normal reflexes, CN. Sensory/motor exams grossly normal deficit. Coordination  and gait normal.   MS: No joint effusions.  No peripheral edema.  Right knee incision clean dry and intact, mini wound VAC in place  Skin: Skin is warm and dry. No rashes, erythema, diaphoresis.   Psych: Normal mood and affect. Behavior and judgment normal.     DISCHARGE MEDICATIONS     Discharge Medications        START taking these medications        Instructions Prescription details   acetaminophen 325 MG Tabs  Commonly known as: Tylenol  Replaces: Acetaminophen 500 MG Caps      Take 2 tablets (650 mg total) by mouth every 8 (eight) hours as needed.   Refills: 0     cefTRIAXone in dextrose 5% IVPB premix  Commonly known as: Rocephin      Inject 50 mL (2 g total) into the vein daily.   Stop taking on: April 29, 2025  Quantity: 2000 mL  Refills: 0     HYDROcodone-acetaminophen 5-325 MG Tabs  Commonly known as: Norco      Take 1 tablet by mouth every 6 (six) hours as needed for Pain.   Quantity: 20  tablet  Refills: 0     insulin degludec 100 units/mL Sopn  Commonly known as: Tresiba  Start taking on: March 22, 2025      Inject 10 Units into the skin daily.   Refills: 0     Naloxone HCl 4 MG/0.1ML Liqd      4 mg by Intranasal route as needed (May repeat as needed in other nostril if symptoms persist). If patient remains unresponsive, repeat dose in other nostril 2-5 minutes after first dose.   Quantity: 1 kit  Refills: 0     Polyethylene Glycol 3350 17 g Pack  Commonly known as: MIRALAX      Take 17 g by mouth daily as needed.   Refills: 0     rivaroxaban 10 MG Tabs  Commonly known as: Xarelto      Take 1 tablet (10 mg total) by mouth daily. Give with food.   Quantity: 30 tablet  Refills: 0     vancomycin in sodium chloride 0.9% 1.25 g/250mL Soln  Commonly known as: Vancocin      Inject 250 mL (1.25 g total) into the vein daily.   Stop taking on: April 29, 2025  Quantity: 65706 mL  Refills: 0            CHANGE how you take these medications        Instructions Prescription details   docusate sodium 100 MG Caps  Commonly known as: COLACE  What changed:   how much to take  when to take this  reasons to take this      Take 100 mg by mouth 2 (two) times daily.   Refills: 0            CONTINUE taking these medications        Instructions Prescription details   atorvastatin 20 MG Tabs  Commonly known as: Lipitor      Take 1 tablet (20 mg total) by mouth nightly.   Quantity: 30 tablet  Refills: 5     calcium carbonate-vitamin D 250-3.125 MG-MCG Tabs  Commonly known as: Oyster Shell-D      Take 1 tablet by mouth 2 (two) times daily with meals.   Quantity: 60 tablet  Refills: 0     glipiZIDE 5 MG Tabs  Commonly known as: Glucotrol      Take 2 tablets (10 mg total) by mouth 2 (two) times daily before meals.   Refills: 2     Januvia 100 MG Tabs  Generic drug: SITagliptin Phosphate  Notes to patient: Continue home schedule      Take 1 tablet (100 mg total) by mouth daily.   Refills: 0     loratadine 10 MG Tabs  Commonly  known as: Claritin  Notes to patient: Continue home schedule      TAKE 1 TABLET BY MOUTH EVERY DAY   Quantity: 90 tablet  Refills: 0     losartan 25 MG Tabs  Commonly known as: Cozaar      Take 1 tablet (25 mg total) by mouth daily.   Refills: 0     Lyrica 300 MG Caps  Generic drug: pregabalin      Take 1 capsule (300 mg total) by mouth nightly.   Refills: 3     montelukast 10 MG Tabs  Commonly known as: Singulair      Take 1 tablet (10 mg total) by mouth nightly.   Quantity: 30 tablet  Refills: 3     pantoprazole 40 MG Tbec  Commonly known as: Protonix      Take 1 tablet (40 mg total) by mouth every morning before breakfast.   Refills: 0            STOP taking these medications      Acetaminophen 500 MG Caps  Replaced by: acetaminophen 325 MG Tabs        ALPRAZolam 0.25 MG Tabs  Commonly known as: Xanax        aspirin 81 MG Tbec        diphenhydrAMINE 25 MG Caps  Commonly known as: Banophen        doxycycline 100 MG Caps  Commonly known as: Vibramycin        ibuprofen 600 MG Tabs  Commonly known as: Motrin        sulfamethoxazole-trimethoprim -160 MG Tabs per tablet  Commonly known as: Bactrim DS        ticagrelor 90 MG Tabs  Commonly known as: Brilinta        traMADol 50 MG Tabs  Commonly known as: Ultram                  Where to Get Your Medications        These medications were sent to [id number] - [place], [place] - [address] [phone], [phone] [address], [facility] [address]      Phone: [phone]   Naloxone HCl 4 MG/0.1ML Liqd  rivaroxaban 10 MG Tabs       Please  your prescriptions at the location directed by your doctor or nurse    Bring a paper prescription for each of these medications  cefTRIAXone in dextrose 5% IVPB premix  HYDROcodone-acetaminophen 5-325 MG Tabs  vancomycin in sodium chloride 0.9% 1.25 g/250mL Soln         CONSULTANTS  Consultants         Provider   Role Specialty     Jarod Taylor,  MD      Consulting Physician INFECTIOUS DISEASES     Susu Esposito MD      Consulting Physician HOSPITALIST            FOLLOW UP:  Rosalino Zhang MD  [address]  [address]  [facility] [address]  [phone]    Schedule an appointment as soon as possible for a visit      Juancho Freire MD  [address]  [address]  [place] [address]  [phone]    Schedule an appointment as soon as possible for a visit in 2 week(s)      Houston Rivera MD  [address]  [address]  [facility] [address]  [phone]    Schedule an appointment as soon as possible for a visit in 2 week(s)  Surgery follow up    The above plan and follow-up instructions were reviewed with the patient and they verbalized understanding and agreement.  They understand to return to the emergency room for any concerning signs or symptoms.  Greater than 30 minutes spent on discharge.  -----------------------    Hospital Discharge Diagnoses:  Prosthetic joint infection    Lace+ Score: 38  59-90 High Risk  29-58 Medium Risk  0-28   Low Risk.    TCM Follow-Up Recommendation:  LACE 29-58: Moderate Risk of readmission after discharge from the hospital.  Supplementary Documentation:       Electronically signed by Chip Foreman MD on 3/21/2025  1:39 PM

## (undated) NOTE — LETTER
Hospital Discharge Documentation  Please phone to schedule a hospital follow up appointment. From: [address] Hospitalist's Office  Phone: [phone]    Patient discharged time/date: 2023  4:30 PM  Patient discharge disposition:  Home or Self Care       Discharge Summary - D/C Summary        Discharge Summary signed by Caitlin Emerson MD at 2023  3:56 PM  Version 1 of 1      Author: Caitlin Emerson MD Service: Internal Medicine Author Type: Physician    Filed: 2023  3:56 PM Date of Service: 2023  3:53 PM Status: Signed    : Caitlin Emerson MD (Physician)         [facility]    Discharge Summary    Antoinette Castanon Patient Status:  Inpatient    1950 MRN [id number]   Location [facility] 4W/SW/SE Attending Caitlin Emerson MD   Hosp Day # 2 PCP Lisa Feliciano MD     Date of Admission: 2023   Date of Discharge: 2023    Admitting Diagnosis: Small bowel obstruction ([address] [G05.446]    [facility]: Acute Small Bowel Obstruction    Lace+ Score: 51  59-90 High Risk  29-58 Medium Risk  0-28   Low Risk. TCM Follow-Up Recommendation:  LACE 29-58: Moderate Risk of readmission after discharge from the hospital.        Discharge Diagnosis: . Principal Problem:    Small bowel obstruction [facility])      Hospital Course:   Reason for Admission:   Per Dr. Deborah Arevalo    Patient is a 68-year-old  female who presented to the emergency department for evaluation of nausea, vomiting, abdominal distension since this morning. CBC and chemistry unremarkable. GFR is 58 which is slightly below her baseline. Urinalysis, troponin, lipase and liver function tests were unremarkable. CT scan of the abdomen showed small-bowel obstruction with transition point in the right lower quadrant of the distal ileum. Marked dilation of the ileum up to 5.6 cm. Discharge Physical Exam:   Physical Exam:    General: No acute distress. Respiratory: Clear to auscultation bilaterally. No wheezes. No rhonchi. Cardiovascular: S1, S2. Regular rate and rhythm. No murmurs, rubs or gallops. Abdomen: Soft, nontender, nondistended. Positive bowel sounds. No rebound or guarding. Neurologic: No focal neurological deficits. Musculoskeletal: Moves all extremities. Hospital Course:    Small bowel obstruction (HCC)  - tolerated low fiber soft diet  -stable for discharge today      DM2  - Patient states she is allergic to insulin  - restart her home januvia and glipizide      History of CAD with stent  - brilinta and aspirin   - Cards recs appreciated      HTN  -continue home meds            Quality:  DVT Prophylaxis: SCDs  CODE status:Full      Complications: none    Consultants         Provider   Role Specialty     Warden Luis Manuel MD  Consulting Physician Bijal Sanders MD  Consulting Physician Interventional, Cardiology                Discharge Plan:   Discharge Condition: Stable    Current Discharge Medication List    Home Meds - Unchanged    acetaminophen (ACETAMINOPHEN EXTRA STRENGTH) 500 MG Oral Tab  Take 1 tablet (500 mg total) by mouth every 6 (six) hours as needed for Pain. !! diphenhydrAMINE (BANOPHEN) 25 MG Oral Cap  Take 1 capsule (25 mg total) by mouth nightly as needed for Sleep. aspirin 81 MG Oral Tab EC  Take 1 tablet (81 mg total) by mouth 2 (two) times daily with meals. Take with breakfast and dinner for 1 month. Then resume aspirin 81 mg once daily. calcium carbonate-vitamin D 250-3. 125 MG-MCG Oral Tab  Take 1 tablet by mouth 2 (two) times daily with meals. multivitamin with minerals Oral Tab  Take 1 tablet by mouth daily. ticagrelor (BRILINTA) 90 MG Oral Tab  Take 1 tablet (90 mg total) by mouth every 12 (twelve) hours. atorvastatin 20 MG Oral Tab  Take 1 tablet (20 mg total) by mouth nightly. JANUVIA 100 MG Oral Tab  Take 1 tablet (100 mg total) by mouth daily.     alprazolam 0.25 MG Oral Tab  Take 1 tablet (0.25 mg total) by mouth 4 (four) times daily as needed. glipiZIDE 5 MG Oral Tab  Take 2 tablets (10 mg total) by mouth 2 (two) times daily before meals. losartan 25 MG Oral Tab  Take 1 tablet (25 mg total) by mouth daily. LYRICA 300 MG Oral Cap  Take 1 capsule (300 mg total) by mouth nightly. Acetaminophen 500 MG Oral Cap  Take 1-2 capsules (500-1,000 mg total) by mouth every 12 (twelve) hours as needed for Pain. Do not take more than 4000 mg of acetaminophen from all sources in 24 hours. pantoprazole 40 MG Oral Tab EC      !! diphenhydrAMINE 25 MG Oral Cap  Take 1 capsule (25 mg total) by mouth every 6 (six) hours as needed for Itching.    hydrOXYzine 25 MG Oral Tab  Take 1-2 tablets (25-50 mg total) by mouth every 4 (four) hours as needed for Itching. docusate sodium 100 MG Oral Cap  Take 1 capsule by mouth daily. montelukast 10 MG Oral Tab  Take 1 tablet (10 mg total) by mouth nightly. !! - Potential duplicate medications found. Please discuss with provider. Discharge Diet: ADA diet     Discharge Activity: As tolerated       Discharge Medications        CONTINUE taking these medications        Instructions Prescription details   Acetaminophen 500 MG Caps      Take 1-2 capsules (500-1,000 mg total) by mouth every 12 (twelve) hours as needed for Pain. Do not take more than 4000 mg of acetaminophen from all sources in 24 hours. Quantity: 40 capsule  Refills: 0     acetaminophen 500 MG Tabs  Commonly known as: Acetaminophen Extra Strength      Take 1 tablet (500 mg total) by mouth every 6 (six) hours as needed for Pain. Quantity: 20 tablet  Refills: 0     ALPRAZolam 0.25 MG Tabs  Commonly known as: Xanax      Take 1 tablet (0.25 mg total) by mouth 4 (four) times daily as needed. Refills: 5     aspirin 81 MG Tbec      Take 1 tablet (81 mg total) by mouth 2 (two) times daily with meals. Take with breakfast and dinner for 1 month.   Then resume aspirin 81 mg once daily. Quantity: 60 tablet  Refills: 0     atorvastatin 20 MG Tabs  Commonly known as: Lipitor      Take 1 tablet (20 mg total) by mouth nightly. Quantity: 30 tablet  Refills: 5     calcium carbonate-vitamin D 250-3. 125 MG-MCG Tabs  Commonly known as: Oyster Shell-D      Take 1 tablet by mouth 2 (two) times daily with meals. Quantity: 60 tablet  Refills: 0     diphenhydrAMINE 25 MG Caps  Commonly known as: Benadryl      Take 1 capsule (25 mg total) by mouth every 6 (six) hours as needed for Itching. Quantity: 20 capsule  Refills: 0     diphenhydrAMINE 25 MG Caps  Commonly known as: Banophen      Take 1 capsule (25 mg total) by mouth nightly as needed for Sleep. Quantity: 10 capsule  Refills: 0     docusate sodium 100 MG Caps  Commonly known as: COLACE      Take 1 capsule by mouth daily. Refills: 0     glipiZIDE 5 MG Tabs  Commonly known as: Glucotrol      Take 2 tablets (10 mg total) by mouth 2 (two) times daily before meals. Refills: 2     hydrOXYzine 25 MG Tabs  Commonly known as: Atarax      Take 1-2 tablets (25-50 mg total) by mouth every 4 (four) hours as needed for Itching. Quantity: 120 tablet  Refills: 0     Januvia 100 MG Tabs  Generic drug: SITagliptin Phosphate      Take 1 tablet (100 mg total) by mouth daily. Refills: 0     losartan 25 MG Tabs  Commonly known as: Cozaar      Take 1 tablet (25 mg total) by mouth daily. Refills: 0     Lyrica 300 MG Caps  Generic drug: pregabalin      Take 1 capsule (300 mg total) by mouth nightly. Refills: 3     montelukast 10 MG Tabs  Commonly known as: Singulair      Take 1 tablet (10 mg total) by mouth nightly. Quantity: 30 tablet  Refills: 3     multivitamin with minerals Tabs      Take 1 tablet by mouth daily. Quantity: 30 tablet  Refills: 0     pantoprazole 40 MG Tbec  Commonly known as: Protonix       Refills: 0     ticagrelor 90 MG Tabs  Commonly known as: Brilinta      Take 1 tablet (90 mg total) by mouth every 12 (twelve) hours. Quantity: 60 tablet  Refills: 3              Follow up: Follow-up Information       Hanny Gaitan MD Follow up in 1 week(s).     Specialty: Family Medicine  Contact information:  [address] [facility] [place] [address]  [phone]                             Follow up Labs and imaging:         Time spent:  > 30 minutes    Dariela Jovel MD  7/9/2023      Electronically signed by Deejay Vicente MD on 7/9/2023  3:56 PM

## (undated) NOTE — LETTER
3/11/2025          To Whom It May Concern:    Nina Baker is currently under my medical care and may not return to {em school/work:586812522} at this time.    Please excuse Nina for {NUMBERS 0-10:3282} {days weeks:3323::\"days\"}.  {HE/SHE :6250} may return to {em school/work:722383281} on ***.  Activity is restricted as follows: {EM SCHOOL/WORK RESTRICTIONS:265141576}.    If you require additional information please contact our office.        Sincerely,    Houston Rivera MD          Document generated by:  Kelli ALVAREZ

## (undated) NOTE — LETTER
AUTHORIZATION FOR SURGICAL OPERATION OR OTHER PROCEDURE    1.  I hereby authorize Dr. Sara Alvarez, and [facility] staff assigned to my case to perform the following operation and/or procedure at the [facility]:    ____________________Right knee Time:  ________ A. M.  P.M.        Patient Name:  ______________________________________________________  (please print)      Patient signature:  ___________________________________________________             Relationship to Patient:           []  Parent

## (undated) NOTE — IP AVS SNAPSHOT
[facility]            (For Outpatient Use Only) Initial Admit Date: 8/16/2021   Inpt/Obs Admit Date: Inpt: 8/17/21 / Obs: N/A   Discharge Date:    Cintia Ramos:  [de-identified]   MRN: [de-identified]   CSN: [id number]   CEID: [id number]        BEN Payor:  Plan:    Group Number:  Insurance Type:    Subscriber Name:  Subscriber :    Subscriber ID:  Pt Rel to Subscriber:    Hospital Account Financial Class: Medicare    2021

## (undated) NOTE — LETTER
AUTHORIZATION FOR SURGICAL OPERATION OR OTHER PROCEDURE    1.  I hereby authorize Dr. Julieta Garzno, and [facility] staff assigned to my case to perform the following operation and/or procedure at the [facility]:    _______________________________ Time:  ________ A. M.  P.M.        Patient Name:  ______________________________________________________  (please print)      Patient signature:  ___________________________________________________             Relationship to Patient:           []  Prashanth Clas